# Patient Record
Sex: FEMALE | Race: WHITE | NOT HISPANIC OR LATINO | Employment: OTHER | URBAN - METROPOLITAN AREA
[De-identification: names, ages, dates, MRNs, and addresses within clinical notes are randomized per-mention and may not be internally consistent; named-entity substitution may affect disease eponyms.]

---

## 2017-02-06 RX ORDER — PAROXETINE HYDROCHLORIDE 20 MG/1
TABLET, FILM COATED ORAL
Qty: 90 TAB | Refills: 3 | Status: SHIPPED | OUTPATIENT
Start: 2017-02-06 | End: 2018-06-13 | Stop reason: SDUPTHER

## 2017-03-10 ENCOUNTER — OFFICE VISIT (OUTPATIENT)
Dept: URGENT CARE | Facility: CLINIC | Age: 82
End: 2017-03-10
Payer: MEDICARE

## 2017-03-10 VITALS
HEART RATE: 112 BPM | BODY MASS INDEX: 23.95 KG/M2 | HEIGHT: 60 IN | DIASTOLIC BLOOD PRESSURE: 72 MMHG | SYSTOLIC BLOOD PRESSURE: 140 MMHG | OXYGEN SATURATION: 94 % | WEIGHT: 122 LBS | TEMPERATURE: 97.9 F | RESPIRATION RATE: 20 BRPM

## 2017-03-10 DIAGNOSIS — J44.1 COPD WITH ACUTE EXACERBATION (HCC): ICD-10-CM

## 2017-03-10 DIAGNOSIS — J98.01 ACUTE BRONCHOSPASM: ICD-10-CM

## 2017-03-10 PROCEDURE — 99214 OFFICE O/P EST MOD 30 MIN: CPT | Performed by: EMERGENCY MEDICINE

## 2017-03-10 PROCEDURE — G8420 CALC BMI NORM PARAMETERS: HCPCS | Performed by: EMERGENCY MEDICINE

## 2017-03-10 PROCEDURE — G8432 DEP SCR NOT DOC, RNG: HCPCS | Performed by: EMERGENCY MEDICINE

## 2017-03-10 PROCEDURE — G8484 FLU IMMUNIZE NO ADMIN: HCPCS | Performed by: EMERGENCY MEDICINE

## 2017-03-10 PROCEDURE — 4040F PNEUMOC VAC/ADMIN/RCVD: CPT | Performed by: EMERGENCY MEDICINE

## 2017-03-10 PROCEDURE — 1036F TOBACCO NON-USER: CPT | Performed by: EMERGENCY MEDICINE

## 2017-03-10 PROCEDURE — 1101F PT FALLS ASSESS-DOCD LE1/YR: CPT | Performed by: EMERGENCY MEDICINE

## 2017-03-10 RX ORDER — DOXYCYCLINE HYCLATE 100 MG
100 TABLET ORAL 2 TIMES DAILY
Qty: 14 TAB | Refills: 0 | Status: SHIPPED | OUTPATIENT
Start: 2017-03-10 | End: 2017-06-08

## 2017-03-10 RX ORDER — PREDNISONE 20 MG/1
40 TABLET ORAL DAILY
Qty: 10 TAB | Refills: 0 | Status: SHIPPED | OUTPATIENT
Start: 2017-03-10 | End: 2018-02-13

## 2017-03-10 ASSESSMENT — ENCOUNTER SYMPTOMS
FEVER: 0
WHEEZING: 1
CHILLS: 0
DIARRHEA: 0
SORE THROAT: 1
COUGH: 1
HEADACHES: 0
NAUSEA: 0
ABDOMINAL PAIN: 0
VOMITING: 0
SPUTUM PRODUCTION: 1
PALPITATIONS: 0
HEARTBURN: 0
MYALGIAS: 0
RHINORRHEA: 1
HEMOPTYSIS: 0
SHORTNESS OF BREATH: 0

## 2017-03-10 ASSESSMENT — COPD QUESTIONNAIRES: COPD: 1

## 2017-03-11 NOTE — PATIENT INSTRUCTIONS
Use your Xopenex as needed every 4-6 hours, 1-2 puffs with spacer. Continue your Symbicort.Use an oral probiotic daily, such as Culturelle, Align, or yogurt to reduce gastrointestinal symptoms.  Go to the nearest hospital Emergency Department, or call 911, if any worsening condition.    Bronchospasm, Adult  A bronchospasm is when the tubes that carry air in and out of your lungs (airways) spasm or tighten. During a bronchospasm it is hard to breathe. This is because the airways get smaller. A bronchospasm can be triggered by:  · Allergies. These may be to animals, pollen, food, or mold.  · Infection. This is a common cause of bronchospasm.  · Exercise.  · Irritants. These include pollution, cigarette smoke, strong odors, aerosol sprays, and paint fumes.  · Weather changes.  · Stress.  · Being emotional.  HOME CARE   · Always have a plan for getting help. Know when to call your doctor and local emergency services (911 in the [place] Know where you can get emergency care.  · Only take medicines as told by your doctor.  · If you were prescribed an inhaler or nebulizer machine, ask your doctor how to use it correctly. Always use a spacer with your inhaler if you were given one.  · Stay calm during an attack. Try to relax and breathe more slowly.  · Control your home environment:  ¨ Change your heating and air conditioning filter at least once a month.  ¨ Limit your use of fireplaces and wood stoves.  ¨ Do not  smoke. Do not  allow smoking in your home.  ¨ Avoid perfumes and fragrances.  ¨ Get rid of pests (such as roaches and mice) and their droppings.  ¨ Throw away plants if you see mold on them.  ¨ Keep your house clean and dust free.  ¨ Replace carpet with wood, tile, or vinyl kameron. Carpet can trap dander and dust.  ¨ Use allergy-proof pillows, mattress covers, and box spring covers.  ¨ Wash bed sheets and blankets every week in hot water. Dry them in a dryer.  ¨ Use blankets that are made of polyester or  cotton.  ¨ Wash hands frequently.  GET HELP IF:  · You have muscle aches.  · You have chest pain.  · The thick spit you spit or cough up (sputum) changes from clear or white to yellow, green, gray, or bloody.  · The thick spit you spit or cough up gets thicker.  · There are problems that may be related to the medicine you are given such as:  ¨ A rash.  ¨ Itching.  ¨ Swelling.  ¨ Trouble breathing.  GET HELP RIGHT AWAY IF:  · You feel you cannot breathe or catch your breath.  · You cannot stop coughing.  · Your treatment is not helping you breathe better.  · You have very bad chest pain.  MAKE SURE YOU:   · Understand these instructions.  · Will watch your condition.  · Will get help right away if you are not doing well or get worse.     This information is not intended to replace advice given to you by your health care provider. Make sure you discuss any questions you have with your health care provider.     Document Released: 10/15/2010 Document Revised: 01/08/2016 Document Reviewed: 06/10/2014  [employer] Interactive Patient Education ©2016 [employer]  Chronic Obstructive Pulmonary Disease  Chronic obstructive pulmonary disease (COPD) is a common lung problem. In COPD, the flow of air from the lungs is limited. The way your lungs work will probably never return to normal, but there are things you can do to improve your lungs and make yourself feel better. Your doctor may treat your condition with:  · Medicines.  · Oxygen.  · Lung surgery.  · Changes to your diet.  · Rehabilitation. This may involve a team of specialists.  HOME CARE  · Take all medicines as told by your doctor.  · Avoid medicines or cough syrups that dry up your airway (such as antihistamines) and do not allow you to get rid of thick spit. You do not need to avoid them if told differently by your doctor.  · If you smoke, stop. Smoking makes the problem worse.  · Avoid being around things that make your breathing worse (like smoke, chemicals, and  fumes).  · Use oxygen therapy and therapy to help improve your lungs (pulmonary rehabilitation) if told by your doctor. If you need home oxygen therapy, ask your doctor if you should buy a tool to measure your oxygen level (oximeter).  · Avoid people who have a sickness you can catch (contagious).  · Avoid going outside when it is very hot, cold, or humid.  · Eat healthy foods. Eat smaller meals more often. Rest before meals.  · Stay active, but remember to also rest.  · Make sure to get all the shots (vaccines) your doctor recommends. Ask your doctor if you need a pneumonia shot.  · Learn and use tips on how to relax.  · Learn and use tips on how to control your breathing as told by your doctor. Try:  ¨ Breathing in (inhaling) through your nose for 1 second. Then, pucker your lips and breath out (exhale) through your lips for 2 seconds.  ¨ Putting one hand on your belly (abdomen). Breathe in slowly through your nose for 1 second. Your hand on your belly should move out. Pucker your lips and breathe out slowly through your lips. Your hand on your belly should move in as you breathe out.  · Learn and use controlled coughing to clear thick spit from your lungs. The steps are:  · Lean your head a little forward.  · Breathe in deeply.  · Try to hold your breath for 3 seconds.  · Keep your mouth slightly open while coughing 2 times.  · Spit any thick spit out into a tissue.  · Rest and do the steps again 1 or 2 times as needed.  GET HELP IF:  · You cough up more thick spit than usual.  · There is a change in the color or thickness of the spit.  · It is harder to breathe than usual.  · Your breathing is faster than usual.  GET HELP RIGHT AWAY IF:  · You have shortness of breath while resting.  · You have shortness of breath that stops you from:  · Being able to talk.  · Doing normal activities.  · You chest hurts for longer than 5 minutes.  · Your skin color is more blue than usual.  · Your pulse oximeter shows that you  have low oxygen for longer than 5 minutes.  MAKE SURE YOU:  · Understand these instructions.  · Will watch your condition.  · Will get help right away if you are not doing well or get worse.     This information is not intended to replace advice given to you by your health care provider. Make sure you discuss any questions you have with your health care provider.     Document Released: 06/05/2009 Document Revised: 01/08/2016 Document Reviewed: 08/14/2014  [employer] Interactive Patient Education ©2016 [employer]

## 2017-03-11 NOTE — PROGRESS NOTES
Subjective:      Tania Aguilar is a 82 y.o. female who presents with Cough            Cough  This is a new problem. Episode onset: 1 week. The problem has been gradually worsening. The problem occurs every few minutes. The cough is productive of purulent sputum. Associated symptoms include nasal congestion, postnasal drip, rhinorrhea, a sore throat and wheezing. Pertinent negatives include no chest pain, chills, ear congestion, ear pain, fever, headaches, heartburn, hemoptysis, myalgias, rash or shortness of breath. The symptoms are aggravated by lying down. She has tried a beta-agonist inhaler and steroid inhaler for the symptoms. The treatment provided mild relief. Her past medical history is significant for COPD.       Review of Systems   Constitutional: Negative for fever and chills.   HENT: Positive for congestion, postnasal drip, rhinorrhea and sore throat. Negative for ear pain, hearing loss and nosebleeds.    Respiratory: Positive for cough, sputum production and wheezing. Negative for hemoptysis and shortness of breath.    Cardiovascular: Negative for chest pain, palpitations and leg swelling.   Gastrointestinal: Negative for heartburn, nausea, vomiting, abdominal pain and diarrhea.   Musculoskeletal: Negative for myalgias.   Skin: Negative for rash.   Neurological: Negative for headaches.     PMH:  has a past medical history of [place] (hard of hearing) ( ); Hemorrhoids; Stress incontinence; Shingles; CHI (closed head injury); Allergic rhinitis; Meningitis; ASTHMA; GERD (gastroesophageal reflux disease); Vulvar cancer (CMS-HCC); Osteoporosis; COPD (chronic obstructive pulmonary disease) (CMS-HCC); Hypothyroidism; B12 deficiency; Hypertension; Sinusitis due to Moraxella catarrhalis; HSV-1 infection; Arthritis; Dental disorder; [place] MEDICAL HOME; Atrial fibrillation (CMS-HCC) (January 2015); Depression; Thyroid cancer (CMS-HCC); and Degenerative joint disease. She also has no past medical  history of Breast cancer (CMS-HCC).  MEDS:   Current outpatient prescriptions:   •  predniSONE (DELTASONE) 20 MG Tab, Take 2 Tabs by mouth every day., Disp: 10 Tab, Rfl: 0  •  doxycycline (VIBRAMYCIN) 100 MG Tab, Take 1 Tab by mouth 2 times a day., Disp: 14 Tab, Rfl: 0  •  paroxetine (PAXIL) 20 MG Tab, TAKE ONE TABLET BY MOUTH ONCE DAILY WITH FOOD, Disp: 90 Tab, Rfl: 3  •  omeprazole (PRILOSEC) 20 MG delayed-release capsule, TAKE ONE CAPSULE BY MOUTH ONCE DAILY, Disp: 90 Cap, Rfl: 3  •  liothyronine (CYTOMEL) 25 MCG Tab, Take 2 Tabs by mouth every bedtime., Disp: , Rfl:   •  cetirizine (ZYRTEC) 10 MG Tab, Take 1 Tab by mouth every day., Disp: , Rfl:   •  levothyroxine (SYNTHROID) 200 MCG Tab, Take 1 Tab by mouth Every morning on an empty stomach., Disp: , Rfl:   •  hydrocodone-acetaminophen (LORCET-HD) 5-500 MG per capsule, Take 1-2 Caps by mouth every four hours as needed., Disp: , Rfl:   •  Cyanocobalamin (B-12 PO), Take  by mouth., Disp: , Rfl:   •  tiotropium (SPIRIVA) 18 MCG Cap, Inhale 18 mcg by mouth every day., Disp: , Rfl:   •  acyclovir (ZOVIRAX) 5 % Ointment, Apply 1 Applicator to affected area(s) every 3 hours., Disp: 15 g, Rfl: 2  •  Fexofenadine HCl (MUCINEX ALLERGY PO), Take  by mouth., Disp: , Rfl:   •  budesonide-formoterol (SYMBICORT) 160-4.5 MCG/ACT AERO, Inhale 2 Puffs by mouth 2 Times a Day., Disp: , Rfl:   ALLERGIES:   Allergies   Allergen Reactions   • Ace Inhibitors      Cough     • Albuterol      Whole body spasmed  Patient tolerates Formoterol (Symbicort) - takes at home     SURGHX:   Past Surgical History   Procedure Laterality Date   • Other surgical procedure       ORx3 cancer removal (vulvar Ca.)   • Tonsillectomy     • Thyroidectomy        Due to thyroid cancern   • Other        Removal Rt sinus osteoma   • Hysterectomy robotic  12/27/2013     Performed by Zoltan Salazar M.D. at SURGERY [facility]   • Cystoscopy stent placement  12/27/2013     Performed by Zoltan Salazar M.D. at  SURGERY [facility]   • Gastroscopy with biopsy N/A 5/7/2015     Procedure: GASTROSCOPY WITH BIOPSY;  Surgeon: Travis Jones M.D.;  Location: [facility];  Service:    • Colonoscopy with biopsy N/A 5/7/2015     Procedure: COLONOSCOPY WITH BIOPSY;  Surgeon: Travis Jones M.D.;  Location: [facility];  Service:      SOCHX:  reports that she has been passively smoking.  She has never used smokeless tobacco. She reports that she drinks about 0.6 oz of alcohol per week. She reports that she does not use illicit drugs.  FH: family history includes Cancer in her brother, father, sister, and sister; Heart Disease in her mother and sister; Other in her sister.       Objective:     /72 mmHg  Pulse 112  Temp(Src) 36.6 °C (97.9 °F)  Resp 20  Ht 1.524 m (5')  Wt 55.339 kg (122 lb)  BMI 23.83 kg/m2  SpO2 94%     Physical Exam   Constitutional: She appears well-developed and well-nourished. She is cooperative.  Non-toxic appearance. No distress.   HENT:   Right Ear: Tympanic membrane and ear canal normal.   Left Ear: Tympanic membrane and ear canal normal.   Nose: Mucosal edema and rhinorrhea present. No nasal deformity. Left sinus exhibits frontal sinus tenderness.   Mouth/Throat: Posterior oropharyngeal erythema present. No oropharyngeal exudate or posterior oropharyngeal edema.   Eyes: Conjunctivae are normal.   Neck: Trachea normal and phonation normal. Neck supple. No JVD present.   Cardiovascular: Normal rate, regular rhythm and normal heart sounds.    No murmur heard.  Pulmonary/Chest: Effort normal. No accessory muscle usage. No tachypnea. No respiratory distress. She has decreased breath sounds in the right lower field and the left lower field. She has wheezes in the right upper field and the left upper field. She has no rhonchi. She has no rales.   Abdominal: She exhibits no distension.   Musculoskeletal:   No significant pedal edema.   Lymphadenopathy:     She has  no cervical adenopathy.   Neurological: She is alert.   Skin: Skin is warm and dry.   Psychiatric: She has a normal mood and affect.               Assessment/Plan:     1. COPD with acute exacerbation (CMS-HCC)  - predniSONE (DELTASONE) 20 MG Tab; Take 2 Tabs by mouth every day.  Dispense: 10 Tab; Refill: 0  - doxycycline (VIBRAMYCIN) 100 MG Tab; Take 1 Tab by mouth 2 times a day.  Dispense: 14 Tab; Refill: 0    2. Acute bronchospasm  Advised continue using Symbicort as directed.  Advised to use Xopenex with spacer, especially for the first few days.

## 2017-04-10 RX ORDER — ACYCLOVIR 50 MG/G
OINTMENT TOPICAL
Qty: 15 G | Refills: 5 | Status: SHIPPED | OUTPATIENT
Start: 2017-04-10 | End: 2018-02-13

## 2017-04-10 NOTE — TELEPHONE ENCOUNTER
Was the patient seen in the last year in this department? Yes     Does patient have an active prescription for medications requested? No     Received Request Via: Pharmacy     Last seen: 9/14/2016 Slots

## 2017-05-23 ENCOUNTER — HOSPITAL ENCOUNTER (OUTPATIENT)
Facility: MEDICAL CENTER | Age: 82
End: 2017-05-23
Attending: SPECIALIST
Payer: MEDICARE

## 2017-05-23 PROCEDURE — 88175 CYTOPATH C/V AUTO FLUID REDO: CPT

## 2017-05-24 LAB — CYTOLOGY REG CYTOL: NORMAL

## 2017-06-07 ENCOUNTER — APPOINTMENT (OUTPATIENT)
Dept: PULMONOLOGY | Facility: HOSPICE | Age: 82
End: 2017-06-07
Payer: MEDICARE

## 2017-06-08 ENCOUNTER — APPOINTMENT (OUTPATIENT)
Dept: RADIOLOGY | Facility: IMAGING CENTER | Age: 82
End: 2017-06-08
Attending: NURSE PRACTITIONER
Payer: MEDICARE

## 2017-06-08 ENCOUNTER — OFFICE VISIT (OUTPATIENT)
Dept: URGENT CARE | Facility: CLINIC | Age: 82
End: 2017-06-08
Payer: MEDICARE

## 2017-06-08 VITALS
SYSTOLIC BLOOD PRESSURE: 140 MMHG | HEART RATE: 91 BPM | TEMPERATURE: 98.8 F | WEIGHT: 129 LBS | BODY MASS INDEX: 25.32 KG/M2 | RESPIRATION RATE: 16 BRPM | DIASTOLIC BLOOD PRESSURE: 80 MMHG | OXYGEN SATURATION: 98 % | HEIGHT: 60 IN
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
## 2017-06-08 DIAGNOSIS — S69.90XA INJURY OF LITTLE FINGER: ICD-10-CM

## 2017-06-08 DIAGNOSIS — S80.211A ABRASION OF BOTH KNEES: ICD-10-CM

## 2017-06-08 DIAGNOSIS — S62.619A: ICD-10-CM

## 2017-06-08 DIAGNOSIS — S80.212A ABRASION OF BOTH KNEES: ICD-10-CM

## 2017-06-08 DIAGNOSIS — S00.81XA FACIAL ABRASION, INITIAL ENCOUNTER: ICD-10-CM

## 2017-06-08 PROCEDURE — 73140 X-RAY EXAM OF FINGER(S): CPT | Mod: TC,LT | Performed by: NURSE PRACTITIONER

## 2017-06-08 PROCEDURE — 99214 OFFICE O/P EST MOD 30 MIN: CPT | Performed by: NURSE PRACTITIONER

## 2017-06-08 ASSESSMENT — ENCOUNTER SYMPTOMS
HEADACHES: 0
SENSORY CHANGE: 0
GASTROINTESTINAL NEGATIVE: 1
NEUROLOGICAL NEGATIVE: 1
EYES NEGATIVE: 1
NECK PAIN: 0
NAUSEA: 0
FOCAL WEAKNESS: 0
CONSTITUTIONAL NEGATIVE: 1
DIZZINESS: 0
FALLS: 1
BLURRED VISION: 0
SPEECH CHANGE: 0
VOMITING: 0
PSYCHIATRIC NEGATIVE: 1
LOSS OF CONSCIOUSNESS: 0
ABDOMINAL PAIN: 0
MYALGIAS: 0
BACK PAIN: 0
RESPIRATORY NEGATIVE: 1
TINGLING: 0
SEIZURES: 0
DOUBLE VISION: 0
EYE PAIN: 0
TREMORS: 0
CARDIOVASCULAR NEGATIVE: 1

## 2017-06-08 NOTE — MR AVS SNAPSHOT
Tania Aguilar   2017 5:00 PM   Office Visit   MRN: [id number]    Department:  [facility]   Dept Phone:  [phone]    Description:  Female : 1935   Provider:  MAREN Cannon           Reason for Visit     Finger Pain L hand pinky injury,swollen, fell down and hit face       Allergies as of 2017     Allergen Noted Reactions    Ace Inhibitors 2009       Cough      Albuterol 2013       Whole body spasmed  Patient tolerates Formoterol (Symbicort) - takes at home      You were diagnosed with     Fall, initial encounter   [745416]       Displaced fracture of proximal phalanx of finger of left hand   [5568964]       Facial abrasion, initial encounter   [001346]       Abrasion of both knees   [[id number]]         Vital Signs     Blood Pressure Pulse Temperature Respirations Height Weight    140/80 mmHg 91 37.1 °C (98.8 °F) 16 1.524 m (5') 58.514 kg (129 lb)    Body Mass Index Oxygen Saturation Smoking Status             25.19 kg/m2 98% Passive Smoke Exposure - Never Smoker         Basic Information     Date Of Birth Sex Race Ethnicity Preferred Language    1935 Female White Non- English      Your appointments     2017 11:00 AM   Established Patient Pul with MAREN Silva   [facility] (--)    [address] [place] [address] [address]  [facility] [id number] [phone]              Problem List              ICD-10-CM Priority Class Noted - Resolved    HSV-1 infection B00.9 Low  2009 - Present    Preventative health care Z00.00 Low  2009 - Present    Essential hypertension, benign I10 High  2009 - Present    Hypothyroidism E03.9 Low  2009 - Present    COPD (chronic obstructive pulmonary disease) (CMS-HCC) J44.9 Medium  Unknown - Present    B12 deficiency E53.8 Low  5/3/2011 - Present    Hard of hearing H91.90 Medium  5/3/2011 - Present    Stress incontinence N39.3 Low  Unknown - Present    MDD (major  depressive disorder), recurrent episode, moderate (CMS-HCC) F33.1 Low  Unknown - Present    DJD (degenerative joint disease) M19.90 Low  Unknown - Present    Asthma, mild intermittent, well-controlled J45.20 Medium  Unknown - Present    GERD (gastroesophageal reflux disease) K21.9 Medium  Unknown - Present    History of vulvar dysplasia Z87.412 Low  Unknown - Present    Heart murmur R01.1 Medium  Unknown - Present    Vertigo, benign positional H81.10 Low  6/4/2011 - Present    MEDICAL HOME  Low  2/2/2015 - Present    Atrial fibrillation (CMS-HCC) I48.91 High  5/1/2015 - Present    Anemia D64.9 Medium  5/22/2015 - Present    SEA (obstructive sleep apnea) G47.33   7/7/2016 - Present    Osteoporosis M81.0   9/14/2016 - Present    Seasonal allergies J30.2   9/14/2016 - Present    Prediabetes R73.03   9/14/2016 - Present      Health Maintenance        Date Due Completion Dates    IMM DTaP/Tdap/Td Vaccine (1 - Tdap) 2/9/1955 ---    IMM ZOSTER VACCINE 2/9/1996 ---    IMM PNEUMOCOCCAL 65+ (ADULT) LOW/MEDIUM RISK SERIES (2 of 2 - PCV13) 7/13/2016 7/13/2015, 3/22/2013    BONE DENSITY 9/16/2019 9/16/2014, 7/2/2010    COLONOSCOPY 5/28/2020 5/28/2015, 5/7/2015            Current Immunizations     Influenza TIV (IM) 10/1/2015, 10/4/2013    Influenza Vaccine Pediatric 10/7/2009    Pneumococcal polysaccharide vaccine (PPSV-23) 7/13/2015, 3/22/2013      Below and/or attached are the medications your provider expects you to take. Review all of your home medications and newly ordered medications with your provider and/or pharmacist. Follow medication instructions as directed by your provider and/or pharmacist. Please keep your medication list with you and share with your provider. Update the information when medications are discontinued, doses are changed, or new medications (including over-the-counter products) are added; and carry medication information at all times in the event of emergency situations     Allergies:  ACE INHIBITORS  - (reactions not documented)     ALBUTEROL - (reactions not documented)               Medications  Valid as of: June 08, 2017 -  6:19 PM    Generic Name Brand Name Tablet Size Instructions for use    Acyclovir (Ointment) ZOVIRAX 5 % APPLY TO AFFECTED AREA(S) EVERY 3 HOURS        Budesonide-Formoterol Fumarate (Aerosol) SYMBICORT 160-4.5 MCG/ACT Inhale 2 Puffs by mouth 2 Times a Day.        Cetirizine HCl (Tab) ZYRTEC 10 MG Take 1 Tab by mouth every day.        Cyanocobalamin   Take  by mouth.        Fexofenadine HCl   Take  by mouth.        Levothyroxine Sodium (Tab) SYNTHROID 200 MCG Take 1 Tab by mouth Every morning on an empty stomach.        Liothyronine Sodium (Tab) CYTOMEL 25 MCG Take 2 Tabs by mouth every bedtime.        Omeprazole (CAPSULE DELAYED RELEASE) PRILOSEC 20 MG TAKE ONE CAPSULE BY MOUTH ONCE DAILY        PARoxetine HCl (Tab) PAXIL 20 MG TAKE ONE TABLET BY MOUTH ONCE DAILY WITH FOOD        PredniSONE (Tab) DELTASONE 20 MG Take 2 Tabs by mouth every day.        Tiotropium Bromide Monohydrate (Cap) SPIRIVA 18 MCG Inhale 18 mcg by mouth every day.        .                 Medicines prescribed today were sent to:     [facility] [address] [place] [address], [place] - [address] [facility]    [address] [facility] [address]    Phone: [phone] Fax: [phone]    Open 24 Hours?: No      Medication refill instructions:       If your prescription bottle indicates you have medication refills left, it is not necessary to call your provider’s office. Please contact your pharmacy and they will refill your medication.    If your prescription bottle indicates you do not have any refills left, you may request refills at any time through one of the following ways: The online [employer] system (except Urgent Care), by calling your provider’s office, or by asking your pharmacy to contact your provider’s office with a refill request. Medication refills are processed only during regular business hours and may not be available  until the next business day. Your provider may request additional information or to have a follow-up visit with you prior to refilling your medication.   *Please Note: Medication refills are assigned a new Rx number when refilled electronically. Your pharmacy may indicate that no refills were authorized even though a new prescription for the same medication is available at the pharmacy. Please request the medicine by name with the pharmacy before contacting your provider for a refill.        Your To Do List     Future Labs/Procedures Complete By Expires    DX-FINGER(S) 2+ LEFT  As directed 6/8/2018      Referral     A referral request has been sent to our patient care coordination department. Please allow 3-5 business days for us to process this request and contact you either by phone or mail. If you do not hear from us by the 5th business day, please call us at [phone].        Other Notes About Your Plan     Patient is enrolled in [facility] with Dr. Grewal.           [employer] Access Code: Activation code not generated  Current Pharm[employer] Status: Active

## 2017-06-09 NOTE — PROGRESS NOTES
Subjective:      Tania Aguilar is a 82 y.o. female who presents with Finger Pain          HPI    The patient is here today with complaints of physical injuries resulted from a fall. She reports she accidentally tripped over a curb approx 1.5 hours ago, falling with her hands stretched out, hitting her hands and her face. She hit her left hand during the fall and is now mostly concerned about left pinky pain and swelling. She denies LOC, HA, neck or back pain. Denies headache, nausea, vomiting, changes in vision, unilateral weakness, difficulty with speech or ambulation. Admits to abrasions to her face and knees. Denies previous injuries or surgeries to her left pinky finger. Does admit to a history of arthritis. She is right-hand dominant. She has applied ice to both her face and hand for symptom relief. She does not take blood thinners.    Past Medical History   Diagnosis Date   • [place] (hard of hearing)       Bilaterally   • Hemorrhoids    • Stress incontinence    • Shingles    • CHI (closed head injury)    • Allergic rhinitis    • Meningitis    • ASTHMA    • GERD (gastroesophageal reflux disease)    • Vulvar cancer (CMS-HCC)    • Osteoporosis    • COPD (chronic obstructive pulmonary disease) (CMS-HCC)    • Hypothyroidism    • B12 deficiency    • Hypertension    • Sinusitis due to [place]ax[place] catarrhalis    • HSV-1 infection    • Arthritis      Hands   • Dental disorder      One broken tooth   • Cancer (CMS-[facility])      Vulvar; skin; thyroid   • MEDICAL HOME    • Atrial fibrillation (CMS-[facility]) January 2015     New onset. Echocardiogram with normal LV size, mild concentric LVH, LVEF 65-70%. Normal RA and LA. Mild MR, mild TR, RVSP 35-40mmHg. June 2015: GI bleed, Coumadin stopped/contraindicated.   • Depression    • Thyroid cancer (CMS-[facility])    • Degenerative joint disease      Past Surgical History   Procedure Laterality Date   • Other surgical procedure       ORx3 cancer removal (vulvar Ca.)   • Tonsillectomy     •  Thyroidectomy        Due to thyroid cancern   • Other        Removal Rt sinus osteoma   • Hysterectomy robotic  12/27/2013     Performed by Zoltan Salazar M.D. at SURGERY [facility]   • Cystoscopy stent placement  12/27/2013     Performed by Zoltan Salazar M.D. at SURGERY [facility]   • Gastroscopy with biopsy N/A 5/7/2015     Procedure: GASTROSCOPY WITH BIOPSY;  Surgeon: Travis Jones M.D.;  Location: SURGERY [facility];  Service:    • Colonoscopy with biopsy N/A 5/7/2015     Procedure: COLONOSCOPY WITH BIOPSY;  Surgeon: Travis Jones M.D.;  Location: SURGERY [facility];  Service:      Current Outpatient Prescriptions on File Prior to Visit   Medication Sig Dispense Refill   • acyclovir (ZOVIRAX) 5 % Ointment APPLY TO AFFECTED AREA(S) EVERY 3 HOURS 15 g 5   • predniSONE (DELTASONE) 20 MG Tab Take 2 Tabs by mouth every day. 10 Tab 0   • paroxetine (PAXIL) 20 MG Tab TAKE ONE TABLET BY MOUTH ONCE DAILY WITH FOOD 90 Tab 3   • omeprazole (PRILOSEC) 20 MG delayed-release capsule TAKE ONE CAPSULE BY MOUTH ONCE DAILY 90 Cap 3   • liothyronine (CYTOMEL) 25 MCG Tab Take 2 Tabs by mouth every bedtime.     • levothyroxine (SYNTHROID) 200 MCG Tab Take 1 Tab by mouth Every morning on an empty stomach.     • Cyanocobalamin (B-12 PO) Take  by mouth.     • tiotropium (SPIRIVA) 18 MCG Cap Inhale 18 mcg by mouth every day.     • Fexofenadine HCl (MUCINEX ALLERGY PO) Take  by mouth.     • budesonide-formoterol (SYMBICORT) 160-4.5 MCG/ACT AERO Inhale 2 Puffs by mouth 2 Times a Day.     • cetirizine (ZYRTEC) 10 MG Tab Take 1 Tab by mouth every day.       No current facility-administered medications on file prior to visit.     ALL: Ace inhibitors and Albuterol    Review of Systems   Constitutional: Negative.    HENT: Negative.  Negative for ear discharge, ear pain, hearing loss and nosebleeds.    Eyes: Negative.  Negative for blurred vision, double vision and pain.   Respiratory: Negative.    Cardiovascular:  Negative.    Gastrointestinal: Negative.  Negative for nausea, vomiting and abdominal pain.   Genitourinary: Negative.    Musculoskeletal: Positive for joint pain and falls. Negative for myalgias, back pain and neck pain.   Skin: Negative.    Neurological: Negative.  Negative for dizziness, tingling, tremors, sensory change, speech change, focal weakness, seizures, loss of consciousness and headaches.   Endo/Heme/Allergies: Negative.    Psychiatric/Behavioral: Negative.           Objective:     /80 mmHg  Pulse 91  Temp(Src) 37.1 °C (98.8 °F)  Resp 16  Ht 1.524 m (5')  Wt 58.514 kg (129 lb)  BMI 25.19 kg/m2  SpO2 98%      Physical Exam   Constitutional: She is oriented to person, place, and time. Vital signs are normal. She appears well-developed and well-nourished. She does not appear ill. No distress.   HENT:   Head: Normocephalic. Head is with abrasion. Head is without raccoon's eyes, without Macias's sign, without laceration, without right periorbital erythema and without left periorbital erythema.       Right Ear: Hearing, tympanic membrane, external ear and ear canal normal. No lacerations. No drainage. Tympanic membrane is not perforated.   Left Ear: Hearing, tympanic membrane, external ear and ear canal normal. No lacerations. No drainage. Tympanic membrane is not perforated.   Nose: Nose normal.   Mouth/Throat: Oropharynx is clear and moist. No oropharyngeal exudate.   Eyes: Conjunctivae, EOM and lids are normal. Pupils are equal, round, and reactive to light. Right eye exhibits no discharge. Left eye exhibits no discharge. No scleral icterus.   Neck: Normal range of motion and full passive range of motion without pain. Neck supple. No spinous process tenderness and no muscular tenderness present. No tracheal deviation and normal range of motion present.   Cardiovascular: Normal rate, regular rhythm, normal heart sounds and intact distal pulses.    Pulmonary/Chest: Effort normal and breath  "sounds normal. No stridor. No respiratory distress.   Musculoskeletal: She exhibits tenderness. She exhibits no edema.        Left elbow: Normal.        Left wrist: Normal.        Cervical back: Normal.        Thoracic back: Normal.        Lumbar back: Normal.        Left forearm: Normal.        Left hand: She exhibits decreased range of motion, tenderness, bony tenderness and swelling. She exhibits normal capillary refill, no deformity and no laceration. Normal sensation noted. Normal strength noted.        Hands:       Legs:  Lymphadenopathy:     She has no cervical adenopathy.   Neurological: She is alert and oriented to person, place, and time. She has normal strength. She is not disoriented. She displays no tremor. No cranial nerve deficit or sensory deficit. She exhibits normal muscle tone. She displays no seizure activity. Coordination and gait normal. GCS eye subscore is 4. GCS verbal subscore is 5. GCS motor subscore is 6.   Skin: Skin is warm. Abrasion and bruising noted. No ecchymosis, no laceration and no rash noted. She is not diaphoretic. No erythema. No pallor.   Psychiatric: She has a normal mood and affect. Her behavior is normal. Judgment and thought content normal.   Vitals reviewed.              Assessment/Plan:     1. Fall, initial encounter     2. Displaced fracture of proximal phalanx of finger of left hand  DX-FINGER(S) 2+ LEFT    REFERRAL TO ORTHOPEDICS   3. Facial abrasion, initial encounter     4. Abrasion of both knees           - DX-FINGER(S) 2+ LEFT reviewed by myself, radiology reading \"Minimally displaced, angulated fracture through the base of the fifth proximal phalanx.\"    Patient's neurovascular status is intact with minimal amount of pain, therefore patient placed in an ulnar gutter splint with follow-up with PATRICIA first thing tomorrow morning for reevaluation and treatment. N/V intact upon departure. Referral placed to ortho, will follow up with PATRICIA tomorrow.   Supportive care, " differential diagnoses, and indications for immediate follow-up discussed with patient.   Pathogenesis of diagnosis discussed including typical length and natural progression.   Instructed to return to clinic or nearest emergency department for any change in condition, further concerns, or worsening of symptoms.  Patient states understanding of the plan of care and discharge instructions.          MARLEN Cannon.

## 2017-07-19 ENCOUNTER — OFFICE VISIT (OUTPATIENT)
Dept: PULMONOLOGY | Facility: HOSPICE | Age: 82
End: 2017-07-19
Payer: MEDICARE

## 2017-07-19 VITALS
SYSTOLIC BLOOD PRESSURE: 130 MMHG | WEIGHT: 124 LBS | HEIGHT: 60 IN | HEART RATE: 94 BPM | RESPIRATION RATE: 16 BRPM | DIASTOLIC BLOOD PRESSURE: 72 MMHG | BODY MASS INDEX: 24.35 KG/M2 | OXYGEN SATURATION: 98 % | TEMPERATURE: 98 F

## 2017-07-19 DIAGNOSIS — G47.33 OSA (OBSTRUCTIVE SLEEP APNEA): ICD-10-CM

## 2017-07-19 DIAGNOSIS — J45.20 MILD INTERMITTENT ASTHMA WITHOUT COMPLICATION: ICD-10-CM

## 2017-07-19 PROCEDURE — 99214 OFFICE O/P EST MOD 30 MIN: CPT | Performed by: NURSE PRACTITIONER

## 2017-07-19 RX ORDER — SYRINGE AND NEEDLE,INSULIN,1ML 25GX1"
SYRINGE, EMPTY DISPOSABLE MISCELLANEOUS
Refills: 2 | COMMUNITY
Start: 2017-05-17 | End: 2019-01-27

## 2017-07-19 RX ORDER — LEVALBUTEROL TARTRATE 45 UG/1
1-2 AEROSOL, METERED ORAL EVERY 4 HOURS PRN
COMMUNITY
End: 2019-01-27

## 2017-07-19 RX ORDER — CYANOCOBALAMIN 1000 UG/ML
INJECTION, SOLUTION INTRAMUSCULAR; SUBCUTANEOUS
Refills: 3 | COMMUNITY
Start: 2017-05-22 | End: 2019-01-27

## 2017-07-19 NOTE — MR AVS SNAPSHOT
Tania Aguilar   2017 11:00 AM   Office Visit   MRN: [id number]    Department:  [facility]   Dept Phone:  [phone]    Description:  Female : 1935   Provider:  MAREN Silva           Reason for Visit     COPD Last seen 16      Allergies as of 2017     Allergen Noted Reactions    Ace Inhibitors 2009       Cough      Albuterol 2013       Whole body spasmed  Patient tolerates Formoterol (Symbicort) - takes at home      You were diagnosed with     Mild intermittent asthma without complication   [871483]       SEA (obstructive sleep apnea)   [200886]         Vital Signs     Blood Pressure Pulse Temperature Respirations Height Weight    130/72 mmHg 94 36.7 °C (98 °F) 16 1.524 m (5') 56.246 kg (124 lb)    Body Mass Index Oxygen Saturation Smoking Status             24.22 kg/m2 98% Passive Smoke Exposure - Never Smoker         Basic Information     Date Of Birth Sex Race Ethnicity Preferred Language    1935 Female White Non- English      Problem List              ICD-10-CM Priority Class Noted - Resolved    HSV-1 infection B00.9 Low  2009 - Present    Preventative health care Z00.00 Low  2009 - Present    Essential hypertension, benign I10 High  2009 - Present    Hypothyroidism E03.9 Low  2009 - Present    COPD (chronic obstructive pulmonary disease) (CMS-HCC) J44.9 Medium  Unknown - Present    B12 deficiency E53.8 Low  5/3/2011 - Present    Hard of hearing H91.90 Medium  5/3/2011 - Present    Stress incontinence N39.3 Low  Unknown - Present    MDD (major depressive disorder), recurrent episode, moderate (CMS-HCC) F33.1 Low  Unknown - Present    DJD (degenerative joint disease) M19.90 Low  Unknown - Present    Asthma, mild intermittent, well-controlled J45.20 Medium  Unknown - Present    GERD (gastroesophageal reflux disease) K21.9 Medium  Unknown - Present    History of vulvar dysplasia Z87.412 Low  Unknown - Present    Heart murmur R01.1 Medium  Unknown - Present    Vertigo, benign positional H81.10 Low  6/4/2011 - Present    MEDICAL HOME  Low  2/2/2015 - Present    Atrial fibrillation (CMS-[facility]) I48.91 High  5/1/2015 - Present    Anemia D64.9 Medium  5/22/2015 - Present    SAE (obstructive sleep apnea) G47.33   7/7/2016 - Present    Osteoporosis M81.0   9/14/2016 - Present    Seasonal allergies J30.2   9/14/2016 - Present    Prediabetes R73.03   9/14/2016 - Present    Mild intermittent asthma without complication J45.20   7/19/2017 - Present      Health Maintenance        Date Due Completion Dates    IMM DTaP/Tdap/Td Vaccine (1 - Tdap) 2/9/1955 ---    IMM ZOSTER VACCINE 2/9/1996 ---    IMM PNEUMOCOCCAL 65+ (ADULT) LOW/MEDIUM RISK SERIES (2 of 2 - PCV13) 7/13/2016 7/13/2015, 3/22/2013    IMM INFLUENZA (1) 9/1/2017 10/1/2015, 10/4/2013, 10/7/2009    BONE DENSITY 9/16/2019 9/16/2014, 7/2/2010    COLONOSCOPY 5/28/2020 5/28/2015, 5/7/2015            Current Immunizations     Influenza TIV (IM) 10/1/2015, 10/4/2013    Influenza Vaccine Pediatric 10/7/2009    Pneumococcal polysaccharide vaccine (PPSV-23) 7/13/2015, 3/22/2013      Below and/or attached are the medications your provider expects you to take. Review all of your home medications and newly ordered medications with your provider and/or pharmacist. Follow medication instructions as directed by your provider and/or pharmacist. Please keep your medication list with you and share with your provider. Update the information when medications are discontinued, doses are changed, or new medications (including over-the-counter products) are added; and carry medication information at all times in the event of emergency situations     Allergies:  ACE INHIBITORS - (reactions not documented)     ALBUTEROL - (reactions not documented)               Medications  Valid as of: July 19, 2017 - 12:21 PM    Generic Name Brand Name Tablet Size Instructions for use    Acyclovir (Ointment) ZOVIRAX 5 %  "APPLY TO AFFECTED AREA(S) EVERY 3 HOURS        Budesonide-Formoterol Fumarate (Aerosol) SYMBICORT 160-4.5 MCG/ACT Inhale 2 Puffs by mouth 2 Times a Day.        Cetirizine HCl (Tab) ZYRTEC 10 MG Take 1 Tab by mouth every day.        Cyanocobalamin   Take  by mouth.        Cyanocobalamin (Solution) VITAMIN B-12 1000 MCG/ML INJECT 1ML SUBCUTANEOUSLY ONCE WEEKLY        Fexofenadine HCl   Take  by mouth.        Insulin Syringe-Needle U-100 (Misc) B-D INSULIN SYRINGE 1CC/25GX1\" 25G X 1\" 1 ML USE AS DIRECTED        Levalbuterol Tartrate (Aerosol) XOPENEX HFA 45 MCG/ACT Inhale 1-2 Puffs by mouth every four hours as needed for Shortness of Breath.        Levothyroxine Sodium (Tab) SYNTHROID 200 MCG Take 1 Tab by mouth Every morning on an empty stomach.        Liothyronine Sodium (Tab) CYTOMEL 25 MCG Take 2 Tabs by mouth every bedtime.        Omeprazole (CAPSULE DELAYED RELEASE) PRILOSEC 20 MG TAKE ONE CAPSULE BY MOUTH ONCE DAILY        PARoxetine HCl (Tab) PAXIL 20 MG TAKE ONE TABLET BY MOUTH ONCE DAILY WITH FOOD        PredniSONE (Tab) DELTASONE 20 MG Take 2 Tabs by mouth every day.        Tiotropium Bromide Monohydrate (Cap) SPIRIVA 18 MCG Inhale 18 mcg by mouth every day.        .                 Medicines prescribed today were sent to:     [facility] [address] [place] [address], [place] - [address] [place] [address]    [address] [facility] [address]    Phone: [phone] Fax: [phone]    Open 24 Hours?: No      Medication refill instructions:       If your prescription bottle indicates you have medication refills left, it is not necessary to call your provider’s office. Please contact your pharmacy and they will refill your medication.    If your prescription bottle indicates you do not have any refills left, you may request refills at any time through one of the following ways: The online [employer] system (except Urgent Care), by calling your provider’s office, or by asking your pharmacy to contact your provider’s office with a " refill request. Medication refills are processed only during regular business hours and may not be available until the next business day. Your provider may request additional information or to have a follow-up visit with you prior to refilling your medication.   *Please Note: Medication refills are assigned a new Rx number when refilled electronically. Your pharmacy may indicate that no refills were authorized even though a new prescription for the same medication is available at the pharmacy. Please request the medicine by name with the pharmacy before contacting your provider for a refill.        Instructions    1) Continue CPAP at [id number]  2) Clean mask and supplies weekly and change them as insurance allows  3) Continue Spiriva and use Symbicort with exacerbation    4) Vaccines: Up to date with Pneumovax 23 in 2013.  Prevnar 13 at next OV.   5) Return in about 6 months (around 1/19/2018) for Compliance, review of symptoms, if not sooner, follow up with PHIL Martinez.           Other Notes About Your Plan     Patient is enrolled in [facility] with Dr. Grewal.           [employer] Access Code: Activation code not generated  Current [employer] Status: Active

## 2017-07-19 NOTE — PROGRESS NOTES
CC:  Here for f/u sleep and pulmonary issues as listed below    HPI:   Tania, who likes to be called Rossana,  presents today for follow up obstructive sleep apnea and asthma.  PSG from 6/2016 indicated an AHI of 67.1 and low oxygen of 84%.  Currently she is being treated with CPAP @ [id number].  Compliance download from the dates 6/19/2017 - 7/18/2017 indicates she is wearing the device 100% for an avg of 7 hours and 46 minutes per night with a reduced AHI of 4.8.  She does tolerate pressure and mask well.  She wakes up refreshed now and denies morning H/A.  she continues to clean supplies weekly and change them as insurance allows. Overall, she feels the best she has in over 2 years. She c/o that she sleeps longer than she should. She receives B12 shots weekly. She has not had thyroid rechecked since last year without f/u. Encouraged to f/u with primary    She also has a history of Asthma. She uses Spiriva daily and finds it most beneficial.  Used to take Symbicort, but often forgot to use it. She stopped Singulair. She rarely requires her Xopenex rescue inhaler. She denies any asthma exacerbations over the past 6 months. Spirometry from 2013 is normal with FEV1 1.58 L or 99%, fev1/FVC ratio of 76. She denies dyspnea, cough, wheezing or chest tightness. Her shortness of breath has been stable with overt exertion.  She has a history of Aspergillus fumigate diagnosed per bronchoscopic cultures, with negative chest CT scan for infiltrates or nodules. Her last chest x-ray 05/15 showed no acute cardiopulmonary process.    She will be traveling to [place] to see her sisters tomorrow.       Patient Active Problem List    Diagnosis Date Noted   • Atrial fibrillation (CMS-[facility]) 05/01/2015     Priority: High   • Essential hypertension, benign 08/19/2009     Priority: High   • Anemia 05/22/2015     Priority: Medium   • Hard of hearing 05/03/2011     Priority: Medium   • Asthma, mild intermittent, well-controlled      Priority: Medium    • GERD (gastroesophageal reflux disease)      Priority: Medium   • Heart murmur      Priority: Medium   • COPD (chronic obstructive pulmonary disease) (CMS-HCC)      Priority: Medium   • MEDICAL HOME 02/02/2015     Priority: Low   • Vertigo, benign positional 06/04/2011     Priority: Low   • B12 deficiency 05/03/2011     Priority: Low   • Stress incontinence      Priority: Low   • MDD (major depressive disorder), recurrent episode, moderate (CMS-HCC)      Priority: Low   • DJD (degenerative joint disease)      Priority: Low   • History of vulvar dysplasia      Priority: Low   • Hypothyroidism 08/19/2009     Priority: Low   • 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     Priority: Low   • Preventative health care 07/22/2009     Priority: Low   • Mild intermittent asthma without complication 07/19/2017   • Osteoporosis 09/14/2016   • Seasonal allergies 09/14/2016   • Prediabetes 09/14/2016   • SEA (obstructive sleep apnea) 07/07/2016       Past Medical History   Diagnosis Date   • [place] De[place] (hard of hearing)       Bilaterally   • Hemorrhoids    • Stress incontinence    • Shingles    • CHI (closed head injury)    • Allergic rhinitis    • Meningitis    • ASTHMA    • GERD (gastroesophageal reflux disease)    • Vulvar cancer (CMS-HCC)    • Osteoporosis    • COPD (chronic obstructive pulmonary disease) (CMS-HCC)    • Hypothyroidism    • B12 deficiency    • Hypertension    • Sinusitis due to Moraxella catarrhalis    • HSV-1 infection    • Arthritis      Hands   • Dental disorder      One broken tooth   • Cancer (CMS-HCC)      Vulvar; skin; thyroid   • MEDICAL HOME    • Atrial fibrillation (CMS-HCC) January 2015     New onset. Echocardiogram with normal LV size, mild concentric LVH, LVEF 65-70%. Normal RA and LA. Mild MR, mild TR, RVSP 35-40mmHg. June 2015: GI bleed, Coumadin stopped/contraindicated.   • Depression    • Thyroid cancer (CMS-HCC)    • Degenerative joint disease        Past Surgical History   Procedure Laterality Date   • Other  "surgical procedure       ORx3 cancer removal (vulvar Ca.)   • Tonsillectomy     • Thyroidectomy        Due to thyroid cancern   • Other        Removal Rt sinus osteoma   • Hysterectomy robotic  12/27/2013     Performed by Zoltan Salazar M.D. at SURGERY [facility]   • Cystoscopy stent placement  12/27/2013     Performed by Zotlan Salazar M.D. at [facility]   • Gastroscopy with biopsy N/A 5/7/2015     Procedure: GASTROSCOPY WITH BIOPSY;  Surgeon: Travis Jones M.D.;  Location: SURGERY [facility];  Service:    • Colonoscopy with biopsy N/A 5/7/2015     Procedure: COLONOSCOPY WITH BIOPSY;  Surgeon: Travis Jones M.D.;  Location: [facility];  Service:        Family History   Problem Relation Age of Onset   • Heart Disease Mother      [email or web address]   • Cancer Father      throat Cancer   • Heart Disease Sister    • Cancer Brother      brain tumor   • Cancer Sister      breast   • Cancer Sister      lung   • Other Sister      myeloma       Social History     Social History   • Marital Status:      Spouse Name: N/A   • Number of Children: N/A   • Years of Education: N/A     Occupational History   • Not on file.     Social History Main Topics   • Smoking status: Passive Smoke Exposure - Never Smoker   • Smokeless tobacco: Never Used   • Alcohol Use: 0.6 oz/week     1 Standard drinks or equivalent per week      Comment: rarely   • Drug Use: No   • Sexual Activity: Not on file     Other Topics Concern   • Not on file     Social History Narrative       Current Outpatient Prescriptions   Medication Sig Dispense Refill   • cyanocobalamin (VITAMIN B-12) 1000 MCG/ML Solution INJECT 1ML SUBCUTANEOUSLY ONCE WEEKLY  3   • B-D INSULIN SYRINGE 1CC/25GX1\" 25G X 1\" 1 ML Misc USE AS DIRECTED  2   • levalbuterol (XOPENEX HFA) 45 MCG/ACT inhaler Inhale 1-2 Puffs by mouth every four hours as needed for Shortness of Breath.     • acyclovir (ZOVIRAX) 5 % Ointment APPLY TO AFFECTED AREA(S) EVERY 3 " HOURS 15 g 5   • predniSONE (DELTASONE) 20 MG Tab Take 2 Tabs by mouth every day. 10 Tab 0   • paroxetine (PAXIL) 20 MG Tab TAKE ONE TABLET BY MOUTH ONCE DAILY WITH FOOD 90 Tab 3   • omeprazole (PRILOSEC) 20 MG delayed-release capsule TAKE ONE CAPSULE BY MOUTH ONCE DAILY 90 Cap 3   • liothyronine (CYTOMEL) 25 MCG Tab Take 2 Tabs by mouth every bedtime.     • levothyroxine (SYNTHROID) 200 MCG Tab Take 1 Tab by mouth Every morning on an empty stomach.     • tiotropium (SPIRIVA) 18 MCG Cap Inhale 18 mcg by mouth every day.     • Fexofenadine HCl (MUCINEX ALLERGY PO) Take  by mouth.     • budesonide-formoterol (SYMBICORT) 160-4.5 MCG/ACT AERO Inhale 2 Puffs by mouth 2 Times a Day.     • cetirizine (ZYRTEC) 10 MG Tab Take 1 Tab by mouth every day.     • Cyanocobalamin (B-12 PO) Take  by mouth.       No current facility-administered medications for this visit.          Allergies: Ace inhibitors and Albuterol      ROS   Gen: Denies fever, chills, unintentional weight loss, fatigue, night sweats  E/N/T: Denies ear pain, nasal congestion  Resp:Denies Dyspnea, wheezing, cough, sputum production, hemoptysis  CV: Denies chest pain, chest tightness, palpitations, BLE edema  Sleep:Denies morning headache, insomnia, daytime somnolence, snoring, gasping for air, apnea  Neuro: Denies frequent headaches, weakness, dizziness  GI: Denies N/V, acid reflux/heartburn  See HPI.  All other systems reviewed and negative      Vital signs for this encounter:  Filed Vitals:    07/19/17 1100   Height: 1.524 m (5')   Weight: 56.246 kg (124 lb)   Weight % change since last entry.: 0 %   BP: 130/72   Pulse: 94   BMI (Calculated): 24.22   Resp: 16   Temp: 36.7 °C (98 °F)   O2 sat % room air: 98 %                 Physical Exam:   Appearance: well developed, well nourished, no acute distress.  Eyes: PERRL, EOM intact, sclere white, conjunctiva moist.  Ears: no lesions or deformities.  Hearing: grossly intact.  Nose: no lesions or  deformities.  Dentition: good dentition.  Oropharynx: tongue normal, posterior pharynx without erythema or exudate.  Neck: supple, trachea midline, no masses.  Respiratory effort: no intercostal retractions or use of accessory muscles.  Lung auscultation: Bilateral diminished   Heart auscultation: no murmur, rub, or gallop.   Extremities: no cyanosis or edema.  Abdomen: soft, non-tender, no masses.  Gait and station: grossly normal   Digits and Nails: no clubbing, cyanosis, petechiae, or nodes.  Cranial nerves: grossly normal.  Motor: no focal deficits observed.  Skin: no rashes, lesions, or ulcers noted.  Orientation: oriented to time, place, and person.  Mood and affect: mood and affect appropriate, normal interaction with examiner.    Assessment   1. Mild intermittent asthma without complication  CANCELED: PNEUMOCOCCAL CONJUGATE VACCINE 13-VALENT   2. SEA (obstructive sleep apnea)  CANCELED: PNEUMOCOCCAL CONJUGATE VACCINE 13-VALENT       PLAN:   Patient Instructions   1) Continue CPAP at [id number]  2) Clean mask and supplies weekly and change them as insurance allows  3) Continue Spiriva and use Symbicort with exacerbation    4) Vaccines: Up to date with Pneumovax 23 in 2013.  Prevnar 13 at next OV.   5) Return in about 6 months (around 1/19/2018) for Compliance, review of symptoms, if not sooner, follow up with PHIL Martinez.

## 2017-07-19 NOTE — PATIENT INSTRUCTIONS
1) Continue CPAP at [id number]  2) Clean mask and supplies weekly and change them as insurance allows  3) Continue Spiriva and use Symbicort with exacerbation    4) Vaccines: Up to date with Pneumovax 23 in 2013.  Prevnar 13 at next OV.   5) Return in about 6 months (around 1/19/2018) for Compliance, review of symptoms, if not sooner, follow up with PHIL Martinez.

## 2018-01-17 ENCOUNTER — OFFICE VISIT (OUTPATIENT)
Dept: URGENT CARE | Facility: CLINIC | Age: 83
End: 2018-01-17
Payer: MEDICARE

## 2018-01-17 ENCOUNTER — PATIENT OUTREACH (OUTPATIENT)
Dept: HEALTH INFORMATION MANAGEMENT | Facility: OTHER | Age: 83
End: 2018-01-17

## 2018-01-17 ENCOUNTER — HOSPITAL ENCOUNTER (OUTPATIENT)
Facility: MEDICAL CENTER | Age: 83
End: 2018-01-17
Attending: PHYSICIAN ASSISTANT
Payer: MEDICARE

## 2018-01-17 ENCOUNTER — APPOINTMENT (OUTPATIENT)
Dept: RADIOLOGY | Facility: IMAGING CENTER | Age: 83
End: 2018-01-17
Attending: PHYSICIAN ASSISTANT
Payer: MEDICARE

## 2018-01-17 VITALS
OXYGEN SATURATION: 94 % | WEIGHT: 123 LBS | DIASTOLIC BLOOD PRESSURE: 80 MMHG | BODY MASS INDEX: 25.82 KG/M2 | SYSTOLIC BLOOD PRESSURE: 120 MMHG | TEMPERATURE: 97.8 F | HEART RATE: 72 BPM | HEIGHT: 58 IN | RESPIRATION RATE: 16 BRPM

## 2018-01-17 DIAGNOSIS — K29.00 ACUTE GASTRITIS WITHOUT HEMORRHAGE, UNSPECIFIED GASTRITIS TYPE: ICD-10-CM

## 2018-01-17 DIAGNOSIS — R10.84 GENERALIZED ABDOMINAL PAIN: ICD-10-CM

## 2018-01-17 LAB
APPEARANCE UR: CLEAR
BILIRUB UR STRIP-MCNC: NORMAL MG/DL
COLOR UR AUTO: YELLOW
GLUCOSE UR STRIP.AUTO-MCNC: NORMAL MG/DL
KETONES UR STRIP.AUTO-MCNC: NORMAL MG/DL
LEUKOCYTE ESTERASE UR QL STRIP.AUTO: NORMAL
NITRITE UR QL STRIP.AUTO: NORMAL
PH UR STRIP.AUTO: 6 [PH] (ref 5–8)
PROT UR QL STRIP: NORMAL MG/DL
RBC UR QL AUTO: NORMAL
SP GR UR STRIP.AUTO: 1
UROBILINOGEN UR STRIP-MCNC: NORMAL MG/DL

## 2018-01-17 PROCEDURE — 81002 URINALYSIS NONAUTO W/O SCOPE: CPT | Performed by: PHYSICIAN ASSISTANT

## 2018-01-17 PROCEDURE — 99214 OFFICE O/P EST MOD 30 MIN: CPT | Performed by: PHYSICIAN ASSISTANT

## 2018-01-17 PROCEDURE — 87086 URINE CULTURE/COLONY COUNT: CPT

## 2018-01-17 PROCEDURE — 74019 RADEX ABDOMEN 2 VIEWS: CPT | Mod: 26 | Performed by: PHYSICIAN ASSISTANT

## 2018-01-17 ASSESSMENT — ENCOUNTER SYMPTOMS
WEAKNESS: 0
NAUSEA: 0
BELCHING: 1
COUGH: 0
DIZZINESS: 0
WEIGHT LOSS: 0
CHILLS: 0
ABDOMINAL PAIN: 1
VOMITING: 0
CONSTIPATION: 0
HEARTBURN: 0
DIAPHORESIS: 0
DIARRHEA: 0
PALPITATIONS: 0
FEVER: 0
BLOOD IN STOOL: 0
SHORTNESS OF BREATH: 0

## 2018-01-17 NOTE — PROGRESS NOTES
1. Attempt #: 1    2. HealthConnect Verified: no    3. Verify PCP: yes    4. Care Team Updated:       •   DME Company (gait device, O2, CPAP, etc.): YES       •   Other Specialists (eye doctor, derm, GYN, cardiology, endo, etc): YES    5.  Reviewed/Updated the following with patient:       •   Communication Preference Obtained? YES       •   Preferred Pharmacy? YES       •   Preferred Lab? YES       •   Family History (document living status of immediate family members and if + hx of cancer, diabetes, hypertension, hyperlipidemia, heart attack, stroke) YES. Was Abstract Encounter opened and chart updated? YES    6. Transactis Activation: already active    7. Transactis Darline: no    8. Annual Wellness Visit Scheduling  Scheduling Status:Scheduled      9. Care Gap Scheduling (Attempt to Schedule EACH Overdue Care Gap!)     Health Maintenance Due   Topic Date Due   • IMM DTaP/Tdap/Td Vaccine (1 - Tdap) 02/09/1954   • IMM ZOSTER VACCINE  02/09/1996   • IMM PNEUMOCOCCAL 65+ (ADULT) LOW/MEDIUM RISK SERIES (2 of 2 - PCV13) 07/13/2016   • PFT SCREENING-FEV1 AND FEV/FVC RATIO / SPIROMETRY SHOULD BE PERFORMED ANNUALLY  05/17/2017   • IMM INFLUENZA (1) 09/01/2017   • Annual Wellness Visit  09/15/2017        Scheduled patient for Annual Wellness Visit      10. Patient was advised: “This is a free wellness visit. The provider will screen for medical conditions to help you stay healthy. If you have other concerns to address you may be asked to discuss these at a separate visit or there may be an additional fee.”     11. Patient was informed to arrive 15 min prior to their scheduled appointment and bring in their medication bottles.

## 2018-01-18 DIAGNOSIS — R10.84 GENERALIZED ABDOMINAL PAIN: ICD-10-CM

## 2018-01-18 NOTE — PROGRESS NOTES
Subjective:      Tania Aguilar is a 82 y.o. female who presents with Abdominal Pain (Since yesterday abdominal pain , bloated and burping a lot.)            Abdominal Pain   This is a new problem. Episode onset: 6 days ago. The onset quality is gradual. The problem occurs intermittently. The problem has been waxing and waning. The pain is located in the generalized abdominal region. The pain is moderate. The quality of the pain is dull and sharp. Associated symptoms include belching. Pertinent negatives include no constipation, diarrhea, fever, melena, nausea, vomiting or weight loss. Nothing aggravates the pain. The pain is relieved by nothing. She has tried nothing for the symptoms. bowel blockage       Review of Systems   Constitutional: Negative for chills, diaphoresis, fever, malaise/fatigue and weight loss.   Respiratory: Negative for cough and shortness of breath.    Cardiovascular: Negative for chest pain and palpitations.   Gastrointestinal: Positive for abdominal pain. Negative for blood in stool, constipation, diarrhea, heartburn, melena, nausea and vomiting.   Neurological: Negative for dizziness and weakness.   All other systems reviewed and are negative.    PMH:  has a past medical history of Allergic rhinitis; Arthritis; ASTHMA; Atrial fibrillation (CMS-HCC) (January 2015); B12 deficiency; Cancer (CMS-HCC); CHI (closed head injury); COPD (chronic obstructive pulmonary disease) (CMS-HCC); Degenerative joint disease; Dental disorder; Depression; GERD (gastroesophageal reflux disease); Hemorrhoids; [place] (hard of hearing) ( ); HSV-1 infection; Hypertension; Hypothyroidism; MEDICAL HOME; Meningitis; Osteoporosis; Shingles; Sinusitis due to Moraxella catarrhalis; Stress incontinence; Thyroid cancer (CMS-HCC); and Vulvar cancer (CMS-HCC). She also has no past medical history of Breast cancer (CMS-HCC).  MEDS:   Current Outpatient Prescriptions:   •  liothyronine (CYTOMEL) 25 MCG Tab, Take 2 Tabs by  "mouth every bedtime., Disp: , Rfl:   •  levothyroxine (SYNTHROID) 200 MCG Tab, Take 1 Tab by mouth Every morning on an empty stomach., Disp: , Rfl:   •  cyanocobalamin (VITAMIN B-12) 1000 MCG/ML Solution, INJECT 1ML SUBCUTANEOUSLY ONCE WEEKLY, Disp: , Rfl: 3  •  B-D INSULIN SYRINGE 1CC/25GX1\" 25G X 1\" 1 ML Misc, USE AS DIRECTED, Disp: , Rfl: 2  •  levalbuterol (XOPENEX HFA) 45 MCG/ACT inhaler, Inhale 1-2 Puffs by mouth every four hours as needed for Shortness of Breath., Disp: , Rfl:   •  acyclovir (ZOVIRAX) 5 % Ointment, APPLY TO AFFECTED AREA(S) EVERY 3 HOURS, Disp: 15 g, Rfl: 5  •  predniSONE (DELTASONE) 20 MG Tab, Take 2 Tabs by mouth every day., Disp: 10 Tab, Rfl: 0  •  paroxetine (PAXIL) 20 MG Tab, TAKE ONE TABLET BY MOUTH ONCE DAILY WITH FOOD, Disp: 90 Tab, Rfl: 3  •  omeprazole (PRILOSEC) 20 MG delayed-release capsule, TAKE ONE CAPSULE BY MOUTH ONCE DAILY, Disp: 90 Cap, Rfl: 3  •  cetirizine (ZYRTEC) 10 MG Tab, Take 1 Tab by mouth every day., Disp: , Rfl:   •  Cyanocobalamin (B-12 PO), Take  by mouth., Disp: , Rfl:   •  tiotropium (SPIRIVA) 18 MCG Cap, Inhale 18 mcg by mouth every day., Disp: , Rfl:   •  Fexofenadine HCl (MUCINEX ALLERGY PO), Take  by mouth., Disp: , Rfl:   •  budesonide-formoterol (SYMBICORT) 160-4.5 MCG/ACT AERO, Inhale 2 Puffs by mouth 2 Times a Day., Disp: , Rfl:   ALLERGIES:   Allergies   Allergen Reactions   • Ace Inhibitors      Cough     • Albuterol      Whole body spasmed  Patient tolerates Formoterol (Symbicort) - takes at home     SURGHX:   Past Surgical History:   Procedure Laterality Date   • GASTROSCOPY WITH BIOPSY N/A 5/7/2015    Procedure: GASTROSCOPY WITH BIOPSY;  Surgeon: Travis Jones M.D.;  Location: SURGERY [facility];  Service:    • COLONOSCOPY WITH BIOPSY N/A 5/7/2015    Procedure: COLONOSCOPY WITH BIOPSY;  Surgeon: Travis Jones M.D.;  Location: [facility];  Service:    • HYSTERECTOMY ROBOTIC  12/27/2013    Performed by Zoltan Salazar, " "M.D. at SURGERY [facility]   • CYSTOSCOPY STENT PLACEMENT  12/27/2013    Performed by Zoltan Salazar M.D. at SURGERY [facility]   • OTHER       Removal Rt sinus osteoma   • OTHER SURGICAL PROCEDURE      ORx3 cancer removal (vulvar Ca.)   • THYROIDECTOMY       Due to thyroid cancern   • TONSILLECTOMY       SOCHX:  reports that she is a non-smoker but has been exposed to tobacco smoke. She has never used smokeless tobacco. She reports that she drinks about 0.6 oz of alcohol per week . She reports that she does not use drugs.  FH: Family history was reviewed, no pertinent findings to report  Medications, Allergies, and current problem list reviewed today in Epic     Objective:     /80   Pulse 72   Temp 36.6 °C (97.8 °F)   Resp 16   Ht 1.473 m (4' 10\")   Wt 55.8 kg (123 lb)   SpO2 94%   BMI 25.71 kg/m²      Physical Exam   Constitutional: She is oriented to person, place, and time. She appears well-developed and well-nourished. She is active.  Non-toxic appearance. She does not have a sickly appearance. She does not appear ill. No distress.   HENT:   Head: Normocephalic and atraumatic.   Right Ear: External ear normal.   Left Ear: External ear normal.   Nose: Nose normal.   Mouth/Throat: Oropharynx is clear and moist.   Eyes: Conjunctivae, EOM and lids are normal.   Neck: Normal range of motion and full passive range of motion without pain. Neck supple.   Cardiovascular: Normal rate, regular rhythm, S1 normal, S2 normal and normal heart sounds.  Exam reveals no gallop and no friction rub.    No murmur heard.  Pulmonary/Chest: Effort normal and breath sounds normal. No respiratory distress. She has no decreased breath sounds. She has no wheezes. She has no rales. She exhibits no tenderness.   Abdominal: Soft. Normal appearance and bowel sounds are normal. She exhibits no shifting dullness, no distension, no pulsatile liver, no fluid wave, no abdominal bruit, no ascites, no pulsatile midline mass and " no mass. There is generalized tenderness. There is no rigidity, no rebound, no guarding, no CVA tenderness, no tenderness at McBurney's point and negative Keene's sign.   Musculoskeletal: Normal range of motion. She exhibits no edema, tenderness or deformity.   Neurological: She is alert and oriented to person, place, and time.   Skin: Skin is warm, dry and intact.   Psychiatric: She has a normal mood and affect. Her speech is normal and behavior is normal. Judgment and thought content normal.   Vitals reviewed.           1/17/2018 5:46 PM    HISTORY/REASON FOR EXAM:  Abdominal Pain.      TECHNIQUE/EXAM DESCRIPTION AND NUMBER OF VIEWS:  2 view(s) of the abdomen.    COMPARISON: May 5, 2015    FINDINGS:  No bowel dilatation identified. Gas and stool seen throughout the colon. Phleboliths are once again noted in the left side of the pelvis. No free air or fluid levels identified.   Impression       No evidence of obstruction, perforation or ileus.          Assessment/Plan:   Pt is an 82 year old female who presents with generalized abdominal pain for one week.  She states that she started taking NSAIDs for back pain when this began.  Pain comes and goes and is worse with eating.  She does have a history of peptic ulcers and bowel blockage.  Denies vomiting, diarrhea, constipation or blood in the stool.  Abdomen: Soft, mild generalized tenderness, nondistended. Normal bowel sounds. No hepatosplenomegaly or masses, or hernias. No rebound or guarding. Abd x ray normal, UA normal.  Possible gastritis vs peptic ulcer.  Lab is pending.    1. Acute gastritis without hemorrhage, unspecified gastritis type  POCT Urinalysis    URINE CULTURE(NEW)    [id number] VIEWS    H.PYLORI STOOL ANTIGEN    REFERRAL TO GASTROENTEROLOGY    CANCELED: H. PYLORI BREATH TEST       Differential diagnosis, natural history, supportive care, and indications for immediate follow-up discussed at length.   Follow-up with primary care provider within  4-5 days, emergency room precautions discussed.  Patient and/or family appears understanding of information.

## 2018-01-18 NOTE — PATIENT INSTRUCTIONS
Gastritis, Adult  Gastritis is soreness and swelling (inflammation) of the lining of the stomach. Gastritis can develop as a sudden onset (acute) or long-term (chronic) condition. If gastritis is not treated, it can lead to stomach bleeding and ulcers.  CAUSES   Gastritis occurs when the stomach lining is weak or damaged. Digestive juices from the stomach then inflame the weakened stomach lining. The stomach lining may be weak or damaged due to viral or bacterial infections. One common bacterial infection is the Helicobacter pylori infection. Gastritis can also result from excessive alcohol consumption, taking certain medicines, or having too much acid in the stomach.   SYMPTOMS   In some cases, there are no symptoms. When symptoms are present, they may include:  · Pain or a burning sensation in the upper abdomen.  · Nausea.  · Vomiting.  · An uncomfortable feeling of fullness after eating.  DIAGNOSIS   Your caregiver may suspect you have gastritis based on your symptoms and a physical exam. To determine the cause of your gastritis, your caregiver may perform the following:  · Blood or stool tests to check for the H pylori bacterium.  · Gastroscopy. A thin, flexible tube (endoscope) is passed down the esophagus and into the stomach. The endoscope has a light and camera on the end. Your caregiver uses the endoscope to view the inside of the stomach.  · Taking a tissue sample (biopsy) from the stomach to examine under a microscope.  TREATMENT   Depending on the cause of your gastritis, medicines may be prescribed. If you have a bacterial infection, such as an H pylori infection, antibiotics may be given. If your gastritis is caused by too much acid in the stomach, H2 blockers or antacids may be given. Your caregiver may recommend that you stop taking aspirin, ibuprofen, or other nonsteroidal anti-inflammatory drugs (NSAIDs).  HOME CARE INSTRUCTIONS  · Only take over-the-counter or prescription medicines as directed by  your caregiver.  · If you were given antibiotic medicines, take them as directed. Finish them even if you start to feel better.  · Drink enough fluids to keep your urine clear or pale yellow.  · Avoid foods and drinks that make your symptoms worse, such as:  ¨ Caffeine or alcoholic drinks.  ¨ Chocolate.  ¨ Peppermint or mint flavorings.  ¨ Garlic and onions.  ¨ Spicy foods.  ¨ Citrus fruits, such as oranges, bettina, or limes.  ¨ Tomato-based foods such as sauce, chili, salsa, and pizza.  ¨ Fried and fatty foods.  · Eat small, frequent meals instead of large meals.  SEEK IMMEDIATE MEDICAL CARE IF:   · You have black or dark red stools.  · You vomit blood or material that looks like coffee grounds.  · You are unable to keep fluids down.  · Your abdominal pain gets worse.  · You have a fever.  · You do not feel better after 1 week.  · You have any other questions or concerns.  MAKE SURE YOU:  · Understand these instructions.  · Will watch your condition.  · Will get help right away if you are not doing well or get worse.     This information is not intended to replace advice given to you by your health care provider. Make sure you discuss any questions you have with your health care provider.     Document Released: 12/12/2002 Document Revised: 06/18/2013 Document Reviewed: 01/30/2013  [employer] Interactive Patient Education ©2016 [employer]

## 2018-01-20 ENCOUNTER — HOSPITAL ENCOUNTER (OUTPATIENT)
Facility: MEDICAL CENTER | Age: 83
End: 2018-01-20
Attending: PHYSICIAN ASSISTANT
Payer: MEDICARE

## 2018-01-20 LAB
BACTERIA UR CULT: NORMAL
SIGNIFICANT IND 70042: NORMAL
SITE SITE: NORMAL
SOURCE SOURCE: NORMAL

## 2018-01-20 PROCEDURE — 87338 HPYLORI STOOL AG IA: CPT

## 2018-01-22 ENCOUNTER — HOSPITAL ENCOUNTER (OUTPATIENT)
Facility: MEDICAL CENTER | Age: 83
End: 2018-01-22
Attending: PHYSICIAN ASSISTANT
Payer: MEDICARE

## 2018-01-22 DIAGNOSIS — K29.00 ACUTE GASTRITIS WITHOUT HEMORRHAGE, UNSPECIFIED GASTRITIS TYPE: ICD-10-CM

## 2018-01-22 LAB — H PYLORI AG STL QL IA: NOT DETECTED

## 2018-02-12 ENCOUNTER — TELEPHONE (OUTPATIENT)
Dept: MEDICAL GROUP | Facility: MEDICAL CENTER | Age: 83
End: 2018-02-12

## 2018-02-12 NOTE — TELEPHONE ENCOUNTER
PVP WITH OUTREACH  Future Appointments       Provider Department Center    2/13/2018 9:00 AM [facility] US 1 [facility] IMAGING - ULTRASOUND - [facility]    2/13/2018 2:20 PM Ezequiel Grewal M.D.; [facility]  [facility]          ANNUAL WELLNESS VISIT PRE-VISIT PLANNING     1.  Immunizations were updated in Epic using WebIZ?: Yes       •  WebIZ Recommendations:   PCV-13 (Prevnar 13)   Zoster, Live (Shingles)   Influenza w/preserv.   Tdap            •  Is patient due for Tdap? NO       •  Is patient due for Shingles? YES. Patient was notified of copay/out of pocket cost.     2.  Specialty Comments was updated with diagnosis information provided by [facility]: NO

## 2018-02-13 ENCOUNTER — OFFICE VISIT (OUTPATIENT)
Dept: MEDICAL GROUP | Facility: MEDICAL CENTER | Age: 83
End: 2018-02-13
Payer: MEDICARE

## 2018-02-13 ENCOUNTER — HOSPITAL ENCOUNTER (OUTPATIENT)
Dept: RADIOLOGY | Facility: MEDICAL CENTER | Age: 83
End: 2018-02-13
Attending: NURSE PRACTITIONER
Payer: MEDICARE

## 2018-02-13 VITALS
HEART RATE: 106 BPM | WEIGHT: 125.6 LBS | TEMPERATURE: 98 F | BODY MASS INDEX: 26.36 KG/M2 | DIASTOLIC BLOOD PRESSURE: 70 MMHG | SYSTOLIC BLOOD PRESSURE: 138 MMHG | OXYGEN SATURATION: 97 % | HEIGHT: 58 IN

## 2018-02-13 DIAGNOSIS — R20.0 NUMBNESS OF RIGHT HAND: ICD-10-CM

## 2018-02-13 DIAGNOSIS — K21.9 GASTROESOPHAGEAL REFLUX DISEASE WITHOUT ESOPHAGITIS: ICD-10-CM

## 2018-02-13 DIAGNOSIS — N39.46 MIXED INCONTINENCE URGE AND STRESS: ICD-10-CM

## 2018-02-13 DIAGNOSIS — G47.33 OSA (OBSTRUCTIVE SLEEP APNEA): ICD-10-CM

## 2018-02-13 DIAGNOSIS — H91.13 PRESBYCUSIS OF BOTH EARS: ICD-10-CM

## 2018-02-13 DIAGNOSIS — R73.01 ELEVATED FASTING BLOOD SUGAR: ICD-10-CM

## 2018-02-13 DIAGNOSIS — J44.9 CHRONIC OBSTRUCTIVE PULMONARY DISEASE, UNSPECIFIED COPD TYPE (HCC): ICD-10-CM

## 2018-02-13 DIAGNOSIS — E03.9 HYPOTHYROIDISM, UNSPECIFIED TYPE: ICD-10-CM

## 2018-02-13 DIAGNOSIS — R73.03 PREDIABETES: ICD-10-CM

## 2018-02-13 DIAGNOSIS — J30.1 CHRONIC SEASONAL ALLERGIC RHINITIS DUE TO POLLEN: ICD-10-CM

## 2018-02-13 DIAGNOSIS — Z23 NEED FOR VACCINATION: ICD-10-CM

## 2018-02-13 DIAGNOSIS — F33.1 MDD (MAJOR DEPRESSIVE DISORDER), RECURRENT EPISODE, MODERATE (HCC): ICD-10-CM

## 2018-02-13 DIAGNOSIS — M15.9 PRIMARY OSTEOARTHRITIS INVOLVING MULTIPLE JOINTS: ICD-10-CM

## 2018-02-13 DIAGNOSIS — R26.89 BALANCE DISORDER: ICD-10-CM

## 2018-02-13 DIAGNOSIS — R10.13 ABDOMINAL PAIN, EPIGASTRIC: ICD-10-CM

## 2018-02-13 DIAGNOSIS — J45.20 MILD INTERMITTENT ASTHMA WITHOUT COMPLICATION: ICD-10-CM

## 2018-02-13 DIAGNOSIS — M81.0 AGE-RELATED OSTEOPOROSIS WITHOUT CURRENT PATHOLOGICAL FRACTURE: ICD-10-CM

## 2018-02-13 DIAGNOSIS — I48.0 PAROXYSMAL ATRIAL FIBRILLATION (HCC): ICD-10-CM

## 2018-02-13 DIAGNOSIS — J45.20 ASTHMA, MILD INTERMITTENT, WELL-CONTROLLED: ICD-10-CM

## 2018-02-13 DIAGNOSIS — I10 ESSENTIAL HYPERTENSION, BENIGN: ICD-10-CM

## 2018-02-13 DIAGNOSIS — Z00.00 MEDICARE ANNUAL WELLNESS VISIT, SUBSEQUENT: ICD-10-CM

## 2018-02-13 DIAGNOSIS — E53.8 B12 DEFICIENCY: ICD-10-CM

## 2018-02-13 PROCEDURE — G0009 ADMIN PNEUMOCOCCAL VACCINE: HCPCS | Performed by: FAMILY MEDICINE

## 2018-02-13 PROCEDURE — 90662 IIV NO PRSV INCREASED AG IM: CPT | Performed by: FAMILY MEDICINE

## 2018-02-13 PROCEDURE — 90670 PCV13 VACCINE IM: CPT | Performed by: FAMILY MEDICINE

## 2018-02-13 PROCEDURE — G0008 ADMIN INFLUENZA VIRUS VAC: HCPCS | Performed by: FAMILY MEDICINE

## 2018-02-13 PROCEDURE — 76700 US EXAM ABDOM COMPLETE: CPT

## 2018-02-13 PROCEDURE — G0439 PPPS, SUBSEQ VISIT: HCPCS | Mod: 25 | Performed by: FAMILY MEDICINE

## 2018-02-13 RX ORDER — OXYBUTYNIN CHLORIDE 5 MG/1
5 TABLET, EXTENDED RELEASE ORAL DAILY
Qty: 30 TAB | Refills: 5 | Status: SHIPPED | OUTPATIENT
Start: 2018-02-13 | End: 2018-03-02 | Stop reason: SDUPTHER

## 2018-02-13 ASSESSMENT — ACTIVITIES OF DAILY LIVING (ADL): BATHING_REQUIRES_ASSISTANCE: 0

## 2018-02-13 ASSESSMENT — PATIENT HEALTH QUESTIONNAIRE - PHQ9: CLINICAL INTERPRETATION OF PHQ2 SCORE: 0

## 2018-02-13 NOTE — PROGRESS NOTES
"Chief Complaint   Patient presents with   • Annual Wellness Visit         HPI:  Tania is a 83 y.o. here for Medicare Annual Wellness Visit        Patient Active Problem List    Diagnosis Date Noted   • Atrial fibrillation (CMS-HCC) 05/01/2015     Priority: High   • Essential hypertension, benign 08/19/2009     Priority: High   • Hard of hearing 05/03/2011     Priority: Medium   • Asthma, mild intermittent, well-controlled      Priority: Medium   • GERD (gastroesophageal reflux disease)      Priority: Medium   • Heart murmur      Priority: Medium   • COPD (chronic obstructive pulmonary disease) (CMS-HCC)      Priority: Medium   • B12 deficiency 05/03/2011     Priority: Low   • Mixed incontinence urge and stress      Priority: Low   • MDD (major depressive disorder), recurrent episode, moderate (CMS-HCC)      Priority: Low   • DJD (degenerative joint disease)      Priority: Low   • History of vulvar dysplasia      Priority: Low   • Hypothyroidism 08/19/2009     Priority: Low   • Mild intermittent asthma without complication 07/19/2017   • Osteoporosis 09/14/2016   • Seasonal allergies 09/14/2016   • Prediabetes 09/14/2016   • SEA (obstructive sleep apnea) 07/07/2016       Current Outpatient Prescriptions   Medication Sig Dispense Refill   • oxybutynin SR (DITROPAN-XL) 5 MG TABLET SR 24 HR Take 1 Tab by mouth every day. 30 Tab 5   • paroxetine (PAXIL) 20 MG Tab TAKE ONE TABLET BY MOUTH ONCE DAILY WITH FOOD 90 Tab 3   • omeprazole (PRILOSEC) 20 MG delayed-release capsule TAKE ONE CAPSULE BY MOUTH ONCE DAILY 90 Cap 3   • levothyroxine (SYNTHROID) 200 MCG Tab Take 1 Tab by mouth Every morning on an empty stomach.     • Fexofenadine HCl (MUCINEX ALLERGY PO) Take  by mouth.     • cyanocobalamin (VITAMIN B-12) 1000 MCG/ML Solution INJECT 1ML SUBCUTANEOUSLY ONCE WEEKLY  3   • B-D INSULIN SYRINGE 1CC/25GX1\" 25G X 1\" 1 ML Misc USE AS DIRECTED  2   • levalbuterol (XOPENEX HFA) 45 MCG/ACT inhaler Inhale 1-2 Puffs by mouth every " four hours as needed for Shortness of Breath.     • liothyronine (CYTOMEL) 25 MCG Tab Take 2 Tabs by mouth every bedtime.     • cetirizine (ZYRTEC) 10 MG Tab Take 1 Tab by mouth every day.     • Cyanocobalamin (B-12 PO) Take  by mouth.     • tiotropium (SPIRIVA) 18 MCG Cap Inhale 18 mcg by mouth every day.     • budesonide-formoterol (SYMBICORT) 160-4.5 MCG/ACT AERO Inhale 2 Puffs by mouth 2 Times a Day.       No current facility-administered medications for this visit.         Patient is taking medications as noted in medication list.  Current supplements as per medication list.     Allergies: Ace inhibitors and Albuterol    Current social contact/activities: Pt keeps herself busy with daily activities.     Is patient current with immunizations? No, due for FLU, PREVNAR (PCV13) , TDAP and ZOSTAVAX (Shingles). Patient is interested in receiving FLU and PREVNAR (PCV13)  today.    She  reports that she is a non-smoker but has been exposed to tobacco smoke. She has never used smokeless tobacco. She reports that she drinks about 0.6 oz of alcohol per week . She reports that she does not use drugs.  Counseling given: Not Answered        DPA/Advanced directive: Patient does not have an Advanced Directive.  A packet and workshop information was given on Advanced Directives.    ROS:    Gait: Uses a cane, wheelchair prn    Ostomy: no   Other tubes: no   Amputations: no   Chronic oxygen use no   Last eye exam 2 years ago     Wears hearing aids: yes   : Reports urinary leakage during the last 6 months that has somewhat interfered with their daily activities or sleep.      **    Depression Screening    Little interest or pleasure in doing things?  0 - not at all  Feeling down, depressed, or hopeless? 0 - not at all  Patient Health Questionnaire Score: 0    If depressive symptoms identified deferred to follow up visit unless specifically addressed in assessment and plan.    Interpretation of PHQ-9 Total Score   Score Severity    1-4 No Depression   5-9 Mild Depression   10-14 Moderate Depression   15-19 Moderately Severe Depression   20-27 Severe Depression    Screening for Cognitive Impairment    Three Minute Recall (apple, watch, francesca)  3/3    Draw clock face with all 12 numbers set to the hand to show 10 minutes past 11 o'clock  1 5/5  If cognitive concerns identified, deferred for follow up unless specifically addressed in assessment and plan.    Fall Risk Assessment    Has the patient had two or more falls in the last year or any fall with injury in the last year?  Yes  If fall risk identified, deferred for follow up unless specifically addressed in assessment and plan.    Safety Assessment    Throw rugs on floor.  No  Handrails on all stairs.  Yes  Good lighting in all hallways.  Yes  Difficulty hearing.  No  Patient counseled about all safety risks that were identified.    Functional Assessment ADLs    Are there any barriers preventing you from cooking for yourself or meeting nutritional needs?  No.    Are there any barriers preventing you from driving safely or obtaining transportation?  No.    Are there any barriers preventing you from using a telephone or calling for help?  No.    Are there any barriers preventing you from shopping?  No.    Are there any barriers preventing you from taking care of your own finances?  No.    Are there any barriers preventing you from managing your medications?  No.    Are there any barriers preventing you from showering/bathing yourself?  No.    Are you currently engaging any exercise or physical activity?  No.       Health Maintenance Summary                IMM DTaP/Tdap/Td Vaccine Overdue 2/9/1954     IMM ZOSTER VACCINE Overdue 2/9/1996     IMM PNEUMOCOCCAL 65+ (ADULT) LOW/MEDIUM RISK SERIES Overdue 7/13/2016      Done 7/13/2015 Imm Admin: Pneumococcal polysaccharide vaccine (PPSV-23)     Patient has more history with this topic...    PFT SCREENING-FEV1 AND FEV/FVC RATIO / SPIROMETRY SHOULD BE  PERFORMED ANNUALLY Overdue 5/17/2017      Done 5/17/2016 AMB SIMPLE SPIROMETRY     Patient has more history with this topic...    IMM INFLUENZA Overdue 9/1/2017      Done 10/1/2015 Imm Admin: Influenza TIV (IM)     Patient has more history with this topic...    Annual Wellness Visit Overdue 9/15/2017      Done 9/14/2016 Visit Dx: Medicare annual wellness visit, subsequent     Patient has more history with this topic...    BONE DENSITY Next Due 9/16/2019      Done 9/16/2014 DS-BONE DENSITY STUDY (DEXA)     Patient has more history with this topic...    COLONOSCOPY Next Due 5/28/2020      Done 5/28/2015 AMB REFERRAL TO GI FOR COLONOSCOPY     Patient has more history with this topic...          Patient Care Team:  Ezequiel Grewal M.D. as PCP - General (Family Medicine)  Cipriano Ortiz M.D. as Consulting Physician (Endocrinology)  Zoltan Salazar M.D. as Consulting Physician (Gynecologic Oncology)  ERIKA Borrego as Mid Level Provider (Cardiology)  [employer]  as Respiratory (DME Supplier)  Luis Enrique Pulido M.D. as Consulting Physician (Otolaryngology)  Zoltan Salazar M.D. as Consulting Physician (Gynecologic Oncology)  Dyan Baradles M.D. as Consulting Physician (Pulmonary Medicine)  Sylvain Romano M.D. as Consulting Physician (Ophthalmology)    Social History   Substance Use Topics   • Smoking status: Passive Smoke Exposure - Never Smoker   • Smokeless tobacco: Never Used   • Alcohol use 0.6 oz/week     1 Standard drinks or equivalent per week      Comment: rarely     Family History   Problem Relation Age of Onset   • Heart Disease Mother      [email or web address]   • Cancer Father      throat Cancer   • Heart Disease Sister    • Cancer Brother      brain tumor   • Cancer Sister      breast   • Cancer Sister      lung   • Other Sister      myeloma     She  has a past medical history of Allergic rhinitis; Arthritis; ASTHMA; Atrial fibrillation (CMS-HCC) (January 2015); B12 deficiency; Cancer (CMS-HCC); CHI (closed head injury);  "COPD (chronic obstructive pulmonary disease) (CMS-HCC); Degenerative joint disease; Dental disorder; Depression; GERD (gastroesophageal reflux disease); Hemorrhoids; [place] (hard of hearing) ( ); HSV-1 infection; Hypertension; Hypothyroidism; MEDICAL HOME; Meningitis; Osteoporosis; Shingles; Sinusitis due to Moraxella catarrhalis; Stress incontinence; Thyroid cancer (CMS-HCC); and Vulvar cancer (CMS-HCC). She also has no past medical history of Breast cancer (CMS-HCC).   Past Surgical History:   Procedure Laterality Date   • GASTROSCOPY WITH BIOPSY N/A 5/7/2015    Procedure: GASTROSCOPY WITH BIOPSY;  Surgeon: Travis Jones M.D.;  Location: SURGERY [facility];  Service:    • COLONOSCOPY WITH BIOPSY N/A 5/7/2015    Procedure: COLONOSCOPY WITH BIOPSY;  Surgeon: Travis Jones M.D.;  Location: [facility];  Service:    • HYSTERECTOMY ROBOTIC  12/27/2013    Performed by Zoltan Salazar M.D. at SURGERY [facility]   • CYSTOSCOPY STENT PLACEMENT  12/27/2013    Performed by Zoltan Salazar M.D. at [facility]   • OTHER       Removal Rt sinus osteoma   • OTHER SURGICAL PROCEDURE      ORx3 cancer removal (vulvar Ca.)   • THYROIDECTOMY       Due to thyroid cancern   • TONSILLECTOMY             Exam:     Blood pressure 138/70, pulse (!) 106, temperature 36.7 °C (98 °F), height 1.473 m (4' 10\"), weight 57 kg (125 lb 9.6 oz), SpO2 97 %. Body mass index is 26.25 kg/m².    Constitutional: Alert, no distress.  Skin: Warm, dry, good turgor, no rashes in visible areas.  Eye: Equal, round and reactive, conjunctiva clear, lids normal.  ENMT: Lips without lesions, good dentition, oropharynx clear. TM pearly gray bilaterally  Psych: Alert and oriented x3, normal affect and mood.      Assessment and Plan. The following treatment and monitoring plan is recommended:    1. Medicare annual wellness visit, subsequent  Doing well for her age.  - Annual Wellness Visit - Includes PPPS Subsequent ()    2. " Chronic obstructive pulmonary disease, unspecified COPD type (CMS-HCC)  Controlled. Continue Spiriva and Symbicort.  - Annual Wellness Visit - Includes PPPS Subsequent ()    3. Paroxysmal atrial fibrillation (CMS-HCC)  Asymptomatic. She developed a GI bleed with Coumadin. She declines taking aspirin.  - Annual Wellness Visit - Includes PPPS Subsequent ()    4. MDD (major depressive disorder), recurrent episode, moderate (CMS-HCC)  Controlled. Continue paroxetine.  - Annual Wellness Visit - Includes PPPS Subsequent ()    5. Need for vaccination  - INFLUENZA VACCINE, HIGH DOSE (65+ ONLY)  - PNEUMOCOCCAL CONJUGATE VACCINE 13-VALENT  - Annual Wellness Visit - Includes PPPS Subsequent ()    6. Essential hypertension, benign  Controlled off medication currently.  - CBC WITH DIFFERENTIAL; Future  - LIPID PROFILE; Future  - Annual Wellness Visit - Includes PPPS Subsequent ()    7. Hypothyroidism, unspecified type  Continue levothyroxine and Cytomel. Check labs and call with results.  - TSH WITH REFLEX TO FT4; Future  - TRIIODOTHYRONINE (T3)  - Annual Wellness Visit - Includes PPPS Subsequent ()    8. Prediabetes  Advised diet and exercise.  - Annual Wellness Visit - Includes PPPS Subsequent ()    9. Elevated fasting blood sugar  Advised diet and exercise. Check labs and call with results.  - HEMOGLOBIN A1C; Future  - Annual Wellness Visit - Includes PPPS Subsequent ()    10. Asthma, mild intermittent, well-controlled  Controlled with Symbicort.  - Annual Wellness Visit - Includes PPPS Subsequent ()    11. Gastroesophageal reflux disease without esophagitis  Controlled with omeprazole.  - Annual Wellness Visit - Includes PPPS Subsequent ()    12. Primary osteoarthritis involving multiple joints  Stable. Continue to monitor.  - Patient identified as fall risk.  Appropriate orders and counseling given.  - Annual Wellness Visit - Includes PPPS Subsequent ()    13.  Age-related osteoporosis without current pathological fracture  Patient is trying to do weightbearing exercise.  - Annual Wellness Visit - Includes PPPS Subsequent ()    14. SEA (obstructive sleep apnea)  Patient declined CPAP.  - Annual Wellness Visit - Includes PPPS Subsequent ()    15. Chronic seasonal allergic rhinitis due to pollen  Controlled with Zyrtec.  - Annual Wellness Visit - Includes PPPS Subsequent ()    16. Mild intermittent asthma without complication  Controlled with Symbicort.  - Annual Wellness Visit - Includes PPPS Subsequent ()    17. B12 deficiency  Continue vitamin B-12 injections once weekly.  - VITAMIN B12; Future  - Annual Wellness Visit - Includes PPPS Subsequent ()    18. Presbycusis of both ears  Continued bilateral hearing aids.  - Annual Wellness Visit - Includes PPPS Subsequent ()    19. Numbness of right hand  Trial of wrist brace to see if this is related to carpal tunnel. This could also be related to shingles side effect.  - Annual Wellness Visit - Includes PPPS Subsequent ()    20. Balance disorder  Referral to physical therapy.  - Annual Wellness Visit - Includes PPPS Subsequent ()  - REFERRAL TO PHYSICAL THERAPY Reason for Therapy: Eval/Treat/Report    21. Mixed incontinence urge and stress  Uncontrolled. Trial of oxybutynin per patient's request to try medication.  - Annual Wellness Visit - Includes PPPS Subsequent ()        Services suggested: No services needed at this time  Health Care Screening recommendations as per orders if indicated.  Referrals offered: PT/OT/Nutrition counseling/Behavioral Health/Smoking cessation as per orders if indicated.    Discussion today about general wellness and lifestyle habits:    · Prevent falls and reduce trip hazards; Cautioned about securing or removing rugs.  · Have a working fire alarm and carbon monoxide detector;   · Engage in regular physical activity and social activities        Follow-up: Return in about 1 year (around 2/13/2019) for Annual.

## 2018-02-14 ENCOUNTER — HOSPITAL ENCOUNTER (OUTPATIENT)
Dept: LAB | Facility: MEDICAL CENTER | Age: 83
End: 2018-02-14
Attending: FAMILY MEDICINE
Payer: MEDICARE

## 2018-02-14 ENCOUNTER — HOSPITAL ENCOUNTER (OUTPATIENT)
Dept: LAB | Facility: MEDICAL CENTER | Age: 83
End: 2018-02-14
Attending: NURSE PRACTITIONER
Payer: MEDICARE

## 2018-02-14 DIAGNOSIS — E53.8 B12 DEFICIENCY: ICD-10-CM

## 2018-02-14 DIAGNOSIS — I10 ESSENTIAL HYPERTENSION, BENIGN: ICD-10-CM

## 2018-02-14 DIAGNOSIS — E03.9 HYPOTHYROIDISM, UNSPECIFIED TYPE: ICD-10-CM

## 2018-02-14 DIAGNOSIS — R73.01 ELEVATED FASTING BLOOD SUGAR: ICD-10-CM

## 2018-02-14 LAB
ALBUMIN SERPL BCP-MCNC: 3.6 G/DL (ref 3.2–4.9)
ALBUMIN/GLOB SERPL: 1.1 G/DL
ALP SERPL-CCNC: 92 U/L (ref 30–99)
ALT SERPL-CCNC: 14 U/L (ref 2–50)
ANION GAP SERPL CALC-SCNC: 3 MMOL/L (ref 0–11.9)
AST SERPL-CCNC: 13 U/L (ref 12–45)
BASOPHILS # BLD AUTO: 0.6 % (ref 0–1.8)
BASOPHILS # BLD: 0.03 K/UL (ref 0–0.12)
BILIRUB SERPL-MCNC: 0.8 MG/DL (ref 0.1–1.5)
BUN SERPL-MCNC: 15 MG/DL (ref 8–22)
CALCIUM SERPL-MCNC: 9.2 MG/DL (ref 8.5–10.5)
CHLORIDE SERPL-SCNC: 105 MMOL/L (ref 96–112)
CHOLEST SERPL-MCNC: 204 MG/DL (ref 100–199)
CO2 SERPL-SCNC: 29 MMOL/L (ref 20–33)
CREAT SERPL-MCNC: 0.73 MG/DL (ref 0.5–1.4)
EOSINOPHIL # BLD AUTO: 0.13 K/UL (ref 0–0.51)
EOSINOPHIL NFR BLD: 2.5 % (ref 0–6.9)
ERYTHROCYTE [DISTWIDTH] IN BLOOD BY AUTOMATED COUNT: 40.7 FL (ref 35.9–50)
GLOBULIN SER CALC-MCNC: 3.2 G/DL (ref 1.9–3.5)
GLUCOSE SERPL-MCNC: 100 MG/DL (ref 65–99)
HCT VFR BLD AUTO: 39 % (ref 37–47)
HDLC SERPL-MCNC: 46 MG/DL
HGB BLD-MCNC: 13 G/DL (ref 12–16)
IMM GRANULOCYTES # BLD AUTO: 0.02 K/UL (ref 0–0.11)
IMM GRANULOCYTES NFR BLD AUTO: 0.4 % (ref 0–0.9)
LDLC SERPL CALC-MCNC: 127 MG/DL
LIPASE SERPL-CCNC: 7 U/L (ref 11–82)
LYMPHOCYTES # BLD AUTO: 1.07 K/UL (ref 1–4.8)
LYMPHOCYTES NFR BLD: 20.8 % (ref 22–41)
MCH RBC QN AUTO: 29.8 PG (ref 27–33)
MCHC RBC AUTO-ENTMCNC: 33.3 G/DL (ref 33.6–35)
MCV RBC AUTO: 89.4 FL (ref 81.4–97.8)
MONOCYTES # BLD AUTO: 0.36 K/UL (ref 0–0.85)
MONOCYTES NFR BLD AUTO: 7 % (ref 0–13.4)
NEUTROPHILS # BLD AUTO: 3.54 K/UL (ref 2–7.15)
NEUTROPHILS NFR BLD: 68.7 % (ref 44–72)
NRBC # BLD AUTO: 0 K/UL
NRBC BLD-RTO: 0 /100 WBC
PLATELET # BLD AUTO: 330 K/UL (ref 164–446)
PMV BLD AUTO: 10.2 FL (ref 9–12.9)
POTASSIUM SERPL-SCNC: 4.8 MMOL/L (ref 3.6–5.5)
PROT SERPL-MCNC: 6.8 G/DL (ref 6–8.2)
RBC # BLD AUTO: 4.36 M/UL (ref 4.2–5.4)
SODIUM SERPL-SCNC: 137 MMOL/L (ref 135–145)
T3 SERPL-MCNC: 115.4 NG/DL (ref 60–181)
T4 FREE SERPL-MCNC: 2.26 NG/DL (ref 0.53–1.43)
TRIGL SERPL-MCNC: 156 MG/DL (ref 0–149)
TSH SERPL DL<=0.005 MIU/L-ACNC: <0.005 UIU/ML (ref 0.38–5.33)
VIT B12 SERPL-MCNC: 458 PG/ML (ref 211–911)
WBC # BLD AUTO: 5.2 K/UL (ref 4.8–10.8)

## 2018-02-14 PROCEDURE — 84480 ASSAY TRIIODOTHYRONINE (T3): CPT

## 2018-02-14 PROCEDURE — 80061 LIPID PANEL: CPT

## 2018-02-14 PROCEDURE — 80053 COMPREHEN METABOLIC PANEL: CPT

## 2018-02-14 PROCEDURE — 83690 ASSAY OF LIPASE: CPT

## 2018-02-14 PROCEDURE — 36415 COLL VENOUS BLD VENIPUNCTURE: CPT

## 2018-02-14 PROCEDURE — 84443 ASSAY THYROID STIM HORMONE: CPT

## 2018-02-14 PROCEDURE — 85025 COMPLETE CBC W/AUTO DIFF WBC: CPT

## 2018-02-14 PROCEDURE — 82607 VITAMIN B-12: CPT

## 2018-02-14 PROCEDURE — 83036 HEMOGLOBIN GLYCOSYLATED A1C: CPT

## 2018-02-14 PROCEDURE — 84439 ASSAY OF FREE THYROXINE: CPT

## 2018-02-15 ENCOUNTER — TELEPHONE (OUTPATIENT)
Dept: MEDICAL GROUP | Facility: MEDICAL CENTER | Age: 83
End: 2018-02-15

## 2018-02-15 LAB
EST. AVERAGE GLUCOSE BLD GHB EST-MCNC: 111 MG/DL
HBA1C MFR BLD: 5.5 % (ref 0–5.6)

## 2018-02-15 NOTE — TELEPHONE ENCOUNTER
----- Message from Ezequiel Grewal M.D. sent at 2/15/2018 12:59 PM PST -----  Please notify patient that her average blood sugar, vitamin B12 and blood counts are normal.  Her thyroid is on the hyperthyroid side again. Please make sure she is continuing to see her endocrinologist for management of her thyroid condition.  Ezequiel Grewal M.D.

## 2018-02-21 ENCOUNTER — OFFICE VISIT (OUTPATIENT)
Dept: URGENT CARE | Facility: CLINIC | Age: 83
End: 2018-02-21
Payer: MEDICARE

## 2018-02-21 ENCOUNTER — APPOINTMENT (OUTPATIENT)
Dept: RADIOLOGY | Facility: IMAGING CENTER | Age: 83
End: 2018-02-21
Attending: PHYSICIAN ASSISTANT
Payer: MEDICARE

## 2018-02-21 VITALS
HEIGHT: 58 IN | WEIGHT: 125 LBS | HEART RATE: 88 BPM | BODY MASS INDEX: 26.24 KG/M2 | TEMPERATURE: 100.1 F | DIASTOLIC BLOOD PRESSURE: 80 MMHG | OXYGEN SATURATION: 96 % | RESPIRATION RATE: 16 BRPM | SYSTOLIC BLOOD PRESSURE: 122 MMHG

## 2018-02-21 DIAGNOSIS — J06.9 URI WITH COUGH AND CONGESTION: ICD-10-CM

## 2018-02-21 DIAGNOSIS — J40 BRONCHITIS: ICD-10-CM

## 2018-02-21 PROCEDURE — 99214 OFFICE O/P EST MOD 30 MIN: CPT | Performed by: PHYSICIAN ASSISTANT

## 2018-02-21 PROCEDURE — 71046 X-RAY EXAM CHEST 2 VIEWS: CPT | Mod: 26 | Performed by: PHYSICIAN ASSISTANT

## 2018-02-21 RX ORDER — METHYLPREDNISOLONE 4 MG/1
TABLET ORAL
Qty: 21 TAB | Refills: 0 | Status: SHIPPED | OUTPATIENT
Start: 2018-02-21 | End: 2019-01-27

## 2018-02-21 RX ORDER — PROMETHAZINE HYDROCHLORIDE AND CODEINE PHOSPHATE 6.25; 1 MG/5ML; MG/5ML
5 SYRUP ORAL 4 TIMES DAILY PRN
Qty: 240 ML | Refills: 0 | Status: SHIPPED | OUTPATIENT
Start: 2018-02-21 | End: 2018-03-07

## 2018-02-21 RX ORDER — DOXYCYCLINE HYCLATE 100 MG
100 TABLET ORAL 2 TIMES DAILY
Qty: 14 TAB | Refills: 0 | Status: SHIPPED | OUTPATIENT
Start: 2018-02-21 | End: 2018-02-28

## 2018-02-21 ASSESSMENT — PATIENT HEALTH QUESTIONNAIRE - PHQ9: CLINICAL INTERPRETATION OF PHQ2 SCORE: 0

## 2018-02-21 NOTE — PROGRESS NOTES
Subjective:      Pt is a 83 y.o. female who presents with Cough (x4-5days, chest feeling tight, yellow color phlegm, hurts abd from cough now)            HPI  PT presents to UC clinic today complaining of sore throat,  pressure in ears, cough, fatigue, runny nose, wheezing and SOB. PT denies CP, NVD, abdominal pain, joint pain. PT states these symptoms began around 4-5 days ago. PT states the pain is a 7/10 from coughing fits, aching in nature and worse at night.  Pt has not taken any RX medications for this condition. The pt's medication list, problem list, and allergies have been evaluated and reviewed during today's visit.    PMH:  Past Medical History:   Diagnosis Date   • Allergic rhinitis    • Arthritis     Hands   • ASTHMA    • Atrial fibrillation (CMS-HCC) January 2015    New onset. Echocardiogram with normal LV size, mild concentric LVH, LVEF 65-70%. Normal RA and LA. Mild MR, mild TR, RVSP 35-40mmHg. June 2015: GI bleed, Coumadin stopped/contraindicated.   • B12 deficiency    • Cancer (CMS-HCC)     Vulvar; skin; thyroid   • CHI (closed head injury)    • COPD (chronic obstructive pulmonary disease) (CMS-[facility])    • Degenerative joint disease    • Dental disorder     One broken tooth   • Depression    • GERD (gastroesophageal reflux disease)    • Hemorrhoids    • [place] (hard of hearing)      Bilaterally   • HSV-1 infection    • Hypertension    • Hypothyroidism    • MEDICAL HOME    • Meningitis    • Osteoporosis    • Shingles    • Sinusitis due to Moraxella catarrhalis    • Stress incontinence    • Thyroid cancer (CMS-[facility])    • Vulvar cancer (CMS-[facility])        PSH:  Past Surgical History:   Procedure Laterality Date   • GASTROSCOPY WITH BIOPSY N/A 5/7/2015    Procedure: GASTROSCOPY WITH BIOPSY;  Surgeon: Travis Jones M.D.;  Location: SURGERY [facility];  Service:    • COLONOSCOPY WITH BIOPSY N/A 5/7/2015    Procedure: COLONOSCOPY WITH BIOPSY;  Surgeon: Travis Jones M.D.;  Location: [facility]  "[facility] ORS;  Service:    • HYSTERECTOMY ROBOTIC  2013    Performed by Zoltan Salazar M.D. at SURGERY [facility] ORS   • CYSTOSCOPY STENT PLACEMENT  2013    Performed by Zoltan Salazar M.D. at SURGERY [facility] ORS   • OTHER       Removal Rt sinus osteoma   • OTHER SURGICAL PROCEDURE      ORx3 cancer removal (vulvar Ca.)   • THYROIDECTOMY       Due to thyroid cancern   • TONSILLECTOMY         Fam Hx:    family history includes Cancer in her brother, father, sister, and sister; Heart Disease in her mother and sister; Other in her sister.  Family Status   Relation Status   • Mother    • Father    • Sister Alive   • Brother    • Sister Alive   • Brother    • Maternal Grandmother    • Maternal Grandfather    • Paternal Grandmother    • Paternal Grandfather    • Sister    • Sister    • Sister    • Sister    • Sister        Soc HX:  Social History     Social History   • Marital status:      Spouse name: N/A   • Number of children: N/A   • Years of education: N/A     Occupational History   • Not on file.     Social History Main Topics   • Smoking status: Passive Smoke Exposure - Never Smoker   • Smokeless tobacco: Never Used   • Alcohol use 0.6 oz/week     1 Standard drinks or equivalent per week      Comment: rarely   • Drug use: No   • Sexual activity: Not on file     Other Topics Concern   • Not on file     Social History Narrative   • No narrative on file         Medications:    Current Outpatient Prescriptions:   •  oxybutynin SR (DITROPAN-XL) 5 MG TABLET SR 24 HR, Take 1 Tab by mouth every day., Disp: 30 Tab, Rfl: 5  •  cyanocobalamin (VITAMIN B-12) 1000 MCG/ML Solution, INJECT 1ML SUBCUTANEOUSLY ONCE WEEKLY, Disp: , Rfl: 3  •  B-D INSULIN SYRINGE 1CC/25GX1\" 25G X 1\" 1 ML Misc, USE AS DIRECTED, Disp: , Rfl: 2  •  paroxetine (PAXIL) 20 MG Tab, TAKE ONE TABLET BY MOUTH ONCE DAILY WITH FOOD, Disp: 90 " Tab, Rfl: 3  •  omeprazole (PRILOSEC) 20 MG delayed-release capsule, TAKE ONE CAPSULE BY MOUTH ONCE DAILY, Disp: 90 Cap, Rfl: 3  •  cetirizine (ZYRTEC) 10 MG Tab, Take 1 Tab by mouth every day., Disp: , Rfl:   •  levothyroxine (SYNTHROID) 200 MCG Tab, Take 1 Tab by mouth Every morning on an empty stomach., Disp: , Rfl:   •  Cyanocobalamin (B-12 PO), Take  by mouth., Disp: , Rfl:   •  tiotropium (SPIRIVA) 18 MCG Cap, Inhale 18 mcg by mouth every day., Disp: , Rfl:   •  budesonide-formoterol (SYMBICORT) 160-4.5 MCG/ACT AERO, Inhale 2 Puffs by mouth 2 Times a Day., Disp: , Rfl:   •  levalbuterol (XOPENEX HFA) 45 MCG/ACT inhaler, Inhale 1-2 Puffs by mouth every four hours as needed for Shortness of Breath., Disp: , Rfl:   •  liothyronine (CYTOMEL) 25 MCG Tab, Take 2 Tabs by mouth every bedtime., Disp: , Rfl:   •  Fexofenadine HCl (MUCINEX ALLERGY PO), Take  by mouth., Disp: , Rfl:       Allergies:  Ace inhibitors and Albuterol    ROS    Review of Systems   Constitutional: Positive for malaise/fatigue. Negative for fever and diaphoresis.   HENT: Positive for congestion, ear pain and sore throat. Negative for ear discharge, hearing loss, nosebleeds and tinnitus.    Eyes: Negative for blurred vision, double vision and photophobia.   Respiratory: Positive for cough, sputum production, shortness of breath and wheezing. Negative for hemoptysis.    Cardiovascular: Negative for chest pain and palpitations.   Gastrointestinal: Negative for nausea, vomiting, abdominal pain, diarrhea and constipation.   Genitourinary: Negative for dysuria and flank pain.   Musculoskeletal: Negative for joint pain and myalgias.   Skin: Negative for itching and rash.   Neurological: Positive for headaches. Negative for dizziness, tingling and weakness.   Endo/Heme/Allergies: Does not bruise/bleed easily.   Psychiatric/Behavioral: Negative for depression. The patient is not nervous/anxious.           Objective:     /80   Pulse 88   Temp 37.8  "°C (100.1 °F)   Resp 16   Ht 1.473 m (4' 10\")   Wt 56.7 kg (125 lb)   SpO2 96%   BMI 26.13 kg/m²      Physical Exam  Physical Exam   Constitutional: PT is oriented to person, place, and time. PT appears well-developed and well-nourished. No distress.   HENT:   Head: Normocephalic and atraumatic.   Right Ear: Hearing, tympanic membrane, external ear and ear canal normal.   Left Ear: Hearing, tympanic membrane, external ear and ear canal normal.   Nose: Mucosal edema, rhinorrhea and sinus tenderness present. Right sinus exhibits frontal sinus tenderness. Left sinus exhibits frontal sinus tenderness.   Mouth/Throat: Uvula is midline. Mucous membranes are pale. Posterior oropharyngeal edema and posterior oropharyngeal erythema present. No oropharyngeal exudate.   Eyes: Conjunctivae normal and EOM are normal. Pupils are equal, round, and reactive to light. Right eye exhibits no discharge. Left eye exhibits no discharge.   Neck: Normal range of motion. Neck supple. No thyromegaly present.   Cardiovascular: Normal rate, regular rhythm, normal heart sounds and intact distal pulses.  Exam reveals no gallop and no friction rub.    No murmur heard.  Pulmonary/Chest: Effort normal. No respiratory distress. PT has wheezes. PT has no rales. PT exhibits tenderness.   Abdominal: Soft. Bowel sounds are normal. PT exhibits no distension and no mass. There is no tenderness. There is no rebound and no guarding.   Musculoskeletal: Normal range of motion. PT exhibits no edema and no tenderness.   Lymphadenopathy:     PT has no cervical adenopathy.   Neurological: Pt is alert and oriented to person, place, and time. Pt has normal reflexes. No cranial nerve deficit.   Skin: Skin is warm and dry. No rash noted. No erythema.   Psychiatric: PT has a normal mood and affect. Pt behavior is normal. Judgment and thought content normal.        RADS;  Narrative       2/21/2018 1:46 PM    HISTORY/REASON FOR EXAM:  Shortness of Breath.  Cough. " Congestion.    TECHNIQUE/EXAM DESCRIPTION AND NUMBER OF VIEWS:  Two views of the chest.    COMPARISON:  5/1/2015    FINDINGS:  The heart is normal in size.  Linear atelectasis lung bases. No consolidation.  No pleural effusions are appreciated.     Impression       Linear atelectasis lung bases. No consolidation.   Reading Provider Reading Date   Ravi Hinson M.D. Feb 21, 2018   Signing Provider Signing Date Signing Time   Ravi Hinson M.D. Feb 21, 2018  2:01 PM       Assessment/Plan:     1. Bronchitis    - DX-CHEST-2 VIEWS; Future    2. URI with cough and congestion    - DX-CHEST-2 VIEWS; Future    Doxycycline  Codeine cough syrup  Medrol pack  Rest, fluids encouraged.  OTC decongestant for congestion/cough  Note given for work.  AVS with medical info given.  Pt was in full understanding and agreement with the plan.  Follow-up as needed if symptoms worsen or fail to improve.

## 2018-02-21 NOTE — PATIENT INSTRUCTIONS
Acute Bronchitis  Bronchitis is inflammation of the airways that extend from the windpipe into the lungs (bronchi). The inflammation often causes mucus to develop. This leads to a cough, which is the most common symptom of bronchitis.   In acute bronchitis, the condition usually develops suddenly and goes away over time, usually in a couple weeks. Smoking, allergies, and asthma can make bronchitis worse. Repeated episodes of bronchitis may cause further lung problems.   CAUSES  Acute bronchitis is most often caused by the same virus that causes a cold. The virus can spread from person to person (contagious) through coughing, sneezing, and touching contaminated objects.  SIGNS AND SYMPTOMS   · Cough.    · Fever.    · Coughing up mucus.    · Body aches.    · Chest congestion.    · Chills.    · Shortness of breath.    · Sore throat.    DIAGNOSIS   Acute bronchitis is usually diagnosed through a physical exam. Your health care provider will also ask you questions about your medical history. Tests, such as chest X-rays, are sometimes done to rule out other conditions.   TREATMENT   Acute bronchitis usually goes away in a couple weeks. Oftentimes, no medical treatment is necessary. Medicines are sometimes given for relief of fever or cough. Antibiotic medicines are usually not needed but may be prescribed in certain situations. In some cases, an inhaler may be recommended to help reduce shortness of breath and control the cough. A cool mist vaporizer may also be used to help thin bronchial secretions and make it easier to clear the chest.   HOME CARE INSTRUCTIONS  · Get plenty of rest.    · Drink enough fluids to keep your urine clear or pale yellow (unless you have a medical condition that requires fluid restriction). Increasing fluids may help thin your respiratory secretions (sputum) and reduce chest congestion, and it will prevent dehydration.    · Take medicines only as directed by your health care provider.  · If  you were prescribed an antibiotic medicine, finish it all even if you start to feel better.  · Avoid smoking and secondhand smoke. Exposure to cigarette smoke or irritating chemicals will make bronchitis worse. If you are a smoker, consider using nicotine gum or skin patches to help control withdrawal symptoms. Quitting smoking will help your lungs heal faster.    · Reduce the chances of another bout of acute bronchitis by washing your hands frequently, avoiding people with cold symptoms, and trying not to touch your hands to your mouth, nose, or eyes.    · Keep all follow-up visits as directed by your health care provider.    SEEK MEDICAL CARE IF:  Your symptoms do not improve after 1 week of treatment.   SEEK IMMEDIATE MEDICAL CARE IF:  · You develop an increased fever or chills.    · You have chest pain.    · You have severe shortness of breath.  · You have bloody sputum.    · You develop dehydration.  · You faint or repeatedly feel like you are going to pass out.  · You develop repeated vomiting.  · You develop a severe headache.  MAKE SURE YOU:   · Understand these instructions.  · Will watch your condition.  · Will get help right away if you are not doing well or get worse.     This information is not intended to replace advice given to you by your health care provider. Make sure you discuss any questions you have with your health care provider.     Document Released: 01/25/2006 Document Revised: 01/08/2016 Document Reviewed: 06/10/2014  [employer] Patient Education ©2016 [employer]

## 2018-02-28 ENCOUNTER — OFFICE VISIT (OUTPATIENT)
Dept: URGENT CARE | Facility: CLINIC | Age: 83
End: 2018-02-28
Payer: MEDICARE

## 2018-02-28 VITALS
DIASTOLIC BLOOD PRESSURE: 80 MMHG | SYSTOLIC BLOOD PRESSURE: 128 MMHG | OXYGEN SATURATION: 96 % | HEART RATE: 88 BPM | RESPIRATION RATE: 18 BRPM | WEIGHT: 120 LBS | BODY MASS INDEX: 25.19 KG/M2 | HEIGHT: 58 IN | TEMPERATURE: 99 F

## 2018-02-28 DIAGNOSIS — J98.01 BRONCHOSPASM: ICD-10-CM

## 2018-02-28 PROCEDURE — 99214 OFFICE O/P EST MOD 30 MIN: CPT | Performed by: PHYSICIAN ASSISTANT

## 2018-02-28 RX ORDER — METHYLPREDNISOLONE 4 MG/1
TABLET ORAL
Qty: 1 KIT | Refills: 0 | Status: SHIPPED | OUTPATIENT
Start: 2018-02-28 | End: 2019-01-27

## 2018-02-28 ASSESSMENT — ENCOUNTER SYMPTOMS
WHEEZING: 1
FEVER: 0
CHILLS: 0
SORE THROAT: 1
COUGH: 1
SHORTNESS OF BREATH: 1
SPUTUM PRODUCTION: 0
PALPITATIONS: 0
HEMOPTYSIS: 0

## 2018-02-28 NOTE — PROGRESS NOTES
Subjective:      Tania Aguilar is a 83 y.o. female who presents with Cough (X 3 days coughing again)            Cough   This is a recurrent problem. The current episode started in the past 7 days. The problem has been unchanged. The cough is non-productive. Associated symptoms include a sore throat, shortness of breath and wheezing. Pertinent negatives include no chest pain, chills, ear pain, fever or hemoptysis. Nothing aggravates the symptoms. She has tried prescription cough suppressant and oral steroids (antibiotic) for the symptoms. The treatment provided moderate relief.       Review of Systems   Constitutional: Negative for chills, fever and malaise/fatigue.   HENT: Positive for congestion and sore throat. Negative for ear pain.    Respiratory: Positive for cough, shortness of breath and wheezing. Negative for hemoptysis and sputum production.    Cardiovascular: Negative for chest pain and palpitations.   All other systems reviewed and are negative.    PMH:  has a past medical history of Allergic rhinitis; Arthritis; ASTHMA; Atrial fibrillation (CMS-HCC) (January 2015); B12 deficiency; Cancer (CMS-HCC); CHI (closed head injury); COPD (chronic obstructive pulmonary disease) (CMS-HCC); Degenerative joint disease; Dental disorder; Depression; GERD (gastroesophageal reflux disease); Hemorrhoids; [place] (hard of hearing) ( ); HSV-1 infection; Hypertension; Hypothyroidism; MEDICAL HOME; Meningitis; Osteoporosis; Shingles; Sinusitis due to Moraxella catarrhalis; Stress incontinence; Thyroid cancer (CMS-HCC); and Vulvar cancer (CMS-HCC). She also has no past medical history of Breast cancer (CMS-HCC).  MEDS:   Current Outpatient Prescriptions:   •  MethylPREDNISolone (MEDROL DOSEPAK) 4 MG Tablet Therapy Pack, Take as directed on package., Disp: 1 Kit, Rfl: 0  •  doxycycline (VIBRAMYCIN) 100 MG Tab, Take 1 Tab by mouth 2 times a day for 7 days., Disp: 14 Tab, Rfl: 0  •  promethazine-codeine (PHENERGAN-CODEINE)  "6.25-10 MG/5ML Syrup, Take 5 mL by mouth 4 times a day as needed for up to 14 days., Disp: 240 mL, Rfl: 0  •  MethylPREDNISolone (MEDROL DOSEPAK) 4 MG Tablet Therapy Pack, Use as directed, Disp: 21 Tab, Rfl: 0  •  oxybutynin SR (DITROPAN-XL) 5 MG TABLET SR 24 HR, Take 1 Tab by mouth every day., Disp: 30 Tab, Rfl: 5  •  cyanocobalamin (VITAMIN B-12) 1000 MCG/ML Solution, INJECT 1ML SUBCUTANEOUSLY ONCE WEEKLY, Disp: , Rfl: 3  •  B-D INSULIN SYRINGE 1CC/25GX1\" 25G X 1\" 1 ML Misc, USE AS DIRECTED, Disp: , Rfl: 2  •  levalbuterol (XOPENEX HFA) 45 MCG/ACT inhaler, Inhale 1-2 Puffs by mouth every four hours as needed for Shortness of Breath., Disp: , Rfl:   •  paroxetine (PAXIL) 20 MG Tab, TAKE ONE TABLET BY MOUTH ONCE DAILY WITH FOOD, Disp: 90 Tab, Rfl: 3  •  omeprazole (PRILOSEC) 20 MG delayed-release capsule, TAKE ONE CAPSULE BY MOUTH ONCE DAILY, Disp: 90 Cap, Rfl: 3  •  liothyronine (CYTOMEL) 25 MCG Tab, Take 2 Tabs by mouth every bedtime., Disp: , Rfl:   •  cetirizine (ZYRTEC) 10 MG Tab, Take 1 Tab by mouth every day., Disp: , Rfl:   •  levothyroxine (SYNTHROID) 200 MCG Tab, Take 1 Tab by mouth Every morning on an empty stomach., Disp: , Rfl:   •  Cyanocobalamin (B-12 PO), Take  by mouth., Disp: , Rfl:   •  tiotropium (SPIRIVA) 18 MCG Cap, Inhale 18 mcg by mouth every day., Disp: , Rfl:   •  Fexofenadine HCl (MUCINEX ALLERGY PO), Take  by mouth., Disp: , Rfl:   •  budesonide-formoterol (SYMBICORT) 160-4.5 MCG/ACT AERO, Inhale 2 Puffs by mouth 2 Times a Day., Disp: , Rfl:   ALLERGIES:   Allergies   Allergen Reactions   • Ace Inhibitors      Cough     • Albuterol      Whole body spasmed  Patient tolerates Formoterol (Symbicort) - takes at home     SURGHX:   Past Surgical History:   Procedure Laterality Date   • GASTROSCOPY WITH BIOPSY N/A 5/7/2015    Procedure: GASTROSCOPY WITH BIOPSY;  Surgeon: Travis Jones M.D.;  Location: SURGERY [facility];  Service:    • COLONOSCOPY WITH BIOPSY N/A 5/7/2015    " "Procedure: COLONOSCOPY WITH BIOPSY;  Surgeon: Travis Jones M.D.;  Location: SURGERY [facility];  Service:    • HYSTERECTOMY ROBOTIC  12/27/2013    Performed by Zoltan Salazar M.D. at SURGERY [facility]   • CYSTOSCOPY STENT PLACEMENT  12/27/2013    Performed by Zoltan Salazar M.D. at SURGERY [facility]   • OTHER       Removal Rt sinus osteoma   • OTHER SURGICAL PROCEDURE      ORx3 cancer removal (vulvar Ca.)   • THYROIDECTOMY       Due to thyroid cancern   • TONSILLECTOMY       SOCHX:  reports that she is a non-smoker but has been exposed to tobacco smoke. She has never used smokeless tobacco. She reports that she drinks about 0.6 oz of alcohol per week . She reports that she does not use drugs.  FH: Family history was reviewed, no pertinent findings to report  Medications, Allergies, and current problem list reviewed today in Epic       Objective:     /80   Pulse 88   Temp 37.2 °C (99 °F)   Resp 18   Ht 1.473 m (4' 10\")   Wt 54.4 kg (120 lb)   SpO2 96%   BMI 25.08 kg/m²      Physical Exam   Constitutional: She is oriented to person, place, and time. She appears well-developed and well-nourished. She is active.  Non-toxic appearance. She does not have a sickly appearance. She does not appear ill. No distress. She is not intubated.   HENT:   Head: Normocephalic and atraumatic.   Right Ear: Hearing, tympanic membrane, external ear and ear canal normal.   Left Ear: Hearing, tympanic membrane, external ear and ear canal normal.   Nose: Nose normal.   Mouth/Throat: Uvula is midline, oropharynx is clear and moist and mucous membranes are normal.   Eyes: Conjunctivae, EOM and lids are normal.   Neck: Normal range of motion. Neck supple.   Cardiovascular: Regular rhythm, S1 normal, S2 normal and normal heart sounds.  Exam reveals no gallop and no friction rub.    No murmur heard.  Pulmonary/Chest: Effort normal. No accessory muscle usage. No apnea, no tachypnea and no bradypnea. She is not " intubated. No respiratory distress. She has no decreased breath sounds. She has wheezes. She has no rhonchi. She has no rales. She exhibits no tenderness.   Musculoskeletal: Normal range of motion.   Neurological: She is alert and oriented to person, place, and time.   Skin: Skin is warm and dry.   Psychiatric: She has a normal mood and affect. Her speech is normal and behavior is normal. Judgment and thought content normal.   Vitals reviewed.              Assessment/Plan:   Pt seen 7 days ago for this problem.  Initially better with steroid/antibiotic but soon after cough worsened.  Chest x ray negative.  Wheezing throughout lung fields.  Most likely bronchospasm.  Pt allergic to albuterol.    1. Bronchospasm  MethylPREDNISolone (MEDROL DOSEPAK) 4 MG Tablet Therapy Pack       Differential diagnosis, natural history, supportive care discussed. Follow-up with primary care provider within 7-10 days, emergency room precautions discussed.  Patient and/or family appears understanding of information.

## 2018-02-28 NOTE — PATIENT INSTRUCTIONS
Bronchospasm, Adult  A bronchospasm is a spasm or tightening of the airways going into the lungs. During a bronchospasm breathing becomes more difficult because the airways get smaller. When this happens there can be coughing, a whistling sound when breathing (wheezing), and difficulty breathing. Bronchospasm is often associated with asthma, but not all patients who experience a bronchospasm have asthma.  CAUSES   A bronchospasm is caused by inflammation or irritation of the airways. The inflammation or irritation may be triggered by:   · Allergies (such as to animals, pollen, food, or mold). Allergens that cause bronchospasm may cause wheezing immediately after exposure or many hours later.    · Infection. Viral infections are believed to be the most common cause of bronchospasm.    · Exercise.    · Irritants (such as pollution, cigarette smoke, strong odors, aerosol sprays, and paint fumes).    · Weather changes. Winds increase molds and pollens in the air. Rain refreshes the air by washing irritants out. Cold air may cause inflammation.    · Stress and emotional upset.    SIGNS AND SYMPTOMS   · Wheezing.    · Excessive nighttime coughing.    · Frequent or severe coughing with a simple cold.    · Chest tightness.    · Shortness of breath.    DIAGNOSIS   Bronchospasm is usually diagnosed through a history and physical exam. Tests, such as chest X-rays, are sometimes done to look for other conditions.  TREATMENT   · Inhaled medicines can be given to open up your airways and help you breathe. The medicines can be given using either an inhaler or a nebulizer machine.  · Corticosteroid medicines may be given for severe bronchospasm, usually when it is associated with asthma.  HOME CARE INSTRUCTIONS   · Always have a plan prepared for seeking medical care. Know when to call your health care provider and local emergency services (911 in the [place] Know where you can access local emergency care.  · Only take medicines as  directed by your health care provider.  · If you were prescribed an inhaler or nebulizer machine, ask your health care provider to explain how to use it correctly. Always use a spacer with your inhaler if you were given one.  · It is necessary to remain calm during an attack. Try to relax and breathe more slowly.   · Control your home environment in the following ways:    ¨ Change your heating and air conditioning filter at least once a month.    ¨ Limit your use of fireplaces and wood stoves.  ¨ Do not smoke and do not allow smoking in your home.    ¨ Avoid exposure to perfumes and fragrances.    ¨ Get rid of pests (such as roaches and mice) and their droppings.    ¨ Throw away plants if you see mold on them.    ¨ Keep your house clean and dust free.    ¨ Replace carpet with wood, tile, or vinyl kameron. Carpet can trap dander and dust.    ¨ Use allergy-proof pillows, mattress covers, and box spring covers.    ¨ Wash bed sheets and blankets every week in hot water and dry them in a dryer.    ¨ Use blankets that are made of polyester or cotton.    ¨ Wash hands frequently.  SEEK MEDICAL CARE IF:   · You have muscle aches.    · You have chest pain.    · The sputum changes from clear or white to yellow, green, gray, or bloody.    · The sputum you cough up gets thicker.    · There are problems that may be related to the medicine you are given, such as a rash, itching, swelling, or trouble breathing.    SEEK IMMEDIATE MEDICAL CARE IF:   · You have worsening wheezing and coughing even after taking your prescribed medicines.    · You have increased difficulty breathing.    · You develop severe chest pain.  MAKE SURE YOU:   · Understand these instructions.  · Will watch your condition.  · Will get help right away if you are not doing well or get worse.     This information is not intended to replace advice given to you by your health care provider. Make sure you discuss any questions you have with your health care  provider.     Document Released: 12/20/2004 Document Revised: 01/08/2016 Document Reviewed: 06/09/2014  [employer] Interactive Patient Education ©2016 [employer]

## 2018-03-02 RX ORDER — OXYBUTYNIN CHLORIDE 5 MG/1
5 TABLET, EXTENDED RELEASE ORAL DAILY
Qty: 90 TAB | Refills: 3 | Status: SHIPPED | OUTPATIENT
Start: 2018-03-02 | End: 2019-05-22 | Stop reason: SDUPTHER

## 2018-03-12 ENCOUNTER — PHYSICAL THERAPY (OUTPATIENT)
Dept: PHYSICAL THERAPY | Facility: MEDICAL CENTER | Age: 83
End: 2018-03-12
Attending: FAMILY MEDICINE
Payer: MEDICARE

## 2018-03-12 DIAGNOSIS — R26.89 UNSTABLE BALANCE: ICD-10-CM

## 2018-03-12 PROCEDURE — G8979 MOBILITY GOAL STATUS: HCPCS | Mod: CI

## 2018-03-12 PROCEDURE — 97162 PT EVAL MOD COMPLEX 30 MIN: CPT

## 2018-03-12 PROCEDURE — G8978 MOBILITY CURRENT STATUS: HCPCS | Mod: CJ

## 2018-03-12 ASSESSMENT — BALANCE ASSESSMENTS
TURN 360 DEGREES: 4
REACHING FORWARD WITH OUTSTRETCHED ARM WHILE STANDING: 3
PICK UP OBJECT FROM THE FLOOR FROM A STANDING POSITION: 4
STANDING ON ONE LEG: 1
TRANSFERS: 4
STANDING TO SITTING: 4
LONG VERSION TOTAL SCORE (MAX 56): 48
LONG VERSION TOTAL SCORE (MAX 56): 48
PLACE ALTERNATE FOOT ON STEP OR STOOL WHILE STANDING UNSUPPORTED: 3
STANDING UNSUPPORTED ONE FOOT IN FRONT: 2
STANDING UNSUPPORTED WITH EYES CLOSED: 4
SITTING UNSUPPORTED: 4
STANDING UNSUPPORTED: 4
STANDING UNSUPPORTED WITH FEET TOGETHER: 3
LOOK OVER LEFT AND RIGHT SHOULDERS WHILE STANDING: 4
SITTING TO STANDING: 4

## 2018-03-12 NOTE — OP THERAPY EVALUATION
Outpatient Physical Therapy  INITIAL EVALUATION    [facility]  [address]  [place] [address]  Phone:  [phone]  Fax:  [phone]    Date of Evaluation: 03/12/2018    Patient: Tania Aguilar  YOB: 1935  MRN: [id number]     Referring Provider: Ezequiel Grewal M.D.  [address]  [address]  [place] [address]   Referring Diagnosis Balance disorder [R26.89]     Time Calculation  Start time: 1000  Stop time: 1100 Time Calculation (min): 60 minutes     Physical Therapy Occurrence Codes    Date physical therapy care plan established or reviewed:  3/12/18   Date physical therapy treatment started:  3/12/18          Chief Complaint: Difficulty Walking    Visit Diagnoses     ICD-10-CM   1. Unstable balance R26.89         Subjective  Pt has been complaining of increasing loss of balance over the last few years when ambulating,reports several falls.Pt lives on her own with one step into house.She does not use assistive device but has single point cane  Pt also complains of R hand numbness and weakness for long time but worse recently  Past Medical History:   Diagnosis Date   • Allergic rhinitis    • Arthritis     Hands   • ASTHMA    • Atrial fibrillation (CMS-[facility]) January 2015    New onset. Echocardiogram with normal LV size, mild concentric LVH, LVEF 65-70%. Normal RA and LA. Mild MR, mild TR, RVSP 35-40mmHg. June 2015: GI bleed, Coumadin stopped/contraindicated.   • B12 deficiency    • Cancer (CMS-[facility])     Vulvar; skin; thyroid   • CHI (closed head injury)    • COPD (chronic obstructive pulmonary disease) (CMS-[facility])    • Degenerative joint disease    • Dental disorder     One broken tooth   • Depression    • GERD (gastroesophageal reflux disease)    • Hemorrhoids    • [place] (hard of hearing)      Bilaterally   • HSV-1 infection    • Hypertension    • Hypothyroidism    • MEDICAL HOME    • Meningitis    • Osteoporosis    • Shingles    •  Sinusitis due to Moraxella catarrhalis    • Stress incontinence    • Thyroid cancer (CMS-HCC)    • Vulvar cancer (CMS-HCC)      Past Surgical History:   Procedure Laterality Date   • GASTROSCOPY WITH BIOPSY N/A 5/7/2015    Procedure: GASTROSCOPY WITH BIOPSY;  Surgeon: Travis Jones M.D.;  Location: [facility];  Service:    • COLONOSCOPY WITH BIOPSY N/A 5/7/2015    Procedure: COLONOSCOPY WITH BIOPSY;  Surgeon: Travis Jones M.D.;  Location: SURGERY [facility];  Service:    • HYSTERECTOMY ROBOTIC  12/27/2013    Performed by Zoltan Salazar M.D. at SURGERY [facility]   • CYSTOSCOPY STENT PLACEMENT  12/27/2013    Performed by Zoltan Salazar M.D. at SURGERY [facility]   • OTHER       Removal Rt sinus osteoma   • OTHER SURGICAL PROCEDURE      ORx3 cancer removal (vulvar Ca.)   • THYROIDECTOMY       Due to thyroid cancern   • TONSILLECTOMY       Social History   Substance Use Topics   • Smoking status: Passive Smoke Exposure - Never Smoker   • Smokeless tobacco: Never Used   • Alcohol use 0.6 oz/week     1 Standard drinks or equivalent per week      Comment: rarely     Family and Occupational History     Social History   • Marital status:      Spouse name: N/A   • Number of children: N/A   • Years of education: N/A       Objective   Gait is unsteady with loss of balance  Muscle strength LE 5/5 all groups  Normal sensation  R hand decreased sensation all 5 digits  MP  strength R =10 L =22  O/A 2nd and 3rd digits bi lat   Exercises/Treatment  Time-based treatments/modalities:  Gait training minutes (CPT 16491): 15 minutesHEP for dynamic balance,wrist ROM and strengthening       Assessment, Response and Plan:   Prognosis: good    Goals:   Short Term Goals:   Improve gait to prevent falls  Improve Dynamic balance to prevent falls  Increase  strength to 15   Short term goal time span:  4-6 weeks      Long Term Goals:    Same as STG  Long term goal time span:  4-6  weeks    Plan:   Planned therapy interventions:  Gait Training (CPT 84650), Manual Therapy (CPT 69815), Therapeutic Exercise (CPT 23477) and Therapeutic Activities (CPT 41084)  Frequency:  2x week  Duration in weeks:  6  Duration in visits:  12    Pt understands HEP  Functional Limitation G-Codes and Severity Modifiers  PT Functional Assessment Tool Used: Cantrell  PT Functional Assessment Score: 48  Cantrell Balance Total Score (0-56): 48   Current: Mobility: Current (): CJ   Goal: Mobility: Goal (): CI     Referring provider co-signature:  I have reviewed this plan of care and my co-signature certifies the need for services.  Certification Dates:   From 03/12/18  To 04/30/18    Physician Signature: ________________________________ Date: ______________

## 2018-03-14 ENCOUNTER — PHYSICAL THERAPY (OUTPATIENT)
Dept: PHYSICAL THERAPY | Facility: MEDICAL CENTER | Age: 83
End: 2018-03-14
Attending: FAMILY MEDICINE
Payer: MEDICARE

## 2018-03-14 DIAGNOSIS — R26.89 UNSTABLE BALANCE: ICD-10-CM

## 2018-03-14 PROCEDURE — 97110 THERAPEUTIC EXERCISES: CPT

## 2018-03-14 PROCEDURE — 97140 MANUAL THERAPY 1/> REGIONS: CPT

## 2018-03-14 NOTE — OP THERAPY DAILY TREATMENT
Outpatient Physical Therapy  DAILY TREATMENT     [facility]  [address]  [place] [address]  Phone:  [phone]  Fax:  [phone]    Date: 03/14/2018    Patient: Tania Aguilar  YOB: 1935  MRN: [id number]     Time Calculation  Start time: 0100  Stop time: 0130 Time Calculation (min): 30 minutes     Chief Complaint: No chief complaint on file.    Visit #: 2    SUBJECTIVE:  No change in parathesia R fingers    OBJECTIVE:  Current objective measures:         Exercises/Treatment  Time-based treatments/modalities:  Manual therapy minutes (CPT 84284): 15 minutes  Therapeutic exercise minutes (CPT 56442): 15 minutes   Joint mobs R wrist   R hand strengthening exc  DN R carpel tunnel  Dynamic balance exc on bosu    Pain rating before treatment: 0  Pain rating after treatment: 0    ASSESSMENT:   Response to treatment:   Balance improving  PLAN/RECOMMENDATIONS:   Plan for treatment: therapy treatment to continue next visit.  Planned interventions for next visit: continue with current treatment.

## 2018-03-19 ENCOUNTER — PHYSICAL THERAPY (OUTPATIENT)
Dept: PHYSICAL THERAPY | Facility: MEDICAL CENTER | Age: 83
End: 2018-03-19
Attending: FAMILY MEDICINE
Payer: MEDICARE

## 2018-03-19 DIAGNOSIS — R26.89 UNSTABLE BALANCE: ICD-10-CM

## 2018-03-19 PROCEDURE — 97140 MANUAL THERAPY 1/> REGIONS: CPT

## 2018-03-19 PROCEDURE — 97110 THERAPEUTIC EXERCISES: CPT

## 2018-03-21 ENCOUNTER — PHYSICAL THERAPY (OUTPATIENT)
Dept: PHYSICAL THERAPY | Facility: MEDICAL CENTER | Age: 83
End: 2018-03-21
Attending: FAMILY MEDICINE
Payer: MEDICARE

## 2018-03-21 DIAGNOSIS — R26.89 UNSTABLE BALANCE: ICD-10-CM

## 2018-03-21 PROCEDURE — 97110 THERAPEUTIC EXERCISES: CPT

## 2018-03-21 PROCEDURE — 97140 MANUAL THERAPY 1/> REGIONS: CPT

## 2018-03-21 NOTE — OP THERAPY DAILY TREATMENT
Outpatient Physical Therapy  DAILY TREATMENT     [facility]  [address]  [place] [address]  Phone:  [phone]  Fax:  [phone]    Date: 03/21/2018    Patient: Tania Aguilar  YOB: 1935  MRN: [id number]     Time Calculation  Start time: 0100  Stop time: 0130 Time Calculation (min): 30 minutes     Chief Complaint: No chief complaint on file.    Visit #: 4    SUBJECTIVE:  Sensation in fingers feels much better    OBJECTIVE:  Current objective measures:         Exercises/Treatment  Time-based treatments/modalities:  Manual therapy minutes (CPT 33672): 15 minutes  Therapeutic exercise minutes (CPT 36252): 15 minutes   Joint mobs R wrist  STM  DN R carpel tunnel  Dynamic balance    Pain rating before treatment: 0  Pain rating after treatment: 0    ASSESSMENT:   Response to treatment:   Gait and balance improving    PLAN/RECOMMENDATIONS:   Plan for treatment: therapy treatment to continue next visit.  Planned interventions for next visit: continue with current treatment.

## 2018-03-26 ENCOUNTER — PHYSICAL THERAPY (OUTPATIENT)
Dept: PHYSICAL THERAPY | Facility: MEDICAL CENTER | Age: 83
End: 2018-03-26
Attending: FAMILY MEDICINE
Payer: MEDICARE

## 2018-03-26 DIAGNOSIS — R26.89 UNSTABLE BALANCE: ICD-10-CM

## 2018-03-26 PROCEDURE — 97140 MANUAL THERAPY 1/> REGIONS: CPT

## 2018-03-26 PROCEDURE — 97110 THERAPEUTIC EXERCISES: CPT

## 2018-03-26 RX ORDER — OMEPRAZOLE 20 MG/1
CAPSULE, DELAYED RELEASE ORAL
Qty: 90 CAP | Refills: 3 | Status: SHIPPED | OUTPATIENT
Start: 2018-03-26 | End: 2019-05-22 | Stop reason: SDUPTHER

## 2018-03-26 NOTE — OP THERAPY DAILY TREATMENT
Outpatient Physical Therapy  DAILY TREATMENT     [facility]  [address]  [place] [address]  Phone:  [phone]  Fax:  [phone]    Date: 03/26/2018    Patient: Tania Aguilar  YOB: 1935  MRN: [id number]     Time Calculation  Start time: 0100  Stop time: 0130 Time Calculation (min): 30 minutes     Chief Complaint: No chief complaint on file.    Visit #: 5    SUBJECTIVE:  Cont to do well    OBJECTIVE:  Current objective measures:     Pt now able to use zipper with R hand    Exercises/Treatment  Time-based treatments/modalities:  Manual therapy minutes (CPT 15431): 15 minutes  Therapeutic exercise minutes (CPT 32337): 15 minutes   Joint mobs R wrist  DN carpel tunnel  Dynamic balance exc  Nustep 5x5    Pain rating before treatment:0  Pain rating after treatment: 0    ASSESSMENT:   Response to treatment:   Pt feels she has about 45% use of R hand  One more treatment then D/C on home program    PLAN/RECOMMENDATIONS:   Plan for treatment: therapy treatment to continue next visit.  Planned interventions for next visit: continue with current treatment.

## 2018-03-28 ENCOUNTER — PHYSICAL THERAPY (OUTPATIENT)
Dept: PHYSICAL THERAPY | Facility: MEDICAL CENTER | Age: 83
End: 2018-03-28
Attending: FAMILY MEDICINE
Payer: MEDICARE

## 2018-03-28 DIAGNOSIS — R26.89 UNSTABLE BALANCE: ICD-10-CM

## 2018-03-28 PROCEDURE — 97140 MANUAL THERAPY 1/> REGIONS: CPT

## 2018-03-28 PROCEDURE — 97110 THERAPEUTIC EXERCISES: CPT

## 2018-03-28 NOTE — OP THERAPY DAILY TREATMENT
Outpatient Physical Therapy  DAILY TREATMENT     [facility]  [address]  [place] [address]  Phone:  [phone]  Fax:  [phone]    Date: 03/28/2018    Patient: Tania Aguilar  YOB: 1935  MRN: [id number]     Time Calculation  Start time: 0100  Stop time: 0130 Time Calculation (min): 30 minutes     Chief Complaint: No chief complaint on file.    Visit #: 6    SUBJECTIVE:  Hand is doing much better     OBJECTIVE:  Current objective measures:     20lbs  strength R    Exercises/Treatment  Time-based treatments/modalities:  Manual therapy minutes (CPT 01087): 15 minutes  Therapeutic exercise minutes (CPT 66332): 15 minutes   Joint mobs R wrist  DN R carpel tunnel  Dynamic balance exc  nustep 5x5    Pain rating before treatment: 0  Pain rating after treatment: 0    ASSESSMENT:   Response to treatment:   Balance is slowly improving,hand feels about 70% of normal  PLAN/RECOMMENDATIONS:   Plan for treatment: discharge patient due to accomplished goals.

## 2018-05-02 ENCOUNTER — TELEPHONE (OUTPATIENT)
Dept: PHYSICAL THERAPY | Facility: MEDICAL CENTER | Age: 83
End: 2018-05-02

## 2018-05-02 NOTE — OP THERAPY DISCHARGE SUMMARY
Outpatient Physical Therapy  DISCHARGE SUMMARY NOTE      [facility] Outpatient Physical Therapy  [address]  [place] [address]  Phone:  [phone]  Fax:  [phone]    Date of Visit: 05/02/2018    Patient: Tania Aguilar  YOB: 1935  MRN: [id number]     Referring Provider: No referring provider defined for this encounter.   Referring Diagnosis No admission diagnoses are documented for this encounter.     Physical Therapy Occurrence Codes    Date physical therapy care plan established or reviewed:  3/12/18   Date physical therapy treatment started:  3/12/18          Functional Limitation G-Codes and Severity Modifiers      Goal:     Discharge:         Your patient is being discharged from Physical Therapy with the following comments:   · Goals partially met  · Patient cancelled or missed more than 2 scheduled appointments/non-compliant    Comments:  Gait and dynamic balance slowly improving denies falls R  strength has dramatically improved     Limitations Remaining:      Recommendations:  D/C on HEP    Modesto Mishra, PT    Date: 5/2/2018

## 2018-06-13 RX ORDER — PAROXETINE HYDROCHLORIDE 20 MG/1
TABLET, FILM COATED ORAL
Qty: 90 TAB | Refills: 3 | Status: SHIPPED | OUTPATIENT
Start: 2018-06-13 | End: 2019-05-22 | Stop reason: SDUPTHER

## 2019-01-23 ENCOUNTER — PATIENT OUTREACH (OUTPATIENT)
Dept: HEALTH INFORMATION MANAGEMENT | Facility: OTHER | Age: 84
End: 2019-01-23

## 2019-01-23 NOTE — PROGRESS NOTES
Outcome: Left Message    Please transfer to Patient Outreach Team at [phone] when patient returns call.    WebIZ Checked & Epic Updated:  yes    HealthConnect Verified: yes    Attempt # 1

## 2019-01-27 ENCOUNTER — HOSPITAL ENCOUNTER (EMERGENCY)
Facility: MEDICAL CENTER | Age: 84
End: 2019-01-27
Attending: EMERGENCY MEDICINE
Payer: MEDICARE

## 2019-01-27 ENCOUNTER — APPOINTMENT (OUTPATIENT)
Dept: RADIOLOGY | Facility: MEDICAL CENTER | Age: 84
End: 2019-01-27
Attending: EMERGENCY MEDICINE
Payer: MEDICARE

## 2019-01-27 VITALS
OXYGEN SATURATION: 94 % | RESPIRATION RATE: 16 BRPM | HEART RATE: 82 BPM | SYSTOLIC BLOOD PRESSURE: 178 MMHG | DIASTOLIC BLOOD PRESSURE: 91 MMHG | WEIGHT: 125.22 LBS | HEIGHT: 60 IN | TEMPERATURE: 98 F | BODY MASS INDEX: 24.58 KG/M2

## 2019-01-27 DIAGNOSIS — S09.90XA CLOSED HEAD INJURY, INITIAL ENCOUNTER: ICD-10-CM

## 2019-01-27 DIAGNOSIS — S63.502A SPRAIN OF LEFT WRIST, INITIAL ENCOUNTER: ICD-10-CM

## 2019-01-27 PROCEDURE — 99284 EMERGENCY DEPT VISIT MOD MDM: CPT

## 2019-01-27 PROCEDURE — 70450 CT HEAD/BRAIN W/O DYE: CPT

## 2019-01-27 PROCEDURE — 73110 X-RAY EXAM OF WRIST: CPT | Mod: LT

## 2019-01-27 RX ORDER — CYANOCOBALAMIN 1000 UG/ML
1000 INJECTION, SOLUTION INTRAMUSCULAR; SUBCUTANEOUS
Status: SHIPPED | COMMUNITY
End: 2020-03-04 | Stop reason: SDUPTHER

## 2019-01-27 NOTE — ED NOTES
Pt presents to the Er with c/o fall. Denies any loc or use of blood thinners. Pt has a lump to back of head that is tender to the touch. Pt also has left wrist pain. No obvious deformities at this time.

## 2019-01-27 NOTE — ED PROVIDER NOTES
ED Provider Note    CHIEF COMPLAINT  Chief Complaint   Patient presents with   • T-5000 FALL   • Head Injury   • Wrist Injury     Left wrist       HPI  Tania Aguilar is a 83 y.o. female who presents to the emergency department complaining of a ground-level fall.  The patient was at [employer] walking up some stairs and lost her balance and fell backwards.  She hit her head.  She is a bump in the back of her head did not get knocked out.  No neck or back pain.  No chest or abdominal pain.  Also injured her left wrist.  No numbness or tingling weakness anywhere else.  No other acute injuries or complaints.  Is not taking anticoagulation.  No lacerations.    REVIEW OF SYSTEMS  See HPI for further details. All other systems are negative.    PAST MEDICAL HISTORY  Past Medical History:   Diagnosis Date   • Allergic rhinitis    • Arthritis     Hands   • ASTHMA    • Atrial fibrillation (HCC) January 2015    New onset. Echocardiogram with normal LV size, mild concentric LVH, LVEF 65-70%. Normal RA and LA. Mild MR, mild TR, RVSP 35-40mmHg. June 2015: GI bleed, Coumadin stopped/contraindicated.   • B12 deficiency    • Cancer (HCC)     Vulvar; skin; thyroid   • CHI (closed head injury)    • COPD (chronic obstructive pulmonary disease) (HCC)    • Degenerative joint disease    • Dental disorder     One broken tooth   • Depression    • GERD (gastroesophageal reflux disease)    • Hemorrhoids    • [place] (hard of hearing)      Bilaterally   • HSV-1 infection    • Hypertension    • Hypothyroidism    • MEDICAL HOME    • Meningitis    • Osteoporosis    • Shingles    • Sinusitis due to Moraxella catarrhalis    • Stress incontinence    • Thyroid cancer (HCC)    • Vulvar cancer (HCC)        FAMILY HISTORY  Family History   Problem Relation Age of Onset   • Heart Disease Mother         [email or web address]   • Cancer Father         throat Cancer   • Heart Disease Sister    • Cancer Brother         brain tumor   • Cancer Sister         breast   • Cancer  Sister         lung   • Other Sister         myeloma       SOCIAL HISTORY  Social History     Social History   • Marital status:      Spouse name: N/A   • Number of children: N/A   • Years of education: N/A     Social History Main Topics   • Smoking status: Passive Smoke Exposure - Never Smoker   • Smokeless tobacco: Never Used   • Alcohol use 0.6 oz/week     1 Standard drinks or equivalent per week      Comment: rarely   • Drug use: No   • Sexual activity: Not on file     Other Topics Concern   • Not on file     Social History Narrative   • No narrative on file       SURGICAL HISTORY  Past Surgical History:   Procedure Laterality Date   • GASTROSCOPY WITH BIOPSY N/A 5/7/2015    Procedure: GASTROSCOPY WITH BIOPSY;  Surgeon: Travis Jones M.D.;  Location: [facility];  Service:    • COLONOSCOPY WITH BIOPSY N/A 5/7/2015    Procedure: COLONOSCOPY WITH BIOPSY;  Surgeon: Travis Jones M.D.;  Location: [facility];  Service:    • HYSTERECTOMY ROBOTIC  12/27/2013    Performed by Zoltan Salazar M.D. at SURGERY [facility]   • CYSTOSCOPY STENT PLACEMENT  12/27/2013    Performed by Zoltan Salazar M.D. at [facility]   • OTHER       Removal Rt sinus osteoma   • OTHER SURGICAL PROCEDURE      ORx3 cancer removal (vulvar Ca.)   • THYROIDECTOMY       Due to thyroid cancern   • TONSILLECTOMY         CURRENT MEDICATIONS  Home Medications    **Home medications have not yet been reviewed for this encounter**         ALLERGIES  Allergies   Allergen Reactions   • Ace Inhibitors      Cough     • Albuterol      Whole body spasmed  Patient tolerates Formoterol (Symbicort) - takes at home       PHYSICAL EXAM  VITAL SIGNS: BP (!) 192/97   Pulse 100   Temp 36.7 °C (98.1 °F) (Temporal)   Resp 17   Ht 1.524 m (5')   Wt 56.8 kg (125 lb 3.5 oz)   BMI 24.46 kg/m²    Constitutional: Well developed, Well nourished, No acute distress, Non-toxic appearance.   HENT: Normocephalic, contusion  abrasion the posterior aspect of the scalp., Bilateral external ears normal, Oropharynx moist, No oral exudates, Nose normal.   Eyes: PERRL, EOMI, Conjunctiva normal, No discharge.   Neck: Normal range of motion, No tenderness, Supple,   Cardiovascular: Normal heart rate, Normal rhythm, No murmurs, No rubs, No gallops.   Thorax & Lungs: Normal breath sounds, No respiratory distress, No wheezing  Abdomen: Bowel sounds normal, Soft, No tenderness,  Skin: Warm, Dry, No erythema, No rash.     Musculoskeletal: Good range of motion in all major joints.  Slight tenderness of the left distal wrist and navicular bone.  No significant swelling.  Normal neurovascular exam.  Neurologic: Alert,  No focal deficits noted.   Psychiatric: Affect normal,          RADIOLOGY/PROCEDURES  CT-HEAD W/O   Final Result      Left parietal scalp hematoma without evidence of skull fracture or intracranial hemorrhage.      DX-WRIST-COMPLETE 3+ LEFT   Final Result      1.  There is no acute fracture or malalignment of the left wrist.   2.  There is multifocal degenerative change as noted above.            COURSE & MEDICAL DECISION MAKING  Pertinent Labs & Imaging studies reviewed. (See chart for details)      The patient had a mechanical ground-level fall falling backwards and hitting her head.  She did not get knocked out.  She is a fairly large bump on the back of her head because of her age, and comorbidities I felt a head CT was indicated.  This was fortunately negative.    From this perspective the patient will be discharged home with close head injury precautions.    Neck is clinically cleared.  No evidence of neck fracture.  No signs of thoracic or spinal fracture or abdominal injury.    She is some tenderness in her left wrist but no real significant swelling.  This is minimal.  X-ray of the left wrist was obtained and is negative.  She is normal neurovascular exam.    The patient is put in a Velcro splint.    The patient is referred back  to her primary care doctor she will return for pain in the wrist, headache, or other concerns.  She is referred to her doctor.  Questions are answered, she is agreeable with the plan.    We will repeat her blood pressure and vital signs.  Have her follow-up with her doctor for high blood pressure.        The patient was noted to have elevated blood pressure while in the ER and was counseled to see their doctor within one wee to have this rechecked.    Ezequiel Grewal M.D.  [id number]  [id number]  [facility] [address]  [phone]    Schedule an appointment as soon as possible for a visit in 2 days          FINAL IMPRESSION  1. Closed head injury, initial encounter    2. Sprain of left wrist, initial encounter        2.   3.         Electronically signed by: Estiven Nuñez, 1/27/2019 11:38 AM

## 2019-01-27 NOTE — ED NOTES
Wrist brace by tech  Discharged to home with instructions to follow up with her doctor, ice, and elevation.  Tylenol for pain

## 2019-01-27 NOTE — DISCHARGE INSTRUCTIONS
Use the wrist brace for comfort.  Return to emerge part for pain swelling numbness or other concerns.  Your head CT was negative.  Follow-up with your doctor return for worsening headache.

## 2019-01-27 NOTE — ED TRIAGE NOTES
Chief Complaint   Patient presents with   • T-5000 FALL   • Head Injury   • Wrist Injury     Left wrist     Denies any LOC.  BP (!) 192/97   Pulse 100   Temp 36.7 °C (98.1 °F) (Temporal)   Resp 17   Ht 1.524 m (5')   Wt 56.8 kg (125 lb 3.5 oz)   BMI 24.46 kg/m²

## 2019-01-29 NOTE — PROGRESS NOTES
Outcome: pt states she wants to put the AWV off for a while as she just had a bad fall and needs to recover first.    Please transfer to Patient Outreach Team at [phone] when patient returns call.    WebIZ Checked & Epic Updated:  yes    HealthConnect Verified: yes    Attempt # 2

## 2019-01-31 ENCOUNTER — TELEPHONE (OUTPATIENT)
Dept: MEDICAL GROUP | Facility: MEDICAL CENTER | Age: 84
End: 2019-01-31

## 2019-01-31 NOTE — TELEPHONE ENCOUNTER
Left message for patient to call back regarding pre-visit planning. Please transfer call to [phone].

## 2019-01-31 NOTE — TELEPHONE ENCOUNTER
ESTABLISHED PATIENT PRE-VISIT PLANNING     Patient was NOT contacted to complete PVP.     Note: Patient will not be contacted if there is no indication to call.     1.  Reviewed notes from the last few office visits within the medical group: Yes    2.  If any orders were placed at last visit or intended to be done for this visit (i.e. 6 mos follow-up), do we have Results/Consult Notes?        •  Labs - Labs ordered, completed on 2/14/18 and results are in chart.   Note: If patient appointment is for lab review and patient did not complete labs, check with provider if OK to reschedule patient until labs completed.       •  Imaging - Imaging was not ordered at last office visit.       •  Referrals - Referral ordered, patient has NOT been seen.    3. Is this appointment scheduled as a Hospital Follow-Up? Yes, visit was at [facility].     4.  Immunizations were updated in Epic using WebIZ?: Epic matches WebIZ       •  Web Iz Recommendations: FLU, TDAP and SHINGRIX (Shingles)    5.  Patient is due for the following Health Maintenance Topics:   Health Maintenance Due   Topic Date Due   • IMM DTaP/Tdap/Td Vaccine (1 - Tdap) 02/09/1954   • IMM ZOSTER VACCINES (1 of 2) 02/09/1986   • PFT SCREENING-FEV1 AND FEV/FVC RATIO / SPIROMETRY SHOULD BE PERFORMED ANNUALLY  05/17/2017   • IMM INFLUENZA (1) 09/01/2018     6. Orders for overdue Health Maintenance topics pended in Pre-Charting? NO    7.  AHA (MDX) form printed for Provider? YES    8.  Patient was NOT informed to arrive 15 min prior to their scheduled appointment and bring in their medication bottles.

## 2019-02-01 ENCOUNTER — OFFICE VISIT (OUTPATIENT)
Dept: MEDICAL GROUP | Facility: MEDICAL CENTER | Age: 84
End: 2019-02-01
Payer: MEDICARE

## 2019-02-01 VITALS
HEIGHT: 60 IN | OXYGEN SATURATION: 96 % | TEMPERATURE: 97.7 F | SYSTOLIC BLOOD PRESSURE: 142 MMHG | DIASTOLIC BLOOD PRESSURE: 76 MMHG | WEIGHT: 125 LBS | BODY MASS INDEX: 24.54 KG/M2 | HEART RATE: 81 BPM

## 2019-02-01 DIAGNOSIS — E53.8 B12 DEFICIENCY: ICD-10-CM

## 2019-02-01 DIAGNOSIS — R26.89 POOR BALANCE: ICD-10-CM

## 2019-02-01 DIAGNOSIS — R73.01 ELEVATED FASTING BLOOD SUGAR: ICD-10-CM

## 2019-02-01 DIAGNOSIS — I48.0 PAROXYSMAL ATRIAL FIBRILLATION (HCC): ICD-10-CM

## 2019-02-01 DIAGNOSIS — E03.9 HYPOTHYROIDISM, UNSPECIFIED TYPE: ICD-10-CM

## 2019-02-01 PROCEDURE — 99214 OFFICE O/P EST MOD 30 MIN: CPT | Performed by: FAMILY MEDICINE

## 2019-02-01 RX ORDER — CETIRIZINE HYDROCHLORIDE 10 MG/1
10 TABLET ORAL DAILY
COMMUNITY
End: 2019-07-15

## 2019-02-01 NOTE — ASSESSMENT & PLAN NOTE
Patient being followed by endocrinology.  She is currently on levothyroxine 200 mcg daily.  She is requesting labs to be ordered ahead of her appointment in the next 1-2 months.

## 2019-02-01 NOTE — PROGRESS NOTES
Subjective:   Tania Aguilar is a 83 y.o. female here today for poor balance and hypothyroidism    Hypothyroidism  Patient being followed by endocrinology.  She is currently on levothyroxine 200 mcg daily.  She is requesting labs to be ordered ahead of her appointment in the next 1-2 months.    Poor balance  On Sunday patient was walking down steps when she suddenly fell back and hit the back of her head.  She is not on anticoagulation because of history of bleeding.  Since that time she has started using a cane regularly.  At the time of the fall she was not using a cane.  She denies any dizziness, blurry vision, significant headache, nausea or vomiting.         Current medicines (including changes today)  Current Outpatient Prescriptions   Medication Sig Dispense Refill   • cetirizine (KLS ALLER-JERAMY) 10 MG Tab Take 10 mg by mouth every day.     • cyanocobalamin (VITAMIN B-12) 1000 MCG/ML Solution 1,000 mcg by Intramuscular route every 7 days. On Wed     • PARoxetine (PAXIL) 20 MG Tab TAKE ONE TABLET BY MOUTH ONCE DAILY WITH FOOD 90 Tab 3   • omeprazole (PRILOSEC) 20 MG delayed-release capsule TAKE ONE CAPSULE BY MOUTH ONCE DAILY 90 Cap 3   • oxybutynin SR (DITROPAN-XL) 5 MG TABLET SR 24 HR Take 1 Tab by mouth every day. 90 Tab 3   • levothyroxine (SYNTHROID) 200 MCG Tab Take 1 Tab by mouth Every morning on an empty stomach.     • Fexofenadine HCl (MUCINEX ALLERGY PO) Take 1 Tab by mouth every bedtime.     • cetirizine (ZYRTEC) 10 MG Tab Take 1 Tab by mouth every day.       No current facility-administered medications for this visit.      She  has a past medical history of Allergic rhinitis; Arthritis; ASTHMA; Atrial fibrillation (HCC) (January 2015); B12 deficiency; Cancer (HCC); CHI (closed head injury); COPD (chronic obstructive pulmonary disease) (HCC); Degenerative joint disease; Dental disorder; Depression; GERD (gastroesophageal reflux disease); Hemorrhoids; [place] (hard of hearing) ( ); HSV-1 infection;  Hypertension; Hypothyroidism; MEDICAL HOME; Meningitis; Osteoporosis; Shingles; Sinusitis due to Moraxella catarrhalis; Stress incontinence; Thyroid cancer (HCC); and Vulvar cancer (HCC). She also has no past medical history of Breast cancer (HCC).    ROS   No chest pain, no shortness of breath       Objective:     Blood pressure 142/76, pulse 81, temperature 36.5 °C (97.7 °F), height 1.524 m (5'), weight 56.7 kg (125 lb), SpO2 96 %, not currently breastfeeding. Body mass index is 24.41 kg/m².   Physical Exam:  Constitutional: Alert, no distress.  Skin: Warm, dry, good turgor, no rashes in visible areas.  Eye: Equal, round and reactive, conjunctiva clear, lids normal.  ENMT: Lips without lesions, good dentition, oropharynx clear.  Psych: Alert and oriented x3, normal affect and mood.  Head: No obvious ecchymoses or hematoma present on exam, no ulceration or lacerations.      Assessment and Plan:   The following treatment plan was discussed    1. Poor balance  New problem.  Encourage patient to continue with cane.  I also encourage patient to walk 15-20 minutes daily.    2. Hypothyroidism, unspecified type  Labs ordered.  Advised patient to follow-up with endocrinology as scheduled.  - Lipid Profile; Future  - TSH; Future  - FREE THYROXINE; Future  - T3 FREE; Future    3. Paroxysmal atrial fibrillation (HCC)  Check labs and follow-up for annual.  - CBC WITH DIFFERENTIAL; Future  - COMP METABOLIC PANEL; Future  - Lipid Profile; Future    4. B12 deficiency  Check labs and follow-up for annual.  - VITAMIN B12; Future    5. Elevated fasting blood sugar  Check labs and follow-up for annual.  - HEMOGLOBIN A1C; Future      Followup: Return in about 3 months (around 5/1/2019), or if symptoms worsen or fail to improve, for Annual.

## 2019-02-01 NOTE — ASSESSMENT & PLAN NOTE
On Sunday patient was walking down steps when she suddenly fell back and hit the back of her head.  She is not on anticoagulation because of history of bleeding.  Since that time she has started using a cane regularly.  At the time of the fall she was not using a cane.  She denies any dizziness, blurry vision, significant headache, nausea or vomiting.

## 2019-05-01 ENCOUNTER — HOSPITAL ENCOUNTER (OUTPATIENT)
Dept: LAB | Facility: MEDICAL CENTER | Age: 84
End: 2019-05-01
Attending: SPECIALIST
Payer: MEDICARE

## 2019-05-01 PROCEDURE — 87624 HPV HI-RISK TYP POOLED RSLT: CPT

## 2019-05-01 PROCEDURE — 88175 CYTOPATH C/V AUTO FLUID REDO: CPT

## 2019-05-05 LAB
CYTOLOGY REG CYTOL: NORMAL
HPV HR 12 DNA CVX QL NAA+PROBE: NEGATIVE
HPV16 DNA SPEC QL NAA+PROBE: NEGATIVE
HPV18 DNA SPEC QL NAA+PROBE: NEGATIVE
SPECIMEN SOURCE: NORMAL

## 2019-05-14 ENCOUNTER — HOSPITAL ENCOUNTER (OUTPATIENT)
Dept: LAB | Facility: MEDICAL CENTER | Age: 84
End: 2019-05-14
Attending: FAMILY MEDICINE
Payer: MEDICARE

## 2019-05-14 DIAGNOSIS — R73.01 ELEVATED FASTING BLOOD SUGAR: ICD-10-CM

## 2019-05-14 DIAGNOSIS — E03.9 HYPOTHYROIDISM, UNSPECIFIED TYPE: ICD-10-CM

## 2019-05-14 DIAGNOSIS — E53.8 B12 DEFICIENCY: ICD-10-CM

## 2019-05-14 DIAGNOSIS — I48.0 PAROXYSMAL ATRIAL FIBRILLATION (HCC): ICD-10-CM

## 2019-05-14 LAB
ALBUMIN SERPL BCP-MCNC: 3.9 G/DL (ref 3.2–4.9)
ALBUMIN/GLOB SERPL: 1.3 G/DL
ALP SERPL-CCNC: 65 U/L (ref 30–99)
ALT SERPL-CCNC: 15 U/L (ref 2–50)
ANION GAP SERPL CALC-SCNC: 4 MMOL/L (ref 0–11.9)
AST SERPL-CCNC: 12 U/L (ref 12–45)
BASOPHILS # BLD AUTO: 0.6 % (ref 0–1.8)
BASOPHILS # BLD: 0.03 K/UL (ref 0–0.12)
BILIRUB SERPL-MCNC: 0.5 MG/DL (ref 0.1–1.5)
BUN SERPL-MCNC: 12 MG/DL (ref 8–22)
CALCIUM SERPL-MCNC: 9.5 MG/DL (ref 8.5–10.5)
CHLORIDE SERPL-SCNC: 106 MMOL/L (ref 96–112)
CHOLEST SERPL-MCNC: 246 MG/DL (ref 100–199)
CO2 SERPL-SCNC: 27 MMOL/L (ref 20–33)
CREAT SERPL-MCNC: 0.71 MG/DL (ref 0.5–1.4)
EOSINOPHIL # BLD AUTO: 0.15 K/UL (ref 0–0.51)
EOSINOPHIL NFR BLD: 2.9 % (ref 0–6.9)
ERYTHROCYTE [DISTWIDTH] IN BLOOD BY AUTOMATED COUNT: 40.1 FL (ref 35.9–50)
EST. AVERAGE GLUCOSE BLD GHB EST-MCNC: 120 MG/DL
FASTING STATUS PATIENT QL REPORTED: NORMAL
GLOBULIN SER CALC-MCNC: 3.1 G/DL (ref 1.9–3.5)
GLUCOSE SERPL-MCNC: 105 MG/DL (ref 65–99)
HBA1C MFR BLD: 5.8 % (ref 0–5.6)
HCT VFR BLD AUTO: 42.6 % (ref 37–47)
HDLC SERPL-MCNC: 43 MG/DL
HGB BLD-MCNC: 14.3 G/DL (ref 12–16)
IMM GRANULOCYTES # BLD AUTO: 0.01 K/UL (ref 0–0.11)
IMM GRANULOCYTES NFR BLD AUTO: 0.2 % (ref 0–0.9)
LDLC SERPL CALC-MCNC: 157 MG/DL
LYMPHOCYTES # BLD AUTO: 2.15 K/UL (ref 1–4.8)
LYMPHOCYTES NFR BLD: 41.1 % (ref 22–41)
MCH RBC QN AUTO: 29.4 PG (ref 27–33)
MCHC RBC AUTO-ENTMCNC: 33.6 G/DL (ref 33.6–35)
MCV RBC AUTO: 87.7 FL (ref 81.4–97.8)
MONOCYTES # BLD AUTO: 0.34 K/UL (ref 0–0.85)
MONOCYTES NFR BLD AUTO: 6.5 % (ref 0–13.4)
NEUTROPHILS # BLD AUTO: 2.55 K/UL (ref 2–7.15)
NEUTROPHILS NFR BLD: 48.7 % (ref 44–72)
NRBC # BLD AUTO: 0 K/UL
NRBC BLD-RTO: 0 /100 WBC
PLATELET # BLD AUTO: 341 K/UL (ref 164–446)
PMV BLD AUTO: 10.4 FL (ref 9–12.9)
POTASSIUM SERPL-SCNC: 4.2 MMOL/L (ref 3.6–5.5)
PROT SERPL-MCNC: 7 G/DL (ref 6–8.2)
RBC # BLD AUTO: 4.86 M/UL (ref 4.2–5.4)
SODIUM SERPL-SCNC: 137 MMOL/L (ref 135–145)
T3FREE SERPL-MCNC: 3.99 PG/ML (ref 2.4–4.2)
T4 FREE SERPL-MCNC: 1.68 NG/DL (ref 0.53–1.43)
TRIGL SERPL-MCNC: 228 MG/DL (ref 0–149)
TSH SERPL DL<=0.005 MIU/L-ACNC: <0.005 UIU/ML (ref 0.38–5.33)
VIT B12 SERPL-MCNC: 1177 PG/ML (ref 211–911)
WBC # BLD AUTO: 5.2 K/UL (ref 4.8–10.8)

## 2019-05-14 PROCEDURE — 84439 ASSAY OF FREE THYROXINE: CPT

## 2019-05-14 PROCEDURE — 36415 COLL VENOUS BLD VENIPUNCTURE: CPT

## 2019-05-14 PROCEDURE — 85025 COMPLETE CBC W/AUTO DIFF WBC: CPT

## 2019-05-14 PROCEDURE — 84481 FREE ASSAY (FT-3): CPT

## 2019-05-14 PROCEDURE — 80061 LIPID PANEL: CPT

## 2019-05-14 PROCEDURE — 80053 COMPREHEN METABOLIC PANEL: CPT

## 2019-05-14 PROCEDURE — 84443 ASSAY THYROID STIM HORMONE: CPT

## 2019-05-14 PROCEDURE — 83036 HEMOGLOBIN GLYCOSYLATED A1C: CPT

## 2019-05-14 PROCEDURE — 82607 VITAMIN B-12: CPT

## 2019-05-15 ENCOUNTER — TELEPHONE (OUTPATIENT)
Dept: MEDICAL GROUP | Facility: MEDICAL CENTER | Age: 84
End: 2019-05-15

## 2019-05-15 NOTE — TELEPHONE ENCOUNTER
Future Appointments       Provider Department Center    5/22/2019 9:20 AM Ezequiel Gerwal M.D.; [facility]          ANNUAL WELLNESS VISIT PRE-VISIT PLANNING WITHOUT OUTREACH    1.  Reviewed note from last office visit with PCP: YES    2.  If any orders were placed at last visit, do we have Results/Consult Notes?        •  Labs - Labs ordered, completed on 5/14/19 and results are in chart.  Note: If patient appointment is for lab review and patient did not complete labs, check with provider if OK to reschedule patient until labs completed.       •  Imaging - Imaging was not ordered at last office visit.       •  Referrals - No referrals were ordered at last office visit.    3.  Immunizations were updated in Epic using WebIZ?: Epic matches WebIZ       •  WebIZ Recommendations: FLU, TDAP, SHINGRIX (Shingles) and CPOX        •  Is patient due for Tdap? YES. Patient was not notified of copay/out of pocket cost.       •  Is patient due for Shingrix? YES. Patient was not notified of copay/out of pocket cost.     4.  Patient is due for the following Health Maintenance Topics:   Health Maintenance Due   Topic Date Due   • IMM DTaP/Tdap/Td Vaccine (1 - Tdap) 02/09/1954   • IMM ZOSTER VACCINES (1 of 2) 02/09/1986   • PFT SCREENING-FEV1 AND FEV/FVC RATIO / SPIROMETRY SHOULD BE PERFORMED ANNUALLY  05/17/2017   • Annual Wellness Visit  02/14/2019     5.  Reviewed/Updated the following with patient:       •   Preferred Pharmacy? NO       •   Preferred Lab? NO       •   Preferred Communication? NO       •   Allergies? NO       •   Medications? NO       •   Social History? NO       •   Family History (document living status of immediate family members and if + hx of  cancer, diabetes, hypertension, hyperlipidemia, heart attack, stroke) NO    6.  Care Team Updated:       •   DME Company (gait device, O2, CPAP, etc.): NO       •   Other Specialists (eye doctor, derm, GYN,  cardiology, endo, etc): NO    7. Orders for overdue Health Maintenance topics pended in Pre-Charting? NO    8.  Patient has the following Care Path diagnoses on Problem List:  DEPRESSION - Unknown    Is patient seeing a counselor, psychiatrist or other healthcare provider regarding their mental health?     Depression Screen (PHQ-2/PHQ-9) 5/17/2016 2/13/2018 2/21/2018   PHQ-2 Total Score - - -   PHQ-2 Total Score 0 0 0       Interpretation of PHQ-9 Total Score   Score Severity   1-4 No Depression   5-9 Mild Depression   10-14 Moderate Depression   15-19 Moderately Severe Depression   20-27 Severe Depression    COPD- Unknown    9.  Patient was not advised: “This is a free wellness visit. The provider will screen for medical conditions to help you stay healthy. If you have other concerns to address you may be asked to discuss these at a separate visit or there may be an additional fee.”     10.  Patient was NOT informed to arrive 15 min prior to their scheduled appointment and bring in their medication bottles.

## 2019-05-21 NOTE — TELEPHONE ENCOUNTER
Left message for patient to call back regarding pre-visit planning. Please transfer call to [phone].

## 2019-05-22 ENCOUNTER — OFFICE VISIT (OUTPATIENT)
Dept: MEDICAL GROUP | Facility: MEDICAL CENTER | Age: 84
End: 2019-05-22
Payer: MEDICARE

## 2019-05-22 VITALS
WEIGHT: 120 LBS | TEMPERATURE: 97.7 F | HEART RATE: 79 BPM | DIASTOLIC BLOOD PRESSURE: 70 MMHG | SYSTOLIC BLOOD PRESSURE: 126 MMHG | OXYGEN SATURATION: 95 % | HEIGHT: 60 IN | BODY MASS INDEX: 23.56 KG/M2

## 2019-05-22 DIAGNOSIS — E03.9 HYPOTHYROIDISM, UNSPECIFIED TYPE: ICD-10-CM

## 2019-05-22 DIAGNOSIS — F33.1 MDD (MAJOR DEPRESSIVE DISORDER), RECURRENT EPISODE, MODERATE (HCC): ICD-10-CM

## 2019-05-22 DIAGNOSIS — I48.0 PAROXYSMAL ATRIAL FIBRILLATION (HCC): ICD-10-CM

## 2019-05-22 DIAGNOSIS — Z00.00 MEDICARE ANNUAL WELLNESS VISIT, SUBSEQUENT: ICD-10-CM

## 2019-05-22 DIAGNOSIS — R26.89 POOR BALANCE: ICD-10-CM

## 2019-05-22 DIAGNOSIS — E53.8 B12 DEFICIENCY: ICD-10-CM

## 2019-05-22 DIAGNOSIS — K21.9 GASTROESOPHAGEAL REFLUX DISEASE WITHOUT ESOPHAGITIS: ICD-10-CM

## 2019-05-22 DIAGNOSIS — J44.9 CHRONIC OBSTRUCTIVE PULMONARY DISEASE, UNSPECIFIED COPD TYPE (HCC): ICD-10-CM

## 2019-05-22 DIAGNOSIS — M81.0 AGE-RELATED OSTEOPOROSIS WITHOUT CURRENT PATHOLOGICAL FRACTURE: ICD-10-CM

## 2019-05-22 DIAGNOSIS — N39.46 MIXED INCONTINENCE URGE AND STRESS: ICD-10-CM

## 2019-05-22 PROCEDURE — G0439 PPPS, SUBSEQ VISIT: HCPCS | Performed by: FAMILY MEDICINE

## 2019-05-22 RX ORDER — OMEPRAZOLE 20 MG/1
20 CAPSULE, DELAYED RELEASE ORAL DAILY
Qty: 90 CAP | Refills: 3 | Status: SHIPPED | OUTPATIENT
Start: 2019-05-22 | End: 2019-08-12

## 2019-05-22 RX ORDER — OXYBUTYNIN CHLORIDE 5 MG/1
5 TABLET, EXTENDED RELEASE ORAL DAILY
Qty: 90 TAB | Refills: 3 | Status: SHIPPED | OUTPATIENT
Start: 2019-05-22 | End: 2020-07-08

## 2019-05-22 RX ORDER — PAROXETINE HYDROCHLORIDE 20 MG/1
20 TABLET, FILM COATED ORAL DAILY
Qty: 90 TAB | Refills: 3 | Status: SHIPPED | OUTPATIENT
Start: 2019-05-22 | End: 2019-08-12

## 2019-05-22 ASSESSMENT — PATIENT HEALTH QUESTIONNAIRE - PHQ9
5. POOR APPETITE OR OVEREATING: 0 - NOT AT ALL
SUM OF ALL RESPONSES TO PHQ QUESTIONS 1-9: 3
CLINICAL INTERPRETATION OF PHQ2 SCORE: 1

## 2019-05-22 ASSESSMENT — ACTIVITIES OF DAILY LIVING (ADL): BATHING_REQUIRES_ASSISTANCE: 0

## 2019-05-22 ASSESSMENT — ENCOUNTER SYMPTOMS: GENERAL WELL-BEING: FAIR

## 2019-05-22 NOTE — PROGRESS NOTES
Chief Complaint   Patient presents with   • Annual Wellness Visit         HPI:  Tania is a 84 y.o. here for Medicare Annual Wellness Visit        Patient Active Problem List    Diagnosis Date Noted   • Atrial fibrillation (HCC) 05/01/2015     Priority: High   • Essential hypertension, benign 08/19/2009     Priority: High   • Hard of hearing 05/03/2011     Priority: Medium   • Asthma, mild intermittent, well-controlled      Priority: Medium   • GERD (gastroesophageal reflux disease)      Priority: Medium   • Heart murmur      Priority: Medium   • COPD (chronic obstructive pulmonary disease) (formerly [facility])      Priority: Medium   • B12 deficiency 05/03/2011     Priority: Low   • Mixed incontinence urge and stress      Priority: Low   • MDD (major depressive disorder), recurrent episode, moderate (formerly [facility])      Priority: Low   • DJD (degenerative joint disease)      Priority: Low   • History of vulvar dysplasia      Priority: Low   • Hypothyroidism 08/19/2009     Priority: Low   • Poor balance 02/01/2019   • Mild intermittent asthma without complication 07/19/2017   • Osteoporosis 09/14/2016   • Seasonal allergies 09/14/2016   • Prediabetes 09/14/2016   • SEA on CPAP 07/07/2016       Current Outpatient Prescriptions   Medication Sig Dispense Refill   • PARoxetine (PAXIL) 20 MG Tab Take 1 Tab by mouth every day. 90 Tab 3   • omeprazole (PRILOSEC) 20 MG delayed-release capsule Take 1 Cap by mouth every day. 90 Cap 3   • oxybutynin SR (DITROPAN-XL) 5 MG TABLET SR 24 HR Take 1 Tab by mouth every day. 90 Tab 3   • cyanocobalamin (VITAMIN B-12) 1000 MCG/ML Solution 1,000 mcg by Intramuscular route every 7 days. On Wed     • levothyroxine (SYNTHROID) 200 MCG Tab Take 1 Tab by mouth Every morning on an empty stomach.     • cetirizine (KLS ALLER-JERAMY) 10 MG Tab Take 10 mg by mouth every day.     • cetirizine (ZYRTEC) 10 MG Tab Take 1 Tab by mouth every day.     • Fexofenadine HCl (MUCINEX ALLERGY PO) Take 1 Tab by mouth every bedtime.        No current facility-administered medications for this visit.         Patient is taking medications as noted in medication list.  Current supplements as per medication list.     Allergies: Ace inhibitors and Albuterol    Current social contact/activities: Pt plays cards with her neighbor. Pt is social with her [employer]. Pt spends time with family.     Is patient current with immunizations? No, due for TDAP and SHINGRIX (Shingles). Patient is interested in receiving NONE today.    She  reports that she is a non-smoker but has been exposed to tobacco smoke. She has never used smokeless tobacco. She reports that she drinks about 0.6 oz of alcohol per week . She reports that she does not use drugs.  Counseling given: Not Answered        DPA/Advanced directive: Patient does not have an Advanced Directive.  A packet and workshop information was given on Advanced Directives.    ROS:    Gait: Uses a cane   Ostomy: No   Other tubes: No   Amputations: No   Chronic oxygen use Yes   Last eye exam pt states 3 weeks ago   Wears hearing aids: Yes   : Reports urinary leakage during the last 6 months that has somewhat interfered with their daily activities or sleep.      Screening:    DEPRESSION     Is patient seeing a counselor, psychiatrist or other healthcare provider regarding their mental health? No, and not interested.     Depression Screen (PHQ-2/PHQ-9) 2/13/2018 2/21/2018 5/22/2019   PHQ-2 Total Score - - -   PHQ-2 Total Score 0 0 1   PHQ-9 Total Score - - 3       Interpretation of PHQ-9 Total Score   Score Severity   1-4 No Depression   5-9 Mild Depression   10-14 Moderate Depression   15-19 Moderately Severe Depression   20-27 Severe Depression      COPD    1.  Is patient under the care of a pulmonologist? Yes, CareTeam was updated.  2.  Has patient ever completed a PFT or spirometry? Yes, on pt states she thinks it was 2 years ago.  3.  Is patient on oxygen or CPAP? Yes, CareTeam was updated.  4.  Has patient ever had  instruction on inhaler technique by health care professional? Yes  5.  Is patient interested in a referral to respiratory therapy for more information on COPD, inhaler technique, and/or information on establishing an action plan?  No, patient is NOT interested.      Depression Screening    Little interest or pleasure in doing things?  1 - several days  Feeling down, depressed, or hopeless? 0 - not at all  Trouble falling or staying asleep, or sleeping too much?  1 - several days  Feeling tired or having little energy?  1 - several days  Poor appetite or overeating?  0 - not at all  Feeling bad about yourself - or that you are a failure or have let yourself or your family down? 0 - not at all  Trouble concentrating on things, such as reading the newspaper or watching television? 0 - not at all  Moving or speaking so slowly that other people could have noticed.  Or the opposite - being so fidgety or restless that you have been moving around a lot more than usual?  0 - not at all  Thoughts that you would be better off dead, or of hurting yourself?  0 - not at all  Patient Health Questionnaire Score: 3      If depressive symptoms identified deferred to follow up visit unless specifically addressed in assessment and plan.    Interpretation of PHQ-9 Total Score   Score Severity   1-4 No Depression   5-9 Mild Depression   10-14 Moderate Depression   15-19 Moderately Severe Depression   20-27 Severe Depression      Screening for Cognitive Impairment    Three Minute Recall (village, kitchen, baby)  3/3    Draw clock face with all 12 numbers and set the hands to show 10 past 10.  No 2/5  If cognitive concerns identified, deferred for follow up unless specifically addressed in assessment and plan.    Fall Risk Assessment    Has the patient had two or more falls in the last year or any fall with injury in the last year?  Yes  If fall risk identified, deferred for follow up unless specifically addressed in assessment and  plan.      Safety Assessment    Throw rugs on floor.  No  Handrails on all stairs.  Yes  Good lighting in all hallways.  Yes  Difficulty hearing.  No  Patient counseled about all safety risks that were identified.    Functional Assessment ADLs    Are there any barriers preventing you from cooking for yourself or meeting nutritional needs?  No.    Are there any barriers preventing you from driving safely or obtaining transportation?  No.    Are there any barriers preventing you from using a telephone or calling for help?  No.    Are there any barriers preventing you from shopping?  No.    Are there any barriers preventing you from taking care of your own finances?  No.    Are there any barriers preventing you from managing your medications?    No.    Are there any barriers preventing you from showering, bathing or dressing yourself?  No.    Are you currently engaging in any exercise or physical activity?  No.     What is your perception of your health?  Fair.    Health Maintenance Summary                IMM DTaP/Tdap/Td Vaccine Overdue 2/9/1955     IMM ZOSTER VACCINES Overdue 2/9/1986     PFT SCREENING-FEV1 AND FEV/FVC RATIO / SPIROMETRY SHOULD BE PERFORMED ANNUALLY Overdue 5/17/2017      Done 5/17/2016 AMB SIMPLE SPIROMETRY     Patient has more history with this topic...    Annual Wellness Visit Overdue 2/14/2019      Done 2/13/2018 Visit Dx: Medicare annual wellness visit, subsequent     Patient has more history with this topic...    IMM INFLUENZA Next Due 9/1/2019      Done 2/13/2018 Imm Admin: Influenza Vaccine Adult HD     Patient has more history with this topic...    BONE DENSITY Next Due 9/16/2019      Done 9/16/2014 DS-BONE DENSITY STUDY (DEXA)     Patient has more history with this topic...    COLONOSCOPY Next Due 5/28/2020      Done 5/28/2015 AMB REFERRAL TO GI FOR COLONOSCOPY     Patient has more history with this topic...          Patient Care Team:  Ezequiel Grewal M.D. as PCP - General (Family  Medicine)  Cipriano Ortiz M.D. as Consulting Physician (Endocrinology)  Zoltan Salazar M.D. as Consulting Physician (Gynecologic Oncology)  ERIKA Borrego as Mid Level Provider (Cardiology)  [employer]  as Respiratory (DME Supplier)  Luis Enrique Pulido M.D. as Consulting Physician (Otolaryngology)  Zoltan Salazar M.D. as Consulting Physician (Gynecologic Oncology)  Dyan Bardales M.D. as Consulting Physician (Pulmonary Medicine)  Sylvain Romano M.D. as Consulting Physician (Ophthalmology)  Modesto Mishra, PT as Physical Therapy      Social History   Substance Use Topics   • Smoking status: Passive Smoke Exposure - Never Smoker   • Smokeless tobacco: Never Used   • Alcohol use 0.6 oz/week     1 Standard drinks or equivalent per week      Comment: rarely     Family History   Problem Relation Age of Onset   • Heart Disease Mother         [email or web address]   • Cancer Father         throat Cancer   • Heart Disease Sister    • Cancer Brother         brain tumor   • Cancer Sister         breast   • Cancer Sister         lung   • Other Sister         myeloma     She  has a past medical history of Allergic rhinitis; Arthritis; ASTHMA; Atrial fibrillation (HCC) (January 2015); B12 deficiency; Cancer (HCC); CHI (closed head injury); COPD (chronic obstructive pulmonary disease) ([facility]); Degenerative joint disease; Dental disorder; Depression; GERD (gastroesophageal reflux disease); Hemorrhoids; [place] (hard of hearing) ( ); HSV-1 infection; Hypertension; Hypothyroidism; MEDICAL HOME; Meningitis; Osteoporosis; Shingles; Sinusitis due to Moraxella catarrhalis; Stress incontinence; Thyroid cancer (HCC); and Vulvar cancer (HCC). She also has no past medical history of Breast cancer ([facility]).   Past Surgical History:   Procedure Laterality Date   • GASTROSCOPY WITH BIOPSY N/A 5/7/2015    Procedure: GASTROSCOPY WITH BIOPSY;  Surgeon: Travis Jones M.D.;  Location: SURGERY [facility];  Service:    • COLONOSCOPY WITH BIOPSY N/A 5/7/2015     Procedure: COLONOSCOPY WITH BIOPSY;  Surgeon: Travis Jones M.D.;  Location: SURGERY [facility];  Service:    • HYSTERECTOMY ROBOTIC  12/27/2013    Performed by Zoltan Salazar M.D. at SURGERY [facility]   • CYSTOSCOPY STENT PLACEMENT  12/27/2013    Performed by Zlotan Salazar M.D. at [facility]   • OTHER       Removal Rt sinus osteoma   • OTHER SURGICAL PROCEDURE      ORx3 cancer removal (vulvar Ca.)   • THYROIDECTOMY       Due to thyroid cancern   • TONSILLECTOMY           Exam:     /70 (BP Location: Left arm, Patient Position: Sitting, BP Cuff Size: Adult)   Pulse 79   Temp 36.5 °C (97.7 °F) (Temporal)   Ht 1.524 m (5')   Wt 54.4 kg (120 lb)   SpO2 95%  Body mass index is 23.44 kg/m².    Constitutional: Alert, no distress.  Skin: Warm, dry, good turgor, no rashes in visible areas.  Eye: Equal, round and reactive, conjunctiva clear, lids normal.  Respiratory: Unlabored respiratory effort, lungs clear to auscultation, no wheezes, no ronchi.  Cardiovascular: Normal S1, S2, no murmur, no edema.  Psych: Alert and oriented x3, normal affect and mood.        Assessment and Plan. The following treatment and monitoring plan is recommended:      1. Medicare annual wellness visit, subsequent  - Subsequent Annual Wellness Visit - Includes PPPS ()    2. Paroxysmal atrial fibrillation (HCC)  Patient developed GI bleeding while on Coumadin.  She declines aspirin.  Continue to monitor.  - Subsequent Annual Wellness Visit - Includes PPPS ()    3. Chronic obstructive pulmonary disease, unspecified COPD type (HCC)  Stable.  Continue to monitor.  - Subsequent Annual Wellness Visit - Includes PPPS ()    4. MDD (major depressive disorder), recurrent episode, moderate (HCC)  Controlled.  Continue Paxil 20 mg daily.  - Subsequent Annual Wellness Visit - Includes PPPS ()    5. Poor balance  Patient is now using a cane whenever she goes.  - Subsequent Annual Wellness Visit -  Includes PPPS ()  - Patient identified as fall risk.  Appropriate orders and counseling given.    6. Hypothyroidism, unspecified type  Patient is being followed by endocrinology, Dr. Ortiz.  Advised patient that her thyroid is being overtreated again.  - Subsequent Annual Wellness Visit - Includes PPPS ()    7. B12 deficiency  Controlled.  Continue vitamin B12 supplementation per  - Subsequent Annual Wellness Visit - Includes PPPS ()    8. Gastroesophageal reflux disease without esophagitis  Controlled.  Continue omeprazole.  - Subsequent Annual Wellness Visit - Includes PPPS ()    9. Mixed incontinence urge and stress  Controlled.  Continue oxybutynin.  - Subsequent Annual Wellness Visit - Includes PPPS ()    10. Age-related osteoporosis without current pathological fracture  Patient is on Prolia every 6 months.  She is being followed by endocrinology.  - Subsequent Annual Wellness Visit - Includes PPPS ()        Services suggested: No services needed at this time  Health Care Screening recommendations as per orders if indicated.  Referrals offered: PT/OT/Nutrition counseling/Behavioral Health/Smoking cessation as per orders if indicated.    Discussion today about general wellness and lifestyle habits:    · Prevent falls and reduce trip hazards; Cautioned about securing or removing rugs.  · Have a working fire alarm and carbon monoxide detector;   · Engage in regular physical activity and social activities       Follow-up: Return in about 1 year (around 5/22/2020) for Annual.

## 2019-06-04 ENCOUNTER — TELEPHONE (OUTPATIENT)
Dept: MEDICAL GROUP | Facility: MEDICAL CENTER | Age: 84
End: 2019-06-04

## 2019-06-04 DIAGNOSIS — M81.0 AGE-RELATED OSTEOPOROSIS WITHOUT CURRENT PATHOLOGICAL FRACTURE: ICD-10-CM

## 2019-06-04 NOTE — TELEPHONE ENCOUNTER
Patient requesting recurrent referral for Dr. Ortiz in Endocrinology.  Patient sees him 2x per year. Patient also requesting order for Prolia injections. Please advise.

## 2019-06-05 DIAGNOSIS — M81.0 AGE-RELATED OSTEOPOROSIS WITHOUT CURRENT PATHOLOGICAL FRACTURE: ICD-10-CM

## 2019-07-15 ENCOUNTER — OUTPATIENT INFUSION SERVICES (OUTPATIENT)
Dept: ONCOLOGY | Facility: MEDICAL CENTER | Age: 84
End: 2019-07-15
Attending: FAMILY MEDICINE
Payer: MEDICARE

## 2019-07-15 VITALS
WEIGHT: 124.56 LBS | DIASTOLIC BLOOD PRESSURE: 85 MMHG | SYSTOLIC BLOOD PRESSURE: 163 MMHG | OXYGEN SATURATION: 96 % | HEIGHT: 59 IN | HEART RATE: 98 BPM | RESPIRATION RATE: 18 BRPM | BODY MASS INDEX: 25.11 KG/M2 | TEMPERATURE: 98 F

## 2019-07-15 PROCEDURE — 306780 HCHG STAT FOR TRANSFUSION PER CASE

## 2019-07-15 NOTE — PROGRESS NOTES
Patient arrives to [facility] for scheduled Prolia.  Pt reports having planned repair of dental bridge at the end of the month.  Consulted with pharmacy and recommendation to have patient complete all planned dental work prior to receiving Prolia.  Informed patient she will dental clearance and will need to wait 4-6 weeks post dental work to re-schedule Prolia.  Gave patient scheduling dept phone number.  Pt agrees with plan and dc home to self care.

## 2019-08-12 ENCOUNTER — HOSPITAL ENCOUNTER (OUTPATIENT)
Facility: MEDICAL CENTER | Age: 84
End: 2019-08-12
Attending: FAMILY MEDICINE
Payer: MEDICARE

## 2019-08-12 ENCOUNTER — OFFICE VISIT (OUTPATIENT)
Dept: MEDICAL GROUP | Facility: MEDICAL CENTER | Age: 84
End: 2019-08-12
Payer: MEDICARE

## 2019-08-12 VITALS
RESPIRATION RATE: 18 BRPM | SYSTOLIC BLOOD PRESSURE: 140 MMHG | BODY MASS INDEX: 24.6 KG/M2 | HEIGHT: 59 IN | TEMPERATURE: 97 F | DIASTOLIC BLOOD PRESSURE: 78 MMHG | WEIGHT: 122 LBS | HEART RATE: 96 BPM | OXYGEN SATURATION: 96 %

## 2019-08-12 DIAGNOSIS — R26.89 POOR BALANCE: ICD-10-CM

## 2019-08-12 DIAGNOSIS — E03.9 HYPOTHYROIDISM, UNSPECIFIED TYPE: ICD-10-CM

## 2019-08-12 DIAGNOSIS — F33.1 MDD (MAJOR DEPRESSIVE DISORDER), RECURRENT EPISODE, MODERATE (HCC): ICD-10-CM

## 2019-08-12 DIAGNOSIS — N39.46 MIXED INCONTINENCE URGE AND STRESS: ICD-10-CM

## 2019-08-12 DIAGNOSIS — K21.9 GASTROESOPHAGEAL REFLUX DISEASE WITHOUT ESOPHAGITIS: ICD-10-CM

## 2019-08-12 DIAGNOSIS — G47.33 OSA ON CPAP: ICD-10-CM

## 2019-08-12 DIAGNOSIS — H91.13 PRESBYCUSIS OF BOTH EARS: ICD-10-CM

## 2019-08-12 LAB
APPEARANCE UR: CLEAR
BILIRUB UR STRIP-MCNC: NEGATIVE MG/DL
COLOR UR AUTO: NORMAL
GLUCOSE UR STRIP.AUTO-MCNC: NEGATIVE MG/DL
KETONES UR STRIP.AUTO-MCNC: NEGATIVE MG/DL
LEUKOCYTE ESTERASE UR QL STRIP.AUTO: NORMAL
NITRITE UR QL STRIP.AUTO: NEGATIVE
PH UR STRIP.AUTO: 6 [PH] (ref 5–8)
PROT UR QL STRIP: NEGATIVE MG/DL
RBC UR QL AUTO: NEGATIVE
SP GR UR STRIP.AUTO: 1
UROBILINOGEN UR STRIP-MCNC: 0.2 MG/DL

## 2019-08-12 PROCEDURE — 99214 OFFICE O/P EST MOD 30 MIN: CPT | Performed by: FAMILY MEDICINE

## 2019-08-12 PROCEDURE — 81002 URINALYSIS NONAUTO W/O SCOPE: CPT | Performed by: FAMILY MEDICINE

## 2019-08-12 PROCEDURE — 87086 URINE CULTURE/COLONY COUNT: CPT

## 2019-08-12 RX ORDER — PAROXETINE 30 MG/1
30 TABLET, FILM COATED ORAL DAILY
Qty: 90 TAB | Refills: 3 | Status: SHIPPED | OUTPATIENT
Start: 2019-08-12 | End: 2020-06-11

## 2019-08-12 RX ORDER — PAROXETINE HYDROCHLORIDE 20 MG/1
20 TABLET, FILM COATED ORAL DAILY
Qty: 90 TAB | Refills: 3 | Status: CANCELLED | OUTPATIENT
Start: 2019-08-12

## 2019-08-12 RX ORDER — OMEPRAZOLE 40 MG/1
40 CAPSULE, DELAYED RELEASE ORAL DAILY
Qty: 90 CAP | Refills: 3 | Status: SHIPPED | OUTPATIENT
Start: 2019-08-12 | End: 2020-10-05

## 2019-08-12 NOTE — ASSESSMENT & PLAN NOTE
Would like to do a repeat sleep study, last sleep study was 3-4 years ago. She is having trouble tolerating her current mask.

## 2019-08-12 NOTE — PROGRESS NOTES
Subjective:   Tania Aguilar is a 84 y.o. female here today for walker, decreased hearing, GERD, depression    SEA on CPAP  Would like to do a repeat sleep study, last sleep study was 3-4 years ago. She is having trouble tolerating her current mask.    Hard of hearing  Decreased hearing recently, worse in right than left.    GERD (gastroesophageal reflux disease)  GERD has increased recently, she took 2 omeprazole 20 mg at once and had improvement of symptoms.    Poor balance  Uses cane around the house but still falling.  She is requesting a 4 wheeled walker with seat.  She has difficulty ambulating long distances and will need to sit frequently.  She also has difficulty with driving.  She does not feel safe as a .    Mixed incontinence urge and stress  Taking oxybutynin SR 5 mg daily, but has dry mouth, so thinking of switching to every other day.  Family has noticed worsening memory over the last 2 months.    MDD (major depressive disorder), recurrent episode, moderate  Family has noticed more irritability and mood swings, on paroxetine 20 mg daily.      Patient has mobility limitation that significantly impairs safe and/or timely participation in one or more MRADLS (Ambulating, Dressing, Eating, Toileting, Grooming, Meal Prep, Instrumental)? Yes    Will recommended equipment sufficiently improve ability to participate and/or be aided in participation of MRADL? Yes    Does the patient or caregiver demonstrate ability and/or willingness to use the recommended equipment? Yes    Does the patient’s home environment support the use of the recommended equipment? Yes    Can the current mobility limitation be fully resolved by only a cane or walker (walking device)? Cane has not worked, requesting a 4 wheel walker  If YES, order walking device and do not proceed. If NO, proceed.         Current medicines (including changes today)  Current Outpatient Medications   Medication Sig Dispense Refill   • omeprazole  "(PRILOSEC) 40 MG delayed-release capsule Take 1 Cap by mouth every day. 90 Cap 3   • PARoxetine (PAXIL) 30 MG Tab Take 1 Tab by mouth every day. 90 Tab 3   • denosumab (PROLIA) 60 MG/ML Solution Inject 1 mL as instructed every 6 months. subcutaneous 1 mL 1   • oxybutynin SR (DITROPAN-XL) 5 MG TABLET SR 24 HR Take 1 Tab by mouth every day. 90 Tab 3   • cyanocobalamin (VITAMIN B-12) 1000 MCG/ML Solution 1,000 mcg by Intramuscular route every 7 days. On Wed     • cetirizine (ZYRTEC) 10 MG Tab Take 1 Tab by mouth every day.     • levothyroxine (SYNTHROID) 200 MCG Tab Take 1 Tab by mouth Every morning on an empty stomach.       No current facility-administered medications for this visit.      She  has a past medical history of Allergic rhinitis, Arthritis, ASTHMA, Atrial fibrillation ([facility]) (January 2015), B12 deficiency, Cancer ([facility]), CHI (closed head injury), COPD (chronic obstructive pulmonary disease) ([facility]), Degenerative joint disease, Dental disorder, Depression, GERD (gastroesophageal reflux disease), Hemorrhoids, [place] (hard of hearing) ( ), HSV-1 infection, Hypertension, Hypothyroidism, MEDICAL HOME, Meningitis, Osteoporosis, Shingles, Sinusitis due to Moraxella catarrhalis, Stress incontinence, Thyroid cancer ([facility]), and Vulvar cancer ([facility]). She also has no past medical history of Breast cancer ([facility]).    ROS   No chest pain, no shortness of breath       Objective:     /78 (BP Location: Right arm, Patient Position: Sitting)   Pulse 96   Temp 36.1 °C (97 °F)   Resp 18   Ht 1.499 m (4' 11\")   Wt 55.3 kg (122 lb)   SpO2 96%  Body mass index is 24.64 kg/m².   Physical Exam:  Constitutional: Alert, no distress.  Skin: Warm, dry, good turgor, no rashes in visible areas.  Eye: Equal, round and reactive, conjunctiva clear, lids normal.  Right cerumen impaction.  TM pearly gray on left side  Psych: Alert and oriented x3, normal affect and mood.        Assessment and Plan:   The following treatment plan was " discussed    1. Poor balance  Four-wheel walker with seat ordered.  Referral to occupational therapy for driving assessment.  - REFERRAL TO OCCUPATIONAL THERAPY Reason for Therapy: Eval/Treat/Report    2. Hypothyroidism, unspecified type  Referral to endocrinology per patient and family's request.  - REFERRAL TO ENDOCRINOLOGY    3. Presbycusis of both ears  Lavaged right ear.  Referral to audiology for hearing evaluation.  - REFERRAL TO AUDIOLOGY  - REFERRAL TO OCCUPATIONAL THERAPY Reason for Therapy: Eval/Treat/Report    4. Gastroesophageal reflux disease without esophagitis  Uncontrolled with omeprazole 20 mg daily.  Increase to omeprazole 40 mg daily.  - omeprazole (PRILOSEC) 40 MG delayed-release capsule; Take 1 Cap by mouth every day.  Dispense: 90 Cap; Refill: 3    5. SEA on CPAP  Sleep referral per patient's request.  - REFERRAL TO SLEEP STUDIES    6. Mixed incontinence urge and stress  Advised patient to try oxybutynin SR 5 mg every other day.  UA negative for infection today in clinic.  - POCT Urinalysis  - URINE CULTURE(NEW); Future    7. MDD (major depressive disorder), recurrent episode, moderate (HCC)  Uncontrolled.  Increase paroxetine from 20 mg daily to 30 mg daily.        Followup: Return if symptoms worsen or fail to improve.

## 2019-08-12 NOTE — ASSESSMENT & PLAN NOTE
Uses cane around the house but still falling.  She is requesting a 4 wheeled walker with seat.  She has difficulty ambulating long distances and will need to sit frequently.  She also has difficulty with driving.  She does not feel safe as a .

## 2019-08-12 NOTE — ASSESSMENT & PLAN NOTE
Taking oxybutynin SR 5 mg daily, but has dry mouth, so thinking of switching to every other day.  Family has noticed worsening memory over the last 2 months.

## 2019-08-13 DIAGNOSIS — N39.46 MIXED INCONTINENCE URGE AND STRESS: ICD-10-CM

## 2019-08-15 LAB
BACTERIA UR CULT: NORMAL
SIGNIFICANT IND 70042: NORMAL
SITE SITE: NORMAL
SOURCE SOURCE: NORMAL

## 2019-08-16 ENCOUNTER — TELEPHONE (OUTPATIENT)
Dept: MEDICAL GROUP | Facility: MEDICAL CENTER | Age: 84
End: 2019-08-16

## 2019-08-16 NOTE — TELEPHONE ENCOUNTER
----- Message from Ezequiel Grewal M.D. sent at 8/15/2019  4:13 PM PDT -----  Please notify patient that her urine culture is negative for infection.  Ezequiel Grewal M.D.

## 2019-08-16 NOTE — TELEPHONE ENCOUNTER
Phone Number Called: [phone] (home)       Call outcome: spoke to patient regarding message below    Message: Patient notified of results.

## 2019-10-03 ENCOUNTER — TELEPHONE (OUTPATIENT)
Dept: MEDICAL GROUP | Facility: MEDICAL CENTER | Age: 84
End: 2019-10-03

## 2019-10-03 NOTE — TELEPHONE ENCOUNTER
ESTABLISHED PATIENT PRE-VISIT PLANNING     Patient was NOT contacted to complete PVP.     Note: Patient will not be contacted if there is no indication to call.     1.  Reviewed notes from the last few office visits within the medical group: Yes    2.  If any orders were placed at last visit or intended to be done for this visit (i.e. 6 mos follow-up), do we have Results/Consult Notes?        •  Labs - Labs ordered, completed on 8/12/19 and results are in chart.   Note: If patient appointment is for lab review and patient did not complete labs, check with provider if OK to reschedule patient until labs completed.       •  Imaging - Imaging was not ordered at last office visit.       •  Referrals - Referral ordered, patient has NOT been seen.    3. Is this appointment scheduled as a Hospital Follow-Up? No    4.  Immunizations were updated in Epic using WebIZ?: Epic matches WebIZ       •  Web Iz Recommendations: FLU, TDAP, VARICELLA (Chicken Pox)  and SHINGRIX (Shingles)    5.  Patient is due for the following Health Maintenance Topics:   Health Maintenance Due   Topic Date Due   • IMM DTaP/Tdap/Td Vaccine (1 - Tdap) 02/09/1954   • IMM ZOSTER VACCINES (1 of 2) 02/09/1986   • PFT SCREENING-FEV1 AND FEV/FVC RATIO / SPIROMETRY SHOULD BE PERFORMED ANNUALLY  05/17/2017   • IMM INFLUENZA (1) 09/01/2019   • BONE DENSITY  09/16/2019     6. Orders for overdue Health Maintenance topics pended in Pre-Charting? N\A    7.  AHA (MDX) form printed for Provider? No, already completed    8.  Patient was informed to arrive 15 min prior to their scheduled appointment and bring in their medication bottles.

## 2019-10-04 ENCOUNTER — OFFICE VISIT (OUTPATIENT)
Dept: MEDICAL GROUP | Facility: MEDICAL CENTER | Age: 84
End: 2019-10-04
Payer: MEDICARE

## 2019-10-04 VITALS
WEIGHT: 122 LBS | HEIGHT: 59 IN | TEMPERATURE: 98.2 F | HEART RATE: 86 BPM | SYSTOLIC BLOOD PRESSURE: 138 MMHG | DIASTOLIC BLOOD PRESSURE: 76 MMHG | OXYGEN SATURATION: 96 % | BODY MASS INDEX: 24.6 KG/M2

## 2019-10-04 DIAGNOSIS — N64.4 BREAST PAIN, RIGHT: ICD-10-CM

## 2019-10-04 PROCEDURE — 99214 OFFICE O/P EST MOD 30 MIN: CPT | Performed by: FAMILY MEDICINE

## 2019-10-04 NOTE — ASSESSMENT & PLAN NOTE
Has been having right lateral breast pain, there is tenderness on palpation, 6/10 pain at times, has been there for a few weeks. No known trauma.  Last mammogram was in 2013, which was normal. No history of breast cancer, but sister had breast cancer.

## 2019-10-04 NOTE — PROGRESS NOTES
Subjective:   Tania Aguilar is a 84 y.o. female here today for right breast pain    Breast pain, right  Has been having right lateral breast pain, there is tenderness on palpation, 6/10 pain at times, has been there for a few weeks. No known trauma.  Last mammogram was in 2013, which was normal. No history of breast cancer, but sister had breast cancer.         Current medicines (including changes today)  Current Outpatient Medications   Medication Sig Dispense Refill   • omeprazole (PRILOSEC) 40 MG delayed-release capsule Take 1 Cap by mouth every day. 90 Cap 3   • PARoxetine (PAXIL) 30 MG Tab Take 1 Tab by mouth every day. 90 Tab 3   • denosumab (PROLIA) 60 MG/ML Solution Inject 1 mL as instructed every 6 months. subcutaneous 1 mL 1   • oxybutynin SR (DITROPAN-XL) 5 MG TABLET SR 24 HR Take 1 Tab by mouth every day. 90 Tab 3   • cyanocobalamin (VITAMIN B-12) 1000 MCG/ML Solution 1,000 mcg by Intramuscular route every 7 days. On Wed     • cetirizine (ZYRTEC) 10 MG Tab Take 1 Tab by mouth every day.     • levothyroxine (SYNTHROID) 200 MCG Tab Take 1 Tab by mouth Every morning on an empty stomach.       No current facility-administered medications for this visit.      She  has a past medical history of Allergic rhinitis, Arthritis, ASTHMA, Atrial fibrillation ([facility]) (January 2015), B12 deficiency, Cancer ([facility]), CHI (closed head injury), COPD (chronic obstructive pulmonary disease) ([facility]), Degenerative joint disease, Dental disorder, Depression, GERD (gastroesophageal reflux disease), Hemorrhoids, [place] (hard of hearing) ( ), HSV-1 infection, Hypertension, Hypothyroidism, MEDICAL HOME, Meningitis, Osteoporosis, Shingles, Sinusitis due to Moraxella catarrhalis, Stress incontinence, Thyroid cancer ([facility]), and Vulvar cancer ([facility]). She also has no past medical history of Breast cancer ([facility]).    ROS   Positive frequent coughing recently, no fever, no breast discharge       Objective:     /76 (BP Location: Right arm,  "Patient Position: Sitting)   Pulse 86   Temp 36.8 °C (98.2 °F)   Ht 1.499 m (4' 11\")   Wt 55.3 kg (122 lb)   SpO2 96%  Body mass index is 24.64 kg/m².   Physical Exam:  Constitutional: Alert, no distress.  Skin: Warm, dry, good turgor, no rashes in visible areas.  Eye: Equal, round and reactive, conjunctiva clear, lids normal.  Psych: Alert and oriented x3, normal affect and mood.  Breasts: Contour are normal bilaterally, no masses bilaterally, no axillary lymph nodes, there is tenderness to palpation over right lateral breast and right medial breast.      Assessment and Plan:   The following treatment plan was discussed    1. Breast pain, right  New problem.  Probably muscular strain of intercostal muscles from coughing recently.  However patient has a significant family history of breast cancer with her sister having breast cancer.  We will order an ultrasound of her right breast to rule out any concerning masses.  - US-BREAST LIMITED-RIGHT; Future      Followup: Return if symptoms worsen or fail to improve.         "

## 2019-11-04 ENCOUNTER — HOSPITAL ENCOUNTER (EMERGENCY)
Facility: MEDICAL CENTER | Age: 84
End: 2019-11-04
Attending: EMERGENCY MEDICINE
Payer: MEDICARE

## 2019-11-04 ENCOUNTER — HOSPITAL ENCOUNTER (OUTPATIENT)
Dept: RADIOLOGY | Facility: MEDICAL CENTER | Age: 84
End: 2019-11-04
Attending: FAMILY MEDICINE
Payer: MEDICARE

## 2019-11-04 ENCOUNTER — APPOINTMENT (OUTPATIENT)
Dept: RADIOLOGY | Facility: MEDICAL CENTER | Age: 84
End: 2019-11-04
Attending: EMERGENCY MEDICINE
Payer: MEDICARE

## 2019-11-04 ENCOUNTER — OFFICE VISIT (OUTPATIENT)
Dept: URGENT CARE | Facility: MEDICAL CENTER | Age: 84
End: 2019-11-04
Payer: MEDICARE

## 2019-11-04 ENCOUNTER — APPOINTMENT (OUTPATIENT)
Dept: ENDOCRINOLOGY | Facility: MEDICAL CENTER | Age: 84
End: 2019-11-04
Payer: MEDICARE

## 2019-11-04 VITALS
RESPIRATION RATE: 16 BRPM | HEIGHT: 60 IN | SYSTOLIC BLOOD PRESSURE: 145 MMHG | OXYGEN SATURATION: 94 % | BODY MASS INDEX: 22.85 KG/M2 | WEIGHT: 116.4 LBS | TEMPERATURE: 98.5 F | DIASTOLIC BLOOD PRESSURE: 68 MMHG | HEART RATE: 93 BPM

## 2019-11-04 VITALS
RESPIRATION RATE: 16 BRPM | BODY MASS INDEX: 2.22 KG/M2 | WEIGHT: 11 LBS | TEMPERATURE: 97.3 F | OXYGEN SATURATION: 97 % | HEIGHT: 59 IN | DIASTOLIC BLOOD PRESSURE: 82 MMHG | HEART RATE: 93 BPM | SYSTOLIC BLOOD PRESSURE: 172 MMHG

## 2019-11-04 DIAGNOSIS — R31.0 GROSS HEMATURIA: ICD-10-CM

## 2019-11-04 DIAGNOSIS — R10.9 LEFT FLANK PAIN: ICD-10-CM

## 2019-11-04 DIAGNOSIS — M54.9 ACUTE UPPER BACK PAIN: ICD-10-CM

## 2019-11-04 DIAGNOSIS — R10.11 ABDOMINAL PAIN, RUQ: ICD-10-CM

## 2019-11-04 DIAGNOSIS — M54.50 ACUTE LOW BACK PAIN DUE TO TRAUMA: ICD-10-CM

## 2019-11-04 DIAGNOSIS — S22.080A COMPRESSION FRACTURE OF T12 VERTEBRA, INITIAL ENCOUNTER (HCC): ICD-10-CM

## 2019-11-04 DIAGNOSIS — N64.4 BREAST PAIN, RIGHT: ICD-10-CM

## 2019-11-04 DIAGNOSIS — K59.00 CONSTIPATION, UNSPECIFIED CONSTIPATION TYPE: ICD-10-CM

## 2019-11-04 DIAGNOSIS — G89.11 ACUTE LOW BACK PAIN DUE TO TRAUMA: ICD-10-CM
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
## 2019-11-04 LAB
ALBUMIN SERPL BCP-MCNC: 3.7 G/DL (ref 3.2–4.9)
ALBUMIN/GLOB SERPL: 1.2 G/DL
ALP SERPL-CCNC: 104 U/L (ref 30–99)
ALT SERPL-CCNC: 13 U/L (ref 2–50)
ANION GAP SERPL CALC-SCNC: 12 MMOL/L (ref 0–11.9)
APPEARANCE UR: CLEAR
APPEARANCE UR: CLEAR
APTT PPP: 26.5 SEC (ref 24.7–36)
AST SERPL-CCNC: 12 U/L (ref 12–45)
BASOPHILS # BLD AUTO: 0.7 % (ref 0–1.8)
BASOPHILS # BLD: 0.05 K/UL (ref 0–0.12)
BILIRUB SERPL-MCNC: 0.3 MG/DL (ref 0.1–1.5)
BILIRUB UR QL STRIP.AUTO: NEGATIVE
BILIRUB UR STRIP-MCNC: NORMAL MG/DL
BUN SERPL-MCNC: 12 MG/DL (ref 8–22)
CALCIUM SERPL-MCNC: 9.1 MG/DL (ref 8.4–10.2)
CHLORIDE SERPL-SCNC: 102 MMOL/L (ref 96–112)
CO2 SERPL-SCNC: 22 MMOL/L (ref 20–33)
COLOR UR AUTO: YELLOW
COLOR UR: YELLOW
CREAT SERPL-MCNC: 0.68 MG/DL (ref 0.5–1.4)
EKG IMPRESSION: NORMAL
EOSINOPHIL # BLD AUTO: 0.18 K/UL (ref 0–0.51)
EOSINOPHIL NFR BLD: 2.6 % (ref 0–6.9)
ERYTHROCYTE [DISTWIDTH] IN BLOOD BY AUTOMATED COUNT: 40.2 FL (ref 35.9–50)
GLOBULIN SER CALC-MCNC: 3.2 G/DL (ref 1.9–3.5)
GLUCOSE SERPL-MCNC: 105 MG/DL (ref 65–99)
GLUCOSE UR STRIP.AUTO-MCNC: NEGATIVE MG/DL
GLUCOSE UR STRIP.AUTO-MCNC: NORMAL MG/DL
HCT VFR BLD AUTO: 35.6 % (ref 37–47)
HGB BLD-MCNC: 11.8 G/DL (ref 12–16)
IMM GRANULOCYTES # BLD AUTO: 0.01 K/UL (ref 0–0.11)
IMM GRANULOCYTES NFR BLD AUTO: 0.1 % (ref 0–0.9)
INR PPP: 0.96 (ref 0.87–1.13)
KETONES UR STRIP.AUTO-MCNC: NEGATIVE MG/DL
KETONES UR STRIP.AUTO-MCNC: NORMAL MG/DL
LEUKOCYTE ESTERASE UR QL STRIP.AUTO: NEGATIVE
LEUKOCYTE ESTERASE UR QL STRIP.AUTO: NORMAL
LIPASE SERPL-CCNC: 10 U/L (ref 7–58)
LYMPHOCYTES # BLD AUTO: 2.61 K/UL (ref 1–4.8)
LYMPHOCYTES NFR BLD: 38.3 % (ref 22–41)
MCH RBC QN AUTO: 28.8 PG (ref 27–33)
MCHC RBC AUTO-ENTMCNC: 33.1 G/DL (ref 33.6–35)
MCV RBC AUTO: 86.8 FL (ref 81.4–97.8)
MICRO URNS: NORMAL
MONOCYTES # BLD AUTO: 0.43 K/UL (ref 0–0.85)
MONOCYTES NFR BLD AUTO: 6.3 % (ref 0–13.4)
NEUTROPHILS # BLD AUTO: 3.54 K/UL (ref 2–7.15)
NEUTROPHILS NFR BLD: 52 % (ref 44–72)
NITRITE UR QL STRIP.AUTO: NEGATIVE
NITRITE UR QL STRIP.AUTO: NORMAL
NRBC # BLD AUTO: 0 K/UL
NRBC BLD-RTO: 0 /100 WBC
PH UR STRIP.AUTO: 6 [PH] (ref 5–8)
PH UR STRIP.AUTO: 6.5 [PH] (ref 5–8)
PLATELET # BLD AUTO: 392 K/UL (ref 164–446)
PMV BLD AUTO: 9.2 FL (ref 9–12.9)
POTASSIUM SERPL-SCNC: 4.2 MMOL/L (ref 3.6–5.5)
PROT SERPL-MCNC: 6.9 G/DL (ref 6–8.2)
PROT UR QL STRIP: NEGATIVE MG/DL
PROT UR QL STRIP: NORMAL MG/DL
PROTHROMBIN TIME: 13 SEC (ref 12–14.6)
RBC # BLD AUTO: 4.1 M/UL (ref 4.2–5.4)
RBC UR QL AUTO: NEGATIVE
RBC UR QL AUTO: NORMAL
SODIUM SERPL-SCNC: 136 MMOL/L (ref 135–145)
SP GR UR STRIP.AUTO: 1
SP GR UR STRIP.AUTO: <=1.005
UROBILINOGEN UR STRIP-MCNC: 0.2 MG/DL
WBC # BLD AUTO: 6.8 K/UL (ref 4.8–10.8)

## 2019-11-04 PROCEDURE — 74177 CT ABD & PELVIS W/CONTRAST: CPT

## 2019-11-04 PROCEDURE — 83690 ASSAY OF LIPASE: CPT

## 2019-11-04 PROCEDURE — 80053 COMPREHEN METABOLIC PANEL: CPT

## 2019-11-04 PROCEDURE — 700105 HCHG RX REV CODE 258: Performed by: EMERGENCY MEDICINE

## 2019-11-04 PROCEDURE — 85730 THROMBOPLASTIN TIME PARTIAL: CPT

## 2019-11-04 PROCEDURE — 81002 URINALYSIS NONAUTO W/O SCOPE: CPT | Performed by: NURSE PRACTITIONER

## 2019-11-04 PROCEDURE — 96374 THER/PROPH/DIAG INJ IV PUSH: CPT | Mod: XU

## 2019-11-04 PROCEDURE — 72131 CT LUMBAR SPINE W/O DYE: CPT

## 2019-11-04 PROCEDURE — 85610 PROTHROMBIN TIME: CPT

## 2019-11-04 PROCEDURE — 99214 OFFICE O/P EST MOD 30 MIN: CPT | Performed by: NURSE PRACTITIONER

## 2019-11-04 PROCEDURE — 99285 EMERGENCY DEPT VISIT HI MDM: CPT

## 2019-11-04 PROCEDURE — 76642 ULTRASOUND BREAST LIMITED: CPT | Mod: RT

## 2019-11-04 PROCEDURE — 700111 HCHG RX REV CODE 636 W/ 250 OVERRIDE (IP): Performed by: EMERGENCY MEDICINE

## 2019-11-04 PROCEDURE — 96375 TX/PRO/DX INJ NEW DRUG ADDON: CPT

## 2019-11-04 PROCEDURE — 93005 ELECTROCARDIOGRAM TRACING: CPT | Performed by: EMERGENCY MEDICINE

## 2019-11-04 PROCEDURE — 700117 HCHG RX CONTRAST REV CODE 255: Performed by: EMERGENCY MEDICINE

## 2019-11-04 PROCEDURE — 85025 COMPLETE CBC W/AUTO DIFF WBC: CPT

## 2019-11-04 PROCEDURE — G0279 TOMOSYNTHESIS, MAMMO: HCPCS

## 2019-11-04 PROCEDURE — 81003 URINALYSIS AUTO W/O SCOPE: CPT

## 2019-11-04 RX ORDER — EZETIMIBE 10 MG/1
10 TABLET ORAL EVERY EVENING
Refills: 3 | COMMUNITY
Start: 2019-09-06 | End: 2020-10-05

## 2019-11-04 RX ORDER — TRAMADOL HYDROCHLORIDE 50 MG/1
50 TABLET ORAL EVERY 4 HOURS PRN
Qty: 15 TAB | Refills: 0 | Status: SHIPPED | OUTPATIENT
Start: 2019-11-04 | End: 2019-11-08

## 2019-11-04 RX ORDER — SODIUM CHLORIDE 9 MG/ML
500 INJECTION, SOLUTION INTRAVENOUS ONCE
Status: COMPLETED | OUTPATIENT
Start: 2019-11-04 | End: 2019-11-04

## 2019-11-04 RX ORDER — CHLORHEXIDINE GLUCONATE ORAL RINSE 1.2 MG/ML
SOLUTION DENTAL
Refills: 0 | COMMUNITY
Start: 2019-08-14 | End: 2019-11-18

## 2019-11-04 RX ORDER — ONDANSETRON 2 MG/ML
4 INJECTION INTRAMUSCULAR; INTRAVENOUS ONCE
Status: COMPLETED | OUTPATIENT
Start: 2019-11-04 | End: 2019-11-04

## 2019-11-04 RX ORDER — MORPHINE SULFATE 4 MG/ML
4 INJECTION, SOLUTION INTRAMUSCULAR; INTRAVENOUS ONCE
Status: COMPLETED | OUTPATIENT
Start: 2019-11-04 | End: 2019-11-04

## 2019-11-04 RX ADMIN — IOHEXOL 100 ML: 350 INJECTION, SOLUTION INTRAVENOUS at 17:46

## 2019-11-04 RX ADMIN — MORPHINE SULFATE 4 MG: 4 INJECTION INTRAVENOUS at 16:43

## 2019-11-04 RX ADMIN — ONDANSETRON 4 MG: 2 INJECTION INTRAMUSCULAR; INTRAVENOUS at 16:43

## 2019-11-04 RX ADMIN — SODIUM CHLORIDE 500 ML: 9 INJECTION, SOLUTION INTRAVENOUS at 16:43

## 2019-11-04 NOTE — PROGRESS NOTES
Chief Complaint   Patient presents with   • Blood in Urine     fell 18 days ago and been having back pain, didnt have a bowel movement till 8 days after, blood in urine x4days        HISTORY OF PRESENT ILLNESS: Patient is a 84 y.o. female who presents to urgent care today with physical complaints after having a fall.  Notes that she had a fall approximately 18 days ago.  Notes that her left foot became stuck in between a chair and a table, causing her to fall.  She cannot recall the exact mechanism of fall but landed on the floor and was unable to get up for approximately 30 minutes.  Since the incident she is concerned as she has had pain to her low back and has noticed hematuria over the last 4 days.  She also notes ongoing constipation after the incident.  She denies any loss of consciousness from the event.  She is here today with her daughter, both provide the history.    Patient Active Problem List    Diagnosis Date Noted   • Atrial fibrillation (HCC) 05/01/2015     Priority: High   • Essential hypertension, benign 08/19/2009     Priority: High   • Hard of hearing 05/03/2011     Priority: Medium   • Asthma, mild intermittent, well-controlled      Priority: Medium   • GERD (gastroesophageal reflux disease)      Priority: Medium   • Heart murmur      Priority: Medium   • COPD (chronic obstructive pulmonary disease) ([facility])      Priority: Medium   • B12 deficiency 05/03/2011     Priority: Low   • Mixed incontinence urge and stress      Priority: Low   • MDD (major depressive disorder), recurrent episode, moderate ([facility])      Priority: Low   • DJD (degenerative joint disease)      Priority: Low   • History of vulvar dysplasia      Priority: Low   • Hypothyroidism 08/19/2009     Priority: Low   • Breast pain, right 10/04/2019   • Poor balance 02/01/2019   • Mild intermittent asthma without complication 07/19/2017   • Osteoporosis 09/14/2016   • Seasonal allergies 09/14/2016   • Prediabetes 09/14/2016   • SEA on CPAP  07/07/2016       Allergies:Ace inhibitors and Albuterol    Current Outpatient Medications Ordered in Epic   Medication Sig Dispense Refill   • chlorhexidine (PERIDEX) 0.12 % Solution RINSE WITH 15ML FOR 30 SECONDS AND SPIT USE TWICE DAILY AFTER BRUSHING AND FLOSSING .  0   • ezetimibe (ZETIA) 10 MG Tab Take 10 mg by mouth.  3   • omeprazole (PRILOSEC) 40 MG delayed-release capsule Take 1 Cap by mouth every day. 90 Cap 3   • PARoxetine (PAXIL) 30 MG Tab Take 1 Tab by mouth every day. 90 Tab 3   • denosumab (PROLIA) 60 MG/ML Solution Inject 1 mL as instructed every 6 months. subcutaneous 1 mL 1   • oxybutynin SR (DITROPAN-XL) 5 MG TABLET SR 24 HR Take 1 Tab by mouth every day. 90 Tab 3   • cyanocobalamin (VITAMIN B-12) 1000 MCG/ML Solution 1,000 mcg by Intramuscular route every 7 days. On Wed     • cetirizine (ZYRTEC) 10 MG Tab Take 1 Tab by mouth every day.     • levothyroxine (SYNTHROID) 200 MCG Tab Take 1 Tab by mouth Every morning on an empty stomach.       No current [facility]-ordered facility-administered medications on file.        Past Medical History:   Diagnosis Date   • Allergic rhinitis    • Arthritis     Hands   • ASTHMA    • Atrial fibrillation (HCC) January 2015    New onset. Echocardiogram with normal LV size, mild concentric LVH, LVEF 65-70%. Normal RA and LA. Mild MR, mild TR, RVSP 35-40mmHg. June 2015: GI bleed, Coumadin stopped/contraindicated.   • B12 deficiency    • Cancer (HCC)     Vulvar; skin; thyroid   • CHI (closed head injury)    • COPD (chronic obstructive pulmonary disease) (HCC)    • Degenerative joint disease    • Dental disorder     One broken tooth   • Depression    • GERD (gastroesophageal reflux disease)    • Hemorrhoids    • [place] (hard of hearing)      Bilaterally   • HSV-1 infection    • Hypertension    • Hypothyroidism    • MEDICAL HOME    • Meningitis    • Osteoporosis    • Shingles    • Sinusitis due to Moraxella catarrhalis    • Stress incontinence    • Thyroid cancer (HCC)    •  "Vulvar cancer (HCC)        Social History     Tobacco Use   • Smoking status: Passive Smoke Exposure - Never Smoker   • Smokeless tobacco: Never Used   Substance Use Topics   • Alcohol use: Yes     Alcohol/week: 0.6 oz     Types: 1 Standard drinks or equivalent per week     Comment: rarely   • Drug use: No       Family Status   Relation Name Status   • Mo     • Fa     • Sis  Alive   • Bro     • Sis  Alive   • Bro     • MGMo old age    • MGFa old age    • PGMo old age    • PGFa old age    • Sis     • Sis     • Sis     • Sis     • Sis       Family History   Problem Relation Age of Onset   • Heart Disease Mother         [email or web address].[email or web address]   • Cancer Father         throat Cancer   • Heart Disease Sister    • Cancer Brother         brain tumor   • Cancer Sister         breast   • Cancer Sister         lung   • Other Sister         myeloma       ROS:  Review of Systems   Constitutional: Negative for fever, chills, weight loss, malaise, and fatigue.   HENT: Negative for ear pain, nosebleeds, congestion, sore throat and neck pain.    Eyes: Negative for vision changes.   Neuro: Negative for headache, sensory changes, weakness, seizure, LOC.   Cardiovascular: Negative for chest pain, palpitations, orthopnea and leg swelling.   Respiratory: Negative for cough, sputum production, shortness of breath and wheezing.   Gastrointestinal: Negative for abdominal pain, nausea, vomiting or diarrhea.   Genitourinary: Positive for left flank pain, hematuria.  Negative for dysuria, urgency and frequency.  Musculoskeletal: Positive for falls, low back pain.  Neck pain, joint pain, myalgias.   Skin: Negative for rash, diaphoresis.     Exam:  BP (!) 172/82   Pulse 93   Temp 36.3 °C (97.3 °F) (Temporal)   Resp 16   Ht 1.499 m (4' 11\")   Wt (!) 4.99 kg (11 lb)   SpO2 97%   General: well-nourished, well-developed female in NAD  Head: normocephalic, " atraumatic  Eyes: PERRLA, no conjunctival injection, acuity grossly intact, lids normal.  Ears: normal shape and symmetry, no tenderness, no discharge. External canals are without any significant edema or erythema. Tympanic membranes are without any inflammation, no effusion. Gross auditory acuity is intact.  Nose: symmetrical without tenderness, no discharge.  Mouth/Throat: reasonable hygiene, no erythema, exudates or tonsillar enlargement.  Neck: no masses, range of motion within normal limits, no tracheal deviation. No obvious thyroid enlargement.   Lymph: no cervical adenopathy. No supraclavicular adenopathy.   Neuro: alert and oriented. Cranial nerves 1-12 grossly intact. No sensory deficit.   Cardiovascular: regular rate and rhythm. No edema.  Pulmonary: no distress. Chest is symmetrical with respiration, no wheezes, crackles, or rhonchi.   Abdomen: soft, right upper quadrant tenderness, no guarding, no hepatosplenomegaly.  Left CVA tenderness.  Musculoskeletal: no clubbing, appropriate muscle tone, gait is stable.  There is midline tenderness to inferior cervical and thoracic spine.  There is also midline tenderness to lumbar spine.  Skin: warm, dry, intact, no clubbing, no cyanosis, no rashes.   Psych: appropriate mood, affect, judgement.         Assessment/Plan:  1. Fall, initial encounter     2. Gross hematuria  POCT Urinalysis   3. Abdominal pain, RUQ     4. Left flank pain     5. Acute low back pain due to trauma     6. Acute upper back pain         Patient is a very pleasant, well-appearing 84-year-old female presents clinic today with several physical complaints after having a fall over 2 weeks ago.  She is most concerned about her low back pain, flank pain, and hematuria.  On examination she does not have any blood in her urine today but does have CVA tenderness.  Furthermore, she does have midline tenderness to her cervical, thoracic, and lumbar spine.  She also has tenderness to her right upper  quadrant.  Due to her mechanism and physical findings, I feel the patient requires a higher level of care in the ED for closer monitoring, stat lab work and/or imaging for further evaluation. This has been discussed with the patient and she states agreement and understanding. The patient is stable to leave POV at this time and will go directly to ED without delay.  She will remain n.p.o., accompanied by her daughter.        Please note that this dictation was created using voice recognition software. I have made every reasonable attempt to correct obvious errors, but I expect that there are errors of grammar and possibly content that I did not discover before finalizing the note.      MARLEN Clayton.

## 2019-11-04 NOTE — ED TRIAGE NOTES
Pt comes in w/ daughter  c/o lower back pain R side of abdomen area   S/p had a fall 4 days ago while she was OOT  Did not get medical treatment until now  C/o voiding bloody urine as well  Pain getting worse  Tylenol not helping

## 2019-11-05 NOTE — ED PROVIDER NOTES
ED Provider Note    CHIEF COMPLAINT  Chief Complaint   Patient presents with   • Fall     about 4 days ago injuring back landed on R side however L side is hurting    • Urinating Blood     start about 4 days ago after falling     • Abdominal Pain     s/p fall 4 days ago  landed on R side        HPI  Tania Aguilar is a 84 y.o. female who presents to the ED with complaints of abdominal pain back pain and hematuria.  Patient states about 18 days ago she had a fall while she was at a family type reunion.  Since that time she is been having progressively worsening back pain primary within the lower lumbar region.  Initially the patient was constipated but then started having some bowel movements.  Then she started some increasing abdominal pain and then she is noticed some hematuria over the past 4 days.  Patient denies any overt fevers chills nausea vomiting describes the back pain is progressively worsening primarily within the mid to lower lumbar region.  Patient denies any other symptoms presents for evaluation.    REVIEW OF SYSTEMS  See HPI for further details. All other systems are negative.     PAST MEDICAL HISTORY  Past Medical History:   Diagnosis Date   • Allergic rhinitis    • Arthritis     Hands   • ASTHMA    • Atrial fibrillation (HCC) January 2015    New onset. Echocardiogram with normal LV size, mild concentric LVH, LVEF 65-70%. Normal RA and LA. Mild MR, mild TR, RVSP 35-40mmHg. June 2015: GI bleed, Coumadin stopped/contraindicated.   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    • Stress incontinence    • Thyroid cancer (HCC)    • Vulvar cancer (HCC)        FAMILY HISTORY  Family History   Problem Relation Age of Onset   • Heart Disease Mother         cva.tia   • Cancer Father         throat Cancer   • Heart Disease Sister    • Cancer Brother         brain tumor   • Cancer Sister         breast   • Cancer Sister         lung   • Other Sister         myeloma     Patient's family history has been discussed and is been found to be noncontributory to his present illness  SOCIAL HISTORY  Social History     Socioeconomic History   • Marital status:      Spouse name: Not on file   • Number of children: Not on file   • Years of education: Not on file   • Highest education level: Not on file   Occupational History   • Not on file   Social Needs   • Financial resource strain: Not on file   • Food insecurity:     Worry: Not on file     Inability: Not on file   • Transportation needs:     Medical: Not on file     Non-medical: Not on file   Tobacco Use   • Smoking status: Passive Smoke Exposure - Never Smoker   • Smokeless tobacco: Never Used   Substance and Sexual Activity   • Alcohol use: Yes     Alcohol/week: 0.6 oz     Types: 1 Standard drinks or equivalent per week     Comment: rarely   • Drug use: No   • Sexual activity: Never   Lifestyle   • Physical activity:     Days per week: Not on file     Minutes per session: Not on file   • Stress: Not on file   Relationships   • Social connections:     Talks on phone: Not on file     Gets together: Not on file     Attends Shinto service: Not on file     Active member of club or organization: Not on file     Attends meetings of clubs or organizations: Not on file     Relationship status: Not on file   • Intimate partner violence:     Fear of current or ex partner: Not on file     Emotionally abused: Not on file     Physically abused: Not on file     Forced sexual activity: Not on file   Other Topics  Concern   • Not on file   Social History Narrative   • Not on file      Ezequiel Grewal M.D.      SURGICAL HISTORY  Past Surgical History:   Procedure Laterality Date   • GASTROSCOPY WITH BIOPSY N/A 5/7/2015    Procedure: GASTROSCOPY WITH BIOPSY;  Surgeon: Travis Jones M.D.;  Location: [facility];  Service:    • COLONOSCOPY WITH BIOPSY N/A 5/7/2015    Procedure: COLONOSCOPY WITH BIOPSY;  Surgeon: Travis Jones M.D.;  Location: [facility];  Service:    • HYSTERECTOMY ROBOTIC  12/27/2013    Performed by Zoltan Salazar M.D. at SURGERY [facility]   • CYSTOSCOPY STENT PLACEMENT  12/27/2013    Performed by Zoltan Salazar M.D. at SURGERY [facility]   • OTHER       Removal Rt sinus osteoma   • OTHER SURGICAL PROCEDURE      ORx3 cancer removal (vulvar Ca.)   • THYROIDECTOMY       Due to thyroid cancern   • TONSILLECTOMY         CURRENT MEDICATIONS  Home Medications     Reviewed by Iqra Fong R.N. (Registered Nurse) on 11/04/19 at 1510  Med List Status: <None>   Medication Last Dose Status   cetirizine (ZYRTEC) 10 MG Tab  Active   chlorhexidine (PERIDEX) 0.12 % Solution  Active   cyanocobalamin (VITAMIN B-12) 1000 MCG/ML Solution  Active   denosumab (PROLIA) 60 MG/ML Solution  Active   ezetimibe (ZETIA) 10 MG Tab  Active   levothyroxine (SYNTHROID) 200 MCG Tab  Active   omeprazole (PRILOSEC) 40 MG delayed-release capsule  Active   oxybutynin SR (DITROPAN-XL) 5 MG TABLET SR 24 HR  Active   PARoxetine (PAXIL) 30 MG Tab  Active                ALLERGIES  Allergies   Allergen Reactions   • Ace Inhibitors      Cough     • Albuterol      Whole body spasmed  Patient tolerates Formoterol (Symbicort) - takes at home       PHYSICAL EXAM  VITAL SIGNS: /68   Pulse 93   Temp 36.9 °C (98.5 °F)   Resp 16   Ht 1.524 m (5')   Wt 52.8 kg (116 lb 6.5 oz)   LMP  (LMP Unknown)   SpO2 94%   BMI 22.73 kg/m²    Pulse Oximetry was obtained. It showed a reading of Pulse Oximetry: 97 %.   I interpreted this as nonhypoxic.     Constitutional: Elderly in appearance but otherwise well developed, Well nourished, No acute distress, Non-toxic appearance.   HENT: Normocephalic, Atraumatic, Bilateral external ears normal, bilateral tympanic membranes normal, Oropharynx dry mucous membranes, No oral exudates, Nose normal.   Eyes:  conjunctiva is normal, there are no signs of exudate.   Neck: Supple, no cervical lymphadenopathy, no meningeal signs..   Lymphatic: No lymphadenopathy noted.   Cardiovascular: Regular rate and rhythm without murmurs gallops or rubs.   Thorax & Lungs: Lungs are clear to auscultation bilaterally, there are no wheezes no rales. Chest wall is nontender.  Abdomen: Soft, mildly tender diffusely primarily in the suprapubic area nondistended. Bowel sounds are present.   Skin: Warm, Dry, No erythema,   Back: Tender about the lower lumbar region no CVA tenderness  Musculoskeletal: Good range of motion in all major joints. No tenderness to palpation or major deformities noted. Intact distal pulses, no clubbing, no cyanosis, no edema     Neurologic: Alert & oriented x 3, Normal motor function, Normal sensory function, No focal deficits noted.   Psychiatric: Affect normal, Judgment normal, Mood normal.     EKG  Interpreted below by myself    RADIOLOGY/PROCEDURES  CT-LSPINE W/O PLUS RECONS   Final Result      1.  Acute compression fracture of T12 with mild loss of height.   2.  Moderate to severe multilevel degenerative change of lumbar spine.   3.  No segmental malalignment.      CT-ABDOMEN-PELVIS WITH   Final Result      1.  Acute mild compression fracture of T12.   2.  Normal appendix.   3.  No focal mesenteric inflammatory process.   4.  Distended bladder.   5.  Increased colonic stool.   6.  Possible nodule in the RIGHT lung base laterally abutting the diaphragm measuring approximately 8 mm.          Results for orders placed or performed during the hospital encounter of 11/04/19   CBC WITH  DIFFERENTIAL   Result Value Ref Range    WBC 6.8 4.8 - 10.8 K/uL    RBC 4.10 (L) 4.20 - 5.40 M/uL    Hemoglobin 11.8 (L) 12.0 - 16.0 g/dL    Hematocrit 35.6 (L) 37.0 - 47.0 %    MCV 86.8 81.4 - 97.8 fL    MCH 28.8 27.0 - 33.0 pg    MCHC 33.1 (L) 33.6 - 35.0 g/dL    RDW 40.2 35.9 - 50.0 fL    Platelet Count 392 164 - 446 K/uL    MPV 9.2 9.0 - 12.9 fL    Neutrophils-Polys 52.00 44.00 - 72.00 %    Lymphocytes 38.30 22.00 - 41.00 %    Monocytes 6.30 0.00 - 13.40 %    Eosinophils 2.60 0.00 - 6.90 %    Basophils 0.70 0.00 - 1.80 %    Immature Granulocytes 0.10 0.00 - 0.90 %    Nucleated RBC 0.00 /100 WBC    Neutrophils (Absolute) 3.54 2.00 - 7.15 K/uL    Lymphs (Absolute) 2.61 1.00 - 4.80 K/uL    Monos (Absolute) 0.43 0.00 - 0.85 K/uL    Eos (Absolute) 0.18 0.00 - 0.51 K/uL    Baso (Absolute) 0.05 0.00 - 0.12 K/uL    Immature Granulocytes (abs) 0.01 0.00 - 0.11 K/uL    NRBC (Absolute) 0.00 K/uL   COMP METABOLIC PANEL   Result Value Ref Range    Sodium 136 135 - 145 mmol/L    Potassium 4.2 3.6 - 5.5 mmol/L    Chloride 102 96 - 112 mmol/L    Co2 22 20 - 33 mmol/L    Anion Gap 12.0 (H) 0.0 - 11.9    Glucose 105 (H) 65 - 99 mg/dL    Bun 12 8 - 22 mg/dL    Creatinine 0.68 0.50 - 1.40 mg/dL    Calcium 9.1 8.4 - 10.2 mg/dL    AST(SGOT) 12 12 - 45 U/L    ALT(SGPT) 13 2 - 50 U/L    Alkaline Phosphatase 104 (H) 30 - 99 U/L    Total Bilirubin 0.3 0.1 - 1.5 mg/dL    Albumin 3.7 3.2 - 4.9 g/dL    Total Protein 6.9 6.0 - 8.2 g/dL    Globulin 3.2 1.9 - 3.5 g/dL    A-G Ratio 1.2 g/dL   LIPASE   Result Value Ref Range    Lipase 10 7 - 58 U/L   URINALYSIS CULTURE, IF INDICATED   Result Value Ref Range    Color Yellow     Character Clear     Specific Gravity <=1.005 <1.035    Ph 6.0 5.0 - 8.0    Glucose Negative Negative mg/dL    Ketones Negative Negative mg/dL    Protein Negative Negative mg/dL    Bilirubin Negative Negative    Nitrite Negative Negative    Leukocyte Esterase Negative Negative    Occult Blood Negative Negative    Micro  Urine Req see below    PROTHROMBIN TIME (INR)   Result Value Ref Range    PT 13.0 12.0 - 14.6 sec    INR 0.96 0.87 - 1.13   APTT   Result Value Ref Range    APTT 26.5 24.7 - 36.0 sec   ESTIMATED GFR   Result Value Ref Range    GFR If African American >60 >60 mL/min/1.73 m 2    GFR If Non African American >60 >60 mL/min/1.73 m 2   EKG (NOW)   Result Value Ref Range    Report       [facility] Emergency Dept[facility]    Test Date:  2019  Pt Name:    PABLO RICH                Department: [facility]  MRN:        [id number]                      [facility]  Gender:     Female                       Technician: HRR  :        [id number]                   Requested By:JOANA CHILEL  Order #:    [id number]                    Reading MD: JOANA CHILEL MD    Measurements  Intervals                                Axis  Rate:       92                           P:          -14  CO:         150                          QRS:        49  QRSD:       76                           T:          50  QT:         339  QTc:        420    Interpretive Statements  Sinus rhythm  Compared to ECG 2015 01:24:01  Atrial fibrillation no longer present  Early repolarization no longer present  Normal appearing ECG  Electronically Signed On 2019 19:14:37 PST by JOANA CHILEL MD           COURSE & MEDICAL DECISION MAKING  Pertinent Labs & Imaging studies reviewed. (See chart for details)  Patient presents emerged department for evaluation.  She was given some morphine for pain control and had the above studies done to include the CT scan.  It does appear the patient is slightly constipated and she also has a T12 fracture as described above.  At this point she is feeling improved with morphine I will continue with pain medications.  Recommended for the patient to follow the primary care physician for recheck and follow-up with a spine physician.  We do not have any on-call here but at the main is Dr. Alaniz.   At this point the patient is feeling improved and I do for the patient is stable for discharge.  I recommended: Cleansing at home and following up as above.  No signs of infection.    FINAL IMPRESSION  1. Compression fracture of T12 vertebra, initial encounter ([facility])  tramadol (ULTRAM) 50 MG Tab   2. Constipation, unspecified constipation type Temporary           The patient will return for new or worsening symptoms and is stable at the time of discharge.    The patient is referred to a primary physician for blood pressure management, diabetic screening, and for all other preventative health concerns.    I reviewed prescription monitoring program for patient's narcotic use before prescribing a scheduled drug.The patient will not drink alcohol nor drive with prescribed medications      In prescribing controlled substances to this patient, I certify that I have obtained and reviewed the medical history this patient I have also made a good jannie effort to obtain applicable records from other providers who have treated the patient and records did not demonstrate any increased risk of substance abuse that would prevent me from prescribing controlled substances.     I have conducted a physical exam and documented it. I have reviewed Ms. Aguilar’s prescription history as maintained by the [place] Prescription Monitoring Program.     I have assessed the patient’s risk for abuse, dependency, and addiction using the validated Opioid Risk Tool available at https://www.mdcalc.com/ovwenv-lzvb-eehx-ort-narcotic-abuse.     Given the above, I believe the benefits of controlled substance therapy outweigh the risks. The reasons for prescribing controlled substances include in my professional opinion, controlled substances are a reasonable choice for this patient. Accordingly, I have discussed the risk and benefits, treatment plan, and alternative therapies with the patient. The patient has been consented for the medication and  understands the risks.        DISPOSITION:  Patient will be discharged home in stable condition.    FOLLOW UP:  Ezequiel Grewal M.D.  [address]  [address]  [facility] [id number] [phone]    Schedule an appointment as soon as possible for a visit   for referral to a spine surgeon    Dashawn Alaniz III, M.D.  [address]  [facility] [address]  [phone]            OUTPATIENT MEDICATIONS:  Discharge Medication List as of 11/4/2019  7:30 PM      START taking these medications    Details   tramadol (ULTRAM) 50 MG Tab Take 1 Tab by mouth every four hours as needed for up to 5 days., Disp-15 Tab, R-0, Print Rx Paper, For 5 days                     Electronically signed by: Ricco Hernandez, 11/4/2019 4:20 PM

## 2019-11-06 ENCOUNTER — OFFICE VISIT (OUTPATIENT)
Dept: ENDOCRINOLOGY | Facility: MEDICAL CENTER | Age: 84
End: 2019-11-06
Payer: MEDICARE

## 2019-11-06 VITALS
DIASTOLIC BLOOD PRESSURE: 70 MMHG | BODY MASS INDEX: 23.39 KG/M2 | HEART RATE: 72 BPM | WEIGHT: 116 LBS | HEIGHT: 59 IN | SYSTOLIC BLOOD PRESSURE: 112 MMHG | OXYGEN SATURATION: 92 %

## 2019-11-06 DIAGNOSIS — E03.9 HYPOTHYROIDISM, UNSPECIFIED TYPE: ICD-10-CM

## 2019-11-06 DIAGNOSIS — M80.08XA AGE-RELATED OSTEOPOROSIS WITH CURRENT PATHOLOGICAL FRACTURE, VERTEBRA(E), INITIAL ENCOUNTER FOR FRACTURE (HCC): ICD-10-CM

## 2019-11-06 DIAGNOSIS — S22.080D COMPRESSION FRACTURE OF T12 VERTEBRA WITH ROUTINE HEALING: ICD-10-CM

## 2019-11-06 DIAGNOSIS — M80.00XD AGE-RELATED OSTEOPOROSIS WITH CURRENT PATHOLOGICAL FRACTURE WITH ROUTINE HEALING, SUBSEQUENT ENCOUNTER: ICD-10-CM

## 2019-11-06 PROCEDURE — 99205 OFFICE O/P NEW HI 60 MIN: CPT | Performed by: INTERNAL MEDICINE

## 2019-11-06 NOTE — PROGRESS NOTES
Chief Complaint: Consult requested by Ezequiel Grewal M.D. for evaluation of Osteoporosis with recent fragility fracture    Subjective:      Tania Aguilar is a 84 y.o. female who I am asked to see in consultation for evaluation osteoporosis. She used to see Dr. Prasad and now seeing me due to insurance changes.   She was diagnosed with osteopenia by bone density scan in 2010 with the lowest T score of -1.5 for the left femur . Patient admits to history of fracture with an acute T12 fracture diagnosed on November 4, 2019 but she actually fell on October 2019 during a reunion party.     The cause of osteoporosis is felt to be due to postmenopausal estrogen deficiency, dietary calcium and/or vitamin D deficiency and iatrogenic hyperthyroidism. She is not currently being treated with calcium and vitamin D supplementation. She is not currently being treated with bisphosphonates     She was started on Prolia 2 years ago (March 2016).  She admits that she has missed some doses of the medication during the years.  She last received an injection on 12/2018.  In total she has only received 2 injections.  She is not aware about the risk of rebound bone loss when Prolia is stopped.    Aside from osteoporosis she has a history of primary hypothyroidism which was previously managed by her previous endocrinologist and it is notable that her TSH levels are markedly suppressed which is highly unusual given her advanced age and history of osteoporosis and atrial fibrillation.    She was being given levothyroxine 200 MCG daily and Cytomel 25 MCG daily by her previous endocrinologist and her last TSH was undetectable at less than 0.005 with the elevated free T4 of 1.68 and normal free T3 of 3.9 on May 14, 2019.    She denies palpitations, tremors, insomnia, heat intolerance, diarrhea, constipation, and cold intolerance    Osteoporosis Risk Factors   Nonmodifiable  Personal Hx of fracture as an adult: yes  Hx of fracture in  first-degree relative: no   race: yes  Advanced age: yes  Female sex: yes  Dementia: no  Poor health/frailty: yes     Potentially modifiable:  Tobacco use: no  Low body weight (<127 lbs): yes  Estrogen deficiency     early menopause (age <45) or bilateral ovariectomy: yes     prolonged premenopausal amenorrhea (>1 yr): yes  Low calcium intake (lifelong): yes  Alcoholism: no  Recurrent falls: yes  Inadequate physical activity: yes    Current calcium and Vit D intake:  Dietary sources: she is lactose intolerant  supplements: None        ROS:     CONS:     No fever, no chills, no weight loss, no fatigue   EYES:      No diplopia, no blurry vision, no redness of eyes, no swelling of eyelids   ENT:    No hearing loss, No ear pain, No sore throat, no dysphagia, no neck swelling   CV:     No chest pain, no palpitations, no claudication, no orthopnea, no PND   PULM:    No SOB, no cough, no hemoptysis, no wheezing    GI:   No nausea, no vomiting, no diarrhea, no constipation, no bloody stools   :  Passing urine well, no dysuria, no hematuria   ENDO:   No polyuria, no polydipsia, no heat intolerance, no cold intolerance   NEURO: No headaches, no dizziness, no convulsions, no tremors   MUSC:  No joint swellings, reports arthralgias, no myalgias, no weakness   SKIN:   No rash, no ulcers, no dry skin   PSYCH:   No depression, no anxiety, no difficulty sleeping       Past Medical History:  Patient Active Problem List    Diagnosis Date Noted   • Atrial fibrillation (HCC) 05/01/2015     Priority: High   • Essential hypertension, benign 08/19/2009     Priority: High   • Hard of hearing 05/03/2011     Priority: Medium   • Asthma, mild intermittent, well-controlled      Priority: Medium   • GERD (gastroesophageal reflux disease)      Priority: Medium   • Heart murmur      Priority: Medium   • COPD (chronic obstructive pulmonary disease) ([facility])      Priority: Medium   • B12 deficiency 05/03/2011     Priority: Low   • Mixed  incontinence urge and stress      Priority: Low   • MDD (major depressive disorder), recurrent episode, moderate (HCC)      Priority: Low   • DJD (degenerative joint disease)      Priority: Low   • History of vulvar dysplasia      Priority: Low   • Hypothyroidism 08/19/2009     Priority: Low   • Breast pain, right 10/04/2019   • Poor balance 02/01/2019   • Mild intermittent asthma without complication 07/19/2017   • Osteoporosis 09/14/2016   • Seasonal allergies 09/14/2016   • Prediabetes 09/14/2016   • SEA on CPAP 07/07/2016       Past Surgical History:  Past Surgical History:   Procedure Laterality Date   • GASTROSCOPY WITH BIOPSY N/A 5/7/2015    Procedure: GASTROSCOPY WITH BIOPSY;  Surgeon: Travis Jones M.D.;  Location: [facility];  Service:    • COLONOSCOPY WITH BIOPSY N/A 5/7/2015    Procedure: COLONOSCOPY WITH BIOPSY;  Surgeon: Travis Jones M.D.;  Location: [facility];  Service:    • HYSTERECTOMY ROBOTIC  12/27/2013    Performed by Zoltan Salazar M.D. at [facility]   • CYSTOSCOPY STENT PLACEMENT  12/27/2013    Performed by Zoltan Salazar M.D. at [facility]   • OTHER       Removal Rt sinus osteoma   • OTHER SURGICAL PROCEDURE      ORx3 cancer removal (vulvar Ca.)   • THYROIDECTOMY       Due to thyroid cancern   • TONSILLECTOMY          Allergies:  Ace inhibitors and Albuterol     Current Medications:    Current Outpatient Medications:   •  chlorhexidine (PERIDEX) 0.12 % Solution, RINSE WITH 15ML FOR 30 SECONDS AND SPIT USE TWICE DAILY AFTER BRUSHING AND FLOSSING ., Disp: , Rfl: 0  •  ezetimibe (ZETIA) 10 MG Tab, Take 10 mg by mouth., Disp: , Rfl: 3  •  tramadol (ULTRAM) 50 MG Tab, Take 1 Tab by mouth every four hours as needed for up to 5 days., Disp: 15 Tab, Rfl: 0  •  omeprazole (PRILOSEC) 40 MG delayed-release capsule, Take 1 Cap by mouth every day., Disp: 90 Cap, Rfl: 3  •  PARoxetine (PAXIL) 30 MG Tab, Take 1 Tab by mouth every day., Disp: 90  Tab, Rfl: 3  •  denosumab (PROLIA) 60 MG/ML Solution, Inject 1 mL as instructed every 6 months. subcutaneous, Disp: 1 mL, Rfl: 1  •  oxybutynin SR (DITROPAN-XL) 5 MG TABLET SR 24 HR, Take 1 Tab by mouth every day., Disp: 90 Tab, Rfl: 3  •  cyanocobalamin (VITAMIN B-12) 1000 MCG/ML Solution, 1,000 mcg by Intramuscular route every 7 days. On Wed, Disp: , Rfl:   •  cetirizine (ZYRTEC) 10 MG Tab, Take 1 Tab by mouth every day., Disp: , Rfl:   •  levothyroxine (SYNTHROID) 200 MCG Tab, Take 1 Tab by mouth Every morning on an empty stomach., Disp: , Rfl:     Social History:  Social History     Socioeconomic History   • Marital status:      Spouse name: Not on file   • Number of children: Not on file   • Years of education: Not on file   • Highest education level: Not on file   Occupational History   • Not on file   Social Needs   • Financial resource strain: Not on file   • Food insecurity:     Worry: Not on file     Inability: Not on file   • Transportation needs:     Medical: Not on file     Non-medical: Not on file   Tobacco Use   • Smoking status: Passive Smoke Exposure - Never Smoker   • Smokeless tobacco: Never Used   Substance and Sexual Activity   • Alcohol use: Yes     Alcohol/week: 0.6 oz     Types: 1 Standard drinks or equivalent per week     Comment: rarely   • Drug use: No   • Sexual activity: Never   Lifestyle   • Physical activity:     Days per week: Not on file     Minutes per session: Not on file   • Stress: Not on file   Relationships   • Social connections:     Talks on phone: Not on file     Gets together: Not on file     Attends [employer] service: Not on file     Active member of club or organization: Not on file     Attends meetings of clubs or organizations: Not on file     Relationship status: Not on file   • Intimate partner violence:     Fear of current or ex partner: Not on file     Emotionally abused: Not on file     Physically abused: Not on file     Forced sexual activity: Not on file  "  Other Topics Concern   • Not on file   Social History Narrative   • Not on file        PHYSICAL EXAM:   Vital signs: /70 (BP Location: Left arm, Patient Position: Sitting)   Pulse 72   Ht 1.499 m (4' 11.02\")   Wt 52.6 kg (116 lb)   LMP  (LMP Unknown)   SpO2 92%   BMI 23.42 kg/m²   GENERAL: Elderly female in no apparent distress.  She has marked kyphosis  EYE: No ocular and eyelid asymmetry, Anicteric sclerae,  PERRL  HENT: Hearing grossly intact, Normocephalic, atraumatic. Pink, moist mucous membranes, No exudate  NECK: Supple. Trachea midline. thyroid is normal in size without nodules or tenderness  CARDIOVASCULAR: Regular rate and rhythm. No murmurs, rubs, or gallops.   BACK: Palpable gibbus deformity at T12 region, no vertebral tenderness  LUNGS: Clear to auscultation bilaterally   ABDOMEN: Soft, nontender with positive bowel sounds.   EXTREMITIES: No clubbing, cyanosis, or edema.   NEUROLOGICAL: Cranial nerves II-XII are grossly intact   Symmetric reflexes at the patella no proximal muscle weakness, No visible tremor with both outstretched hands  LYMPH: No cervical, supraclavicular,  adenopathy palpated.   SKIN: No rashes, lesions. Turgor is normal.    Labs:  Lab Results   Component Value Date/Time    WBC 6.8 11/04/2019 04:33 PM    WBC 5.5 03/04/2011 11:11 AM    RBC 4.10 (L) 11/04/2019 04:33 PM    RBC 4.27 03/04/2011 11:11 AM    HEMOGLOBIN 11.8 (L) 11/04/2019 04:33 PM    MCV 86.8 11/04/2019 04:33 PM    MCV 91 03/04/2011 11:11 AM    MCH 28.8 11/04/2019 04:33 PM    MCH 30.9 03/04/2011 11:11 AM    MCHC 33.1 (L) 11/04/2019 04:33 PM    RDW 40.2 11/04/2019 04:33 PM    RDW 13.8 03/04/2011 11:11 AM    MPV 9.2 11/04/2019 04:33 PM       Lab Results   Component Value Date/Time    SODIUM 136 11/04/2019 04:33 PM    POTASSIUM 4.2 11/04/2019 04:33 PM    CHLORIDE 102 11/04/2019 04:33 PM    CO2 22 11/04/2019 04:33 PM    ANION 12.0 (H) 11/04/2019 04:33 PM    GLUCOSE 105 (H) 11/04/2019 04:33 PM    BUN 12 11/04/2019 " 04:33 PM    CREATININE 0.68 11/04/2019 04:33 PM    CREATININE 0.63 04/30/2013 12:03 PM    CALCIUM 9.1 11/04/2019 04:33 PM    ASTSGOT 12 11/04/2019 04:33 PM    ALTSGPT 13 11/04/2019 04:33 PM    TBILIRUBIN 0.3 11/04/2019 04:33 PM    ALBUMIN 3.7 11/04/2019 04:33 PM    TOTPROTEIN 6.9 11/04/2019 04:33 PM    GLOBULIN 3.2 11/04/2019 04:33 PM    AGRATIO 1.2 11/04/2019 04:33 PM       Lab Results   Component Value Date/Time    TSHULTRASEN <0.005 (L) 05/14/2019 1059     Lab Results   Component Value Date/Time    FREEDIR 2.47 (H) 09/27/2016 0953     Lab Results   Component Value Date/Time    FREET3 3.99 05/14/2019 1059           Imaging: Please refer to bone density dated September 16, 2014        ASSESSMENT/PLAN:     1. Age-related osteoporosis with current pathological fracture with routine healing, subsequent encounter  Unstable, reviewed the high risk of fracture with osteoporosis and reviewed its pathogenesis and its therapy.  It is highly unusual that her TSH has been maintained at very low levels by another physician.  I explained to the patient suppressed TSH levels is associated with increased bone loss.  I will adjust her thyroid hormone dose (see below).    I explained to the patient the increased risk of rebound bone loss with interruption in Denosumab therapy.  Studies have shown that there is an increased risk of vertebral fractures after Denosumab withdrawal usually 8 to 16 months after the last dose.  It is notable in the patient's history that she has had prolonged gaps in her denosumab therapy which could have also predisposed her to bone loss.    Because of her history of vertebral compression fracture,  she is a good candidate for anabolic therapy such as Forteo or Tymlos.  However before we embark and start her on such therapy we need to make sure that she does not have secondary hyperparathyroidism or primary hyperparathyroidism.    Today I am going to check her calcium, vitamin D, intact PTH, 24 urine  calcium, SPEP, UPEP and other labs    We will plan for follow-up in 2 weeks to go over her test results face-to-face and determine if she can start anabolic therapy for osteoporosis      2. Hypothyroidism, unspecified type  Uncontrolled, her last labs were over 6 months ago when she is overdue for a repeat of her TSH and free T4 levels.    I recommend obtaining a TSH and free T4 and free T3 level today because she is on combination therapy with Synthroid and Cytomel.    It is notable that her TSH levels have been maintained very low by another doctor which in my opinion is inappropriate given her history of atrial fibrillation and osteoporosis and also because of her advanced age.    I plan to adjust her thyroid hormone after I review her test results and discussed the findings with her face-to-face.    3. Compression fracture of T12 vertebra with routine healing  Patient has a vertebral compression fracture which indicates poor quality of bone I am conducting a work-up for secondary etiologies of bone loss a day.  Please see the orders and discussion above.          Return in about 2 weeks (around 11/20/2019).         Thank you kindly for allowing me to participate in the endocrine care plan for this patient.    Toñito Luevano MD, [facility], [facility]  11/06/19    CC:   Ezequiel Grewal M.D.

## 2019-11-08 ENCOUNTER — OFFICE VISIT (OUTPATIENT)
Dept: MEDICAL GROUP | Facility: MEDICAL CENTER | Age: 84
End: 2019-11-08
Payer: MEDICARE

## 2019-11-08 VITALS
DIASTOLIC BLOOD PRESSURE: 92 MMHG | TEMPERATURE: 99.9 F | HEIGHT: 59 IN | HEART RATE: 100 BPM | OXYGEN SATURATION: 96 % | SYSTOLIC BLOOD PRESSURE: 156 MMHG | BODY MASS INDEX: 23.43 KG/M2

## 2019-11-08 DIAGNOSIS — S22.080D COMPRESSION FRACTURE OF T12 VERTEBRA WITH ROUTINE HEALING: ICD-10-CM

## 2019-11-08 PROCEDURE — 99214 OFFICE O/P EST MOD 30 MIN: CPT | Performed by: FAMILY MEDICINE

## 2019-11-08 RX ORDER — TRAMADOL HYDROCHLORIDE 50 MG/1
50 TABLET ORAL 3 TIMES DAILY PRN
Qty: 90 TAB | Refills: 0 | Status: SHIPPED | OUTPATIENT
Start: 2019-11-08 | End: 2019-12-08

## 2019-11-08 NOTE — ASSESSMENT & PLAN NOTE
She had a fall at a family reunion in [place]. Her foot got caught between 2 chairs when getting up from a seat. She went down on a carpeted surface. Fell on her right side, hit right shoulder, hip and right side of head.  17 days later lower back pain increased so she was taken to the ER. Compression fracture T12, with associated pain in the region of T12.  She has been using tramadol about 3 tablets daily, especially to help her sleep.  She is having most pain when trying to get up out of bed.

## 2019-11-09 NOTE — PROGRESS NOTES
Subjective:   Tania Aguilar is a 84 y.o. female here today for compression fracture    Compression fracture of T12 vertebra with routine healing  She had a fall at a family reunion in [place]. Her foot got caught between 2 chairs when getting up from a seat. She went down on a carpeted surface. Fell on her right side, hit right shoulder, hip and right side of head.  17 days later lower back pain increased so she was taken to the ER. Compression fracture T12, with associated pain in the region of T12.  She has been using tramadol about 3 tablets daily, especially to help her sleep.  She is having most pain when trying to get up out of bed.         Current medicines (including changes today)  Current Outpatient Medications   Medication Sig Dispense Refill   • tramadol (ULTRAM) 50 MG Tab Take 1 Tab by mouth 3 times a day as needed for Severe Pain for up to 30 days. 90 Tab 0   • chlorhexidine (PERIDEX) 0.12 % Solution RINSE WITH 15ML FOR 30 SECONDS AND SPIT USE TWICE DAILY AFTER BRUSHING AND FLOSSING .  0   • ezetimibe (ZETIA) 10 MG Tab Take 10 mg by mouth.  3   • omeprazole (PRILOSEC) 40 MG delayed-release capsule Take 1 Cap by mouth every day. 90 Cap 3   • PARoxetine (PAXIL) 30 MG Tab Take 1 Tab by mouth every day. 90 Tab 3   • denosumab (PROLIA) 60 MG/ML Solution Inject 1 mL as instructed every 6 months. subcutaneous 1 mL 1   • oxybutynin SR (DITROPAN-XL) 5 MG TABLET SR 24 HR Take 1 Tab by mouth every day. 90 Tab 3   • cyanocobalamin (VITAMIN B-12) 1000 MCG/ML Solution 1,000 mcg by Intramuscular route every 7 days. On Wed     • cetirizine (ZYRTEC) 10 MG Tab Take 1 Tab by mouth every day.       No current facility-administered medications for this visit.      She  has a past medical history of Allergic rhinitis, Arthritis, ASTHMA, Atrial fibrillation (HCC) (January 2015), B12 deficiency, Cancer ([facility]), CHI (closed head injury), COPD (chronic obstructive pulmonary disease) ([facility]), Degenerative joint disease,  "Dental disorder, Depression, GERD (gastroesophageal reflux disease), Hemorrhoids, [place] (hard of hearing) ( ), HSV-1 infection, Hypertension, Hypothyroidism, [facility] HOME, Meningitis, Osteoporosis, Shingles, Sinusitis due to Moraxella catarrhalis, Stress incontinence, Thyroid cancer (HCC), and Vulvar cancer (HCC). She also has no past medical history of Breast cancer (HCC).    ROS   No neuropathy, no fever       Objective:     /92 (BP Location: Right arm, Patient Position: Sitting)   Pulse 100   Temp 37.7 °C (99.9 °F)   Ht 1.499 m (4' 11\")   SpO2 96%  Body mass index is 23.43 kg/m².   Physical Exam:  Constitutional: Alert, no distress.  Skin: Warm, dry, good turgor, no rashes in visible areas.  Eye: Equal, round and reactive, conjunctiva clear, lids normal.  Psych: Alert and oriented x3, normal affect and mood.        Assessment and Plan:   The following treatment plan was discussed    1. Compression fracture of T12 vertebra with routine healing  New problem.  Prescription for tramadol over the next 1 month.  Follow-up in 1 month to review pain level.  - tramadol (ULTRAM) 50 MG Tab; Take 1 Tab by mouth 3 times a day as needed for Severe Pain for up to 30 days.  Dispense: 90 Tab; Refill: 0  - Consent for Opiate Prescription      Followup: Return in about 4 weeks (around 12/6/2019) for Pain medication refill.         "

## 2019-11-14 ENCOUNTER — HOSPITAL ENCOUNTER (OUTPATIENT)
Dept: LAB | Facility: MEDICAL CENTER | Age: 84
End: 2019-11-14
Attending: INTERNAL MEDICINE
Payer: MEDICARE

## 2019-11-14 DIAGNOSIS — M80.00XD AGE-RELATED OSTEOPOROSIS WITH CURRENT PATHOLOGICAL FRACTURE WITH ROUTINE HEALING, SUBSEQUENT ENCOUNTER: ICD-10-CM

## 2019-11-14 DIAGNOSIS — E03.9 HYPOTHYROIDISM, UNSPECIFIED TYPE: ICD-10-CM

## 2019-11-14 DIAGNOSIS — S22.080D COMPRESSION FRACTURE OF T12 VERTEBRA WITH ROUTINE HEALING: ICD-10-CM

## 2019-11-14 LAB
25(OH)D3 SERPL-MCNC: 27 NG/ML (ref 30–100)
ALBUMIN SERPL BCP-MCNC: 3.8 G/DL (ref 3.2–4.9)
ALBUMIN/GLOB SERPL: 1.3 G/DL
ALP SERPL-CCNC: 99 U/L (ref 30–99)
ALT SERPL-CCNC: 11 U/L (ref 2–50)
ANION GAP SERPL CALC-SCNC: 7 MMOL/L (ref 0–11.9)
AST SERPL-CCNC: 13 U/L (ref 12–45)
BASOPHILS # BLD AUTO: 0.6 % (ref 0–1.8)
BASOPHILS # BLD: 0.03 K/UL (ref 0–0.12)
BILIRUB SERPL-MCNC: 0.4 MG/DL (ref 0.1–1.5)
BUN SERPL-MCNC: 17 MG/DL (ref 8–22)
CALCIUM SERPL-MCNC: 9.3 MG/DL (ref 8.5–10.5)
CHLORIDE SERPL-SCNC: 105 MMOL/L (ref 96–112)
CO2 SERPL-SCNC: 26 MMOL/L (ref 20–33)
CREAT SERPL-MCNC: 0.68 MG/DL (ref 0.5–1.4)
EOSINOPHIL # BLD AUTO: 0.12 K/UL (ref 0–0.51)
EOSINOPHIL NFR BLD: 2.6 % (ref 0–6.9)
ERYTHROCYTE [DISTWIDTH] IN BLOOD BY AUTOMATED COUNT: 42 FL (ref 35.9–50)
GLOBULIN SER CALC-MCNC: 3 G/DL (ref 1.9–3.5)
GLUCOSE SERPL-MCNC: 107 MG/DL (ref 65–99)
HCT VFR BLD AUTO: 39.3 % (ref 37–47)
HGB BLD-MCNC: 12.9 G/DL (ref 12–16)
IMM GRANULOCYTES # BLD AUTO: 0.01 K/UL (ref 0–0.11)
IMM GRANULOCYTES NFR BLD AUTO: 0.2 % (ref 0–0.9)
LYMPHOCYTES # BLD AUTO: 1.67 K/UL (ref 1–4.8)
LYMPHOCYTES NFR BLD: 35.8 % (ref 22–41)
MCH RBC QN AUTO: 29.6 PG (ref 27–33)
MCHC RBC AUTO-ENTMCNC: 32.8 G/DL (ref 33.6–35)
MCV RBC AUTO: 90.1 FL (ref 81.4–97.8)
MONOCYTES # BLD AUTO: 0.38 K/UL (ref 0–0.85)
MONOCYTES NFR BLD AUTO: 8.2 % (ref 0–13.4)
NEUTROPHILS # BLD AUTO: 2.45 K/UL (ref 2–7.15)
NEUTROPHILS NFR BLD: 52.6 % (ref 44–72)
NRBC # BLD AUTO: 0 K/UL
NRBC BLD-RTO: 0 /100 WBC
PLATELET # BLD AUTO: 407 K/UL (ref 164–446)
PMV BLD AUTO: 10.2 FL (ref 9–12.9)
POTASSIUM SERPL-SCNC: 4.3 MMOL/L (ref 3.6–5.5)
PROT SERPL-MCNC: 6.8 G/DL (ref 6–8.2)
PTH-INTACT SERPL-MCNC: 22.1 PG/ML (ref 14–72)
RBC # BLD AUTO: 4.36 M/UL (ref 4.2–5.4)
SODIUM SERPL-SCNC: 138 MMOL/L (ref 135–145)
T3FREE SERPL-MCNC: 3.75 PG/ML (ref 2.4–4.2)
T4 FREE SERPL-MCNC: 2.08 NG/DL (ref 0.53–1.43)
THYROPEROXIDASE AB SERPL-ACNC: 0.3 IU/ML (ref 0–9)
TSH SERPL DL<=0.005 MIU/L-ACNC: <0.005 UIU/ML (ref 0.38–5.33)
WBC # BLD AUTO: 4.7 K/UL (ref 4.8–10.8)

## 2019-11-14 PROCEDURE — 82784 ASSAY IGA/IGD/IGG/IGM EACH: CPT

## 2019-11-14 PROCEDURE — 85025 COMPLETE CBC W/AUTO DIFF WBC: CPT

## 2019-11-14 PROCEDURE — 80053 COMPREHEN METABOLIC PANEL: CPT

## 2019-11-14 PROCEDURE — 84156 ASSAY OF PROTEIN URINE: CPT

## 2019-11-14 PROCEDURE — 82523 COLLAGEN CROSSLINKS: CPT

## 2019-11-14 PROCEDURE — 86334 IMMUNOFIX E-PHORESIS SERUM: CPT

## 2019-11-14 PROCEDURE — 86376 MICROSOMAL ANTIBODY EACH: CPT

## 2019-11-14 PROCEDURE — 84439 ASSAY OF FREE THYROXINE: CPT

## 2019-11-14 PROCEDURE — 83516 IMMUNOASSAY NONANTIBODY: CPT

## 2019-11-14 PROCEDURE — 82306 VITAMIN D 25 HYDROXY: CPT

## 2019-11-14 PROCEDURE — 84481 FREE ASSAY (FT-3): CPT

## 2019-11-14 PROCEDURE — 84155 ASSAY OF PROTEIN SERUM: CPT

## 2019-11-14 PROCEDURE — 84443 ASSAY THYROID STIM HORMONE: CPT

## 2019-11-14 PROCEDURE — 83883 ASSAY NEPHELOMETRY NOT SPEC: CPT

## 2019-11-14 PROCEDURE — 84165 PROTEIN E-PHORESIS SERUM: CPT

## 2019-11-14 PROCEDURE — 83970 ASSAY OF PARATHORMONE: CPT

## 2019-11-14 PROCEDURE — 36415 COLL VENOUS BLD VENIPUNCTURE: CPT

## 2019-11-15 LAB
COLLAGEN NTX/CREAT UR-SRTO: 69
CREAT 24H UR-MCNC: 78 MG/DL

## 2019-11-16 LAB
ALBUMIN 24H UR QL ELPH: DETECTED
ALPHA1 GLOB 24H UR QL ELPH: DETECTED
ALPHA2 GLOB 24H UR QL ELPH: DETECTED
B-GLOBULIN UR QL ELPH: DETECTED
COLLECT DURATION TIME SPEC: ABNORMAL H
GAMMA GLOB UR QL ELPH: DETECTED
IGA SERPL-MCNC: 20 MG/DL (ref 68–408)
INTERPRETATION UR IFE-IMP: ABNORMAL
KAPPA LC FREE UR-MCNC: 3.12 MG/DL (ref 0.14–2.42)
KAPPA LC FREE/LAMBDA FREE UR: 62.4 RATIO (ref 2.04–10.37)
LAMBDA LC FREE UR-MCNC: 0.05 MG/DL (ref 0.02–0.67)
PROT 24H UR-MRATE: ABNORMAL MG/D (ref 10–140)
TOTAL VOLUME 1105: ABNORMAL ML

## 2019-11-18 ENCOUNTER — HOSPITAL ENCOUNTER (EMERGENCY)
Facility: MEDICAL CENTER | Age: 84
End: 2019-11-18
Attending: EMERGENCY MEDICINE
Payer: MEDICARE

## 2019-11-18 ENCOUNTER — APPOINTMENT (OUTPATIENT)
Dept: RADIOLOGY | Facility: MEDICAL CENTER | Age: 84
End: 2019-11-18
Attending: EMERGENCY MEDICINE
Payer: MEDICARE

## 2019-11-18 ENCOUNTER — HOSPITAL ENCOUNTER (OUTPATIENT)
Facility: MEDICAL CENTER | Age: 84
End: 2019-11-18
Attending: INTERNAL MEDICINE
Payer: MEDICARE

## 2019-11-18 VITALS
OXYGEN SATURATION: 95 % | HEIGHT: 60 IN | RESPIRATION RATE: 18 BRPM | TEMPERATURE: 98.3 F | BODY MASS INDEX: 22.07 KG/M2 | WEIGHT: 112.43 LBS | DIASTOLIC BLOOD PRESSURE: 78 MMHG | SYSTOLIC BLOOD PRESSURE: 152 MMHG | HEART RATE: 88 BPM

## 2019-11-18 DIAGNOSIS — K59.00 CONSTIPATION, UNSPECIFIED CONSTIPATION TYPE: ICD-10-CM

## 2019-11-18 DIAGNOSIS — R10.10 PAIN OF UPPER ABDOMEN: ICD-10-CM

## 2019-11-18 DIAGNOSIS — E03.9 HYPOTHYROIDISM, UNSPECIFIED TYPE: ICD-10-CM

## 2019-11-18 DIAGNOSIS — S22.080D COMPRESSION FRACTURE OF T12 VERTEBRA WITH ROUTINE HEALING: ICD-10-CM

## 2019-11-18 DIAGNOSIS — M80.00XD AGE-RELATED OSTEOPOROSIS WITH CURRENT PATHOLOGICAL FRACTURE WITH ROUTINE HEALING, SUBSEQUENT ENCOUNTER: ICD-10-CM

## 2019-11-18 LAB
ALBUMIN SERPL BCP-MCNC: 3.9 G/DL (ref 3.2–4.9)
ALBUMIN SERPL ELPH-MCNC: 3.95 G/DL (ref 3.75–5.01)
ALBUMIN/GLOB SERPL: 1.2 G/DL
ALP SERPL-CCNC: 104 U/L (ref 30–99)
ALPHA1 GLOB SERPL ELPH-MCNC: 0.34 G/DL (ref 0.19–0.46)
ALPHA2 GLOB SERPL ELPH-MCNC: 0.8 G/DL (ref 0.48–1.05)
ALT SERPL-CCNC: 12 U/L (ref 2–50)
ANION GAP SERPL CALC-SCNC: 14 MMOL/L (ref 0–11.9)
AST SERPL-CCNC: 12 U/L (ref 12–45)
B-GLOBULIN SERPL ELPH-MCNC: 0.61 G/DL (ref 0.48–1.1)
BASOPHILS # BLD AUTO: 0.6 % (ref 0–1.8)
BASOPHILS # BLD: 0.04 K/UL (ref 0–0.12)
BILIRUB SERPL-MCNC: 0.3 MG/DL (ref 0.1–1.5)
BUN SERPL-MCNC: 12 MG/DL (ref 8–22)
CALCIUM SERPL-MCNC: 9.1 MG/DL (ref 8.4–10.2)
CHLORIDE SERPL-SCNC: 99 MMOL/L (ref 96–112)
CO2 SERPL-SCNC: 22 MMOL/L (ref 20–33)
CREAT SERPL-MCNC: 0.72 MG/DL (ref 0.5–1.4)
EOSINOPHIL # BLD AUTO: 0.13 K/UL (ref 0–0.51)
EOSINOPHIL NFR BLD: 1.9 % (ref 0–6.9)
ERYTHROCYTE [DISTWIDTH] IN BLOOD BY AUTOMATED COUNT: 40.8 FL (ref 35.9–50)
GAMMA GLOB SERPL ELPH-MCNC: 1.2 G/DL (ref 0.62–1.51)
GLIADIN PEPTIDE+TTG IGA+IGG SER QL IA: 4 UNITS (ref 0–19)
GLOBULIN SER CALC-MCNC: 3.2 G/DL (ref 1.9–3.5)
GLUCOSE SERPL-MCNC: 96 MG/DL (ref 65–99)
HCT VFR BLD AUTO: 38 % (ref 37–47)
HGB BLD-MCNC: 12.8 G/DL (ref 12–16)
IGG SERPL-MCNC: 1560 MG/DL (ref 768–1632)
IGM SERPL-MCNC: 20 MG/DL (ref 35–263)
IMM GRANULOCYTES # BLD AUTO: 0.02 K/UL (ref 0–0.11)
IMM GRANULOCYTES NFR BLD AUTO: 0.3 % (ref 0–0.9)
INTERPRETATION SERPL IFE-IMP: NORMAL
LIPASE SERPL-CCNC: 9 U/L (ref 7–58)
LYMPHOCYTES # BLD AUTO: 2.45 K/UL (ref 1–4.8)
LYMPHOCYTES NFR BLD: 35.4 % (ref 22–41)
MCH RBC QN AUTO: 29.3 PG (ref 27–33)
MCHC RBC AUTO-ENTMCNC: 33.7 G/DL (ref 33.6–35)
MCV RBC AUTO: 87 FL (ref 81.4–97.8)
MONOCLON BAND OBS SERPL: NORMAL
MONOCYTES # BLD AUTO: 0.53 K/UL (ref 0–0.85)
MONOCYTES NFR BLD AUTO: 7.7 % (ref 0–13.4)
NEUTROPHILS # BLD AUTO: 3.75 K/UL (ref 2–7.15)
NEUTROPHILS NFR BLD: 54.1 % (ref 44–72)
NRBC # BLD AUTO: 0 K/UL
NRBC BLD-RTO: 0 /100 WBC
PATHOLOGY STUDY: NORMAL
PLATELET # BLD AUTO: 356 K/UL (ref 164–446)
PMV BLD AUTO: 9.6 FL (ref 9–12.9)
POTASSIUM SERPL-SCNC: 4.3 MMOL/L (ref 3.6–5.5)
PROT SERPL-MCNC: 6.9 G/DL (ref 6.3–8.2)
PROT SERPL-MCNC: 7.1 G/DL (ref 6–8.2)
RBC # BLD AUTO: 4.37 M/UL (ref 4.2–5.4)
SODIUM SERPL-SCNC: 135 MMOL/L (ref 135–145)
TROPONIN T SERPL-MCNC: 8 NG/L (ref 6–19)
WBC # BLD AUTO: 6.9 K/UL (ref 4.8–10.8)

## 2019-11-18 PROCEDURE — 80053 COMPREHEN METABOLIC PANEL: CPT

## 2019-11-18 PROCEDURE — 81050 URINALYSIS VOLUME MEASURE: CPT

## 2019-11-18 PROCEDURE — 84484 ASSAY OF TROPONIN QUANT: CPT

## 2019-11-18 PROCEDURE — 99284 EMERGENCY DEPT VISIT MOD MDM: CPT

## 2019-11-18 PROCEDURE — 700117 HCHG RX CONTRAST REV CODE 255: Performed by: EMERGENCY MEDICINE

## 2019-11-18 PROCEDURE — 82570 ASSAY OF URINE CREATININE: CPT

## 2019-11-18 PROCEDURE — 85025 COMPLETE CBC W/AUTO DIFF WBC: CPT

## 2019-11-18 PROCEDURE — 700101 HCHG RX REV CODE 250: Performed by: EMERGENCY MEDICINE

## 2019-11-18 PROCEDURE — 74177 CT ABD & PELVIS W/CONTRAST: CPT

## 2019-11-18 PROCEDURE — 82340 ASSAY OF CALCIUM IN URINE: CPT

## 2019-11-18 PROCEDURE — 83690 ASSAY OF LIPASE: CPT

## 2019-11-18 RX ORDER — ENEMA 19; 7 G/133ML; G/133ML
1 ENEMA RECTAL ONCE
Status: COMPLETED | OUTPATIENT
Start: 2019-11-18 | End: 2019-11-18

## 2019-11-18 RX ORDER — LEVOTHYROXINE SODIUM 0.2 MG/1
200 TABLET ORAL
Status: SHIPPED | COMMUNITY
End: 2020-03-04

## 2019-11-18 RX ADMIN — SODIUM PHOSPHATE 133 ML: 7; 19 ENEMA RECTAL at 17:09

## 2019-11-18 RX ADMIN — IOHEXOL 80 ML: 350 INJECTION, SOLUTION INTRAVENOUS at 15:43

## 2019-11-18 NOTE — ED PROVIDER NOTES
ED Provider Note    CHIEF COMPLAINT  Chief Complaint   Patient presents with   • Constipation     since last tues   • Abdominal Pain       HPI  Tania Aguilar is a 84 y.o. female here for evaluation of abdominal pain.  The pt states that she has took a 'fall' last week, and was noted to have a T 12 fracture. The pt has been taking pain medications, and states that these are likely constipating her. The pt was supposed to be taking Miralax as well, but she has not been doing this, because she feels as though it gives her 'diarrhea.'  She has no vomiting, no fever no chills.  She has no cp, no sob.  She states her abdominal pain is left sided, and radiates around to there left flank. She has no dysuria, urgency or frequency.  No headache.  The pain is described as a 'ache' and is intermittent. She has not had a bowel movement in approx 4-5 days. Nothing alleviates or exacerbates her symptoms.       ROS  See HPI for further details, o/w negative.     PAST MEDICAL HISTORY   has a past medical history of Allergic rhinitis, Arthritis, ASTHMA, Atrial fibrillation ([facility]) (January 2015), B12 deficiency, Cancer ([facility]), CHI (closed head injury), COPD (chronic obstructive pulmonary disease) ([facility]), Degenerative joint disease, Dental disorder, Depression, GERD (gastroesophageal reflux disease), Hemorrhoids, [place] (hard of hearing) ( ), HSV-1 infection, Hypertension, Hypothyroidism, MEDICAL HOME, Meningitis, Osteoporosis, Shingles, Sinusitis due to Moraxella catarrhalis, Stress incontinence, Thyroid cancer ([facility]), and Vulvar cancer ([facility]).    SOCIAL HISTORY  Social History     Tobacco Use   • Smoking status: Passive Smoke Exposure - Never Smoker   • Smokeless tobacco: Never Used   Substance and Sexual Activity   • Alcohol use: Yes     Alcohol/week: 0.6 oz     Types: 1 Standard drinks or equivalent per week     Comment: rarely   • Drug use: No   • Sexual activity: Never       Family History  No bleeding disorders     SURGICAL  HISTORY   has a past surgical history that includes other surgical procedure; tonsillectomy; thyroidectomy ( ); other ( ); hysterectomy robotic (12/27/2013); cystoscopy stent placement (12/27/2013); gastroscopy with biopsy (N/A, 5/7/2015); and colonoscopy with biopsy (N/A, 5/7/2015).    CURRENT MEDICATIONS  Home Medications    **Home medications have not yet been reviewed for this encounter**         ALLERGIES  Allergies   Allergen Reactions   • Ace Inhibitors      Cough     • Albuterol      Whole body spasmed  Patient tolerates Formoterol (Symbicort) - takes at home       REVIEW OF SYSTEMS  See HPI for further details. Review of systems as above, otherwise all other systems are negative.     PHYSICAL EXAM  Constitutional: Well developed, well nourished. mild acute distress.  HEENT: Normocephalic, atraumatic. Posterior pharynx clear and moist.  Eyes:  EOMI. Normal sclera.  Neck: Supple, Full range of motion, nontender.  Chest/Pulmonary: clear to ausculation. Symmetrical expansion.   Cardio: Regular rate and rhythm with no murmur.   Abdomen: Soft, mild left lower and left upper tenderness.  No peritoneal signs. No guarding. No palpable masses.  Back: No CVA tenderness, nontender midline, no step offs.  Musculoskeletal: No deformity, no edema, neurovascular intact.   Neuro: Clear speech, appropriate, cooperative, cranial nerves II-XII grossly intact.  Psych: Normal mood and affect      Results for orders placed or performed during the hospital encounter of 11/18/19   CBC WITH DIFFERENTIAL   Result Value Ref Range    WBC 6.9 4.8 - 10.8 K/uL    RBC 4.37 4.20 - 5.40 M/uL    Hemoglobin 12.8 12.0 - 16.0 g/dL    Hematocrit 38.0 37.0 - 47.0 %    MCV 87.0 81.4 - 97.8 fL    MCH 29.3 27.0 - 33.0 pg    MCHC 33.7 33.6 - 35.0 g/dL    RDW 40.8 35.9 - 50.0 fL    Platelet Count 356 164 - 446 K/uL    MPV 9.6 9.0 - 12.9 fL    Neutrophils-Polys 54.10 44.00 - 72.00 %    Lymphocytes 35.40 22.00 - 41.00 %    Monocytes 7.70 0.00 - 13.40 %     Eosinophils 1.90 0.00 - 6.90 %    Basophils 0.60 0.00 - 1.80 %    Immature Granulocytes 0.30 0.00 - 0.90 %    Nucleated RBC 0.00 /100 WBC    Neutrophils (Absolute) 3.75 2.00 - 7.15 K/uL    Lymphs (Absolute) 2.45 1.00 - 4.80 K/uL    Monos (Absolute) 0.53 0.00 - 0.85 K/uL    Eos (Absolute) 0.13 0.00 - 0.51 K/uL    Baso (Absolute) 0.04 0.00 - 0.12 K/uL    Immature Granulocytes (abs) 0.02 0.00 - 0.11 K/uL    NRBC (Absolute) 0.00 K/uL   COMP METABOLIC PANEL   Result Value Ref Range    Sodium 135 135 - 145 mmol/L    Potassium 4.3 3.6 - 5.5 mmol/L    Chloride 99 96 - 112 mmol/L    Co2 22 20 - 33 mmol/L    Anion Gap 14.0 (H) 0.0 - 11.9    Glucose 96 65 - 99 mg/dL    Bun 12 8 - 22 mg/dL    Creatinine 0.72 0.50 - 1.40 mg/dL    Calcium 9.1 8.4 - 10.2 mg/dL    AST(SGOT) 12 12 - 45 U/L    ALT(SGPT) 12 2 - 50 U/L    Alkaline Phosphatase 104 (H) 30 - 99 U/L    Total Bilirubin 0.3 0.1 - 1.5 mg/dL    Albumin 3.9 3.2 - 4.9 g/dL    Total Protein 7.1 6.0 - 8.2 g/dL    Globulin 3.2 1.9 - 3.5 g/dL    A-G Ratio 1.2 g/dL   LIPASE   Result Value Ref Range    Lipase 9 7 - 58 U/L   TROPONIN   Result Value Ref Range    Troponin T 8 6 - 19 ng/L   ESTIMATED GFR   Result Value Ref Range    GFR If African American >60 >60 mL/min/1.73 m 2    GFR If Non African American >60 >60 mL/min/1.73 m 2     CT-ABDOMEN-PELVIS WITH   Final Result         1.  Stable mild acute/subacute T12 superior endplate compression fracture.   2.  Stable possible nodule in the right lung base abutting the diaphragm measuring approximately 8 mm.   3.  No significant new abnormality.                 PROCEDURES     MEDICAL RECORD  I have reviewed patient's medical record and pertinent results are listed.    COURSE & MEDICAL DECISION MAKING  I have reviewed any medical record information, laboratory studies and radiographic results as noted above.    6:11 PM  The patient had relief after her fleets enema.  She had 2 large bowel movements here, and states that the pressure and  discomfort are now gone.  She would like to go home.  She has no acute finding on her lab work, and no acute finding on CT scan.  Patient has a known T12 fracture already, and this is not new.  I will also have her follow-up with a lung nodule as well.      If you have had any blood pressure issues while here in the emergency department, please see your doctor for a further evaluation or work up.    Differential diagnoses include but not limited to: Constipation, small bowel obstruction, AAA, dissection    This patient presents with abdominal pain.  At this time, I have counseled the patient/family regarding their medications, pain control, and follow up.  They will continue their medications, if any, as prescribed.  They will return immediately for any worsening symptoms and/or any other medical concerns.  They will see their doctor, or contact the doctor provided, in 1-2 days for follow up.       FINAL IMPRESSION  Abdominal pain  Lung nodule.       Electronically signed by: Moshe Dumont, 11/18/2019 2:17 PM

## 2019-11-18 NOTE — ED NOTES
Pt and pt daughter given fresh warm blankets and updated on POC. Awaiting results of CT scan. Pt given oral swabs.

## 2019-11-18 NOTE — ED TRIAGE NOTES
Pt ambulates to triage with family  Chief Complaint   Patient presents with   • Constipation     since last tues   • Abdominal Pain   pt seen here last week for same  Pt reports last bowel movement on Tuesday and she stopped taking her ducolax  Pt A & 0 x 4, speech clear  Pt asked to wait in lobby, pt updated on triage process and pt asked to inform RN of any changes.

## 2019-11-19 ENCOUNTER — PATIENT OUTREACH (OUTPATIENT)
Dept: HEALTH INFORMATION MANAGEMENT | Facility: OTHER | Age: 84
End: 2019-11-19

## 2019-11-19 LAB
CREAT 24H UR-MSRATE: 536 MG/24 HR (ref 800–1800)
CREAT UR-MCNC: 33 MG/DL
SPECIMEN VOL UR: 1625 ML

## 2019-11-19 NOTE — ED NOTES
Pt given written and oral dc instructions. Pt verbalized understanding of all instructions given. All questions answered. VSS. IV removed. Pt given fu instructions and educated on s/s of when to return to the ER. Pt ambulating independently with daughter upon time of dc in stable condition.

## 2019-11-19 NOTE — PROGRESS NOTES
Called patient to follow up about a recent ED visit and to introduce myself as her SCP . She said that she did not want to talk at this time and would reach out when she was recovered. She did not want to take my number down at this time.    Attempt #1

## 2019-11-19 NOTE — ED NOTES
Fleet enema administered; Patient tolerated well; Commode at bedside; Call bell within reach; Monitoring continued; Patient/family instructed to push call light when ready to be assisted to commode.

## 2019-11-19 NOTE — ED NOTES
Pt with 2 successful BM. Pt reports pressure feeling much improved and asking when she can go home. Will notify ERP.

## 2019-11-20 ENCOUNTER — OFFICE VISIT (OUTPATIENT)
Dept: PULMONOLOGY | Facility: HOSPICE | Age: 84
End: 2019-11-20
Payer: MEDICARE

## 2019-11-20 VITALS
DIASTOLIC BLOOD PRESSURE: 64 MMHG | SYSTOLIC BLOOD PRESSURE: 126 MMHG | WEIGHT: 112 LBS | OXYGEN SATURATION: 98 % | BODY MASS INDEX: 21.87 KG/M2 | HEART RATE: 76 BPM

## 2019-11-20 DIAGNOSIS — G47.33 OSA (OBSTRUCTIVE SLEEP APNEA): ICD-10-CM

## 2019-11-20 DIAGNOSIS — J45.20 ASTHMA, MILD INTERMITTENT, WELL-CONTROLLED: ICD-10-CM

## 2019-11-20 DIAGNOSIS — J44.9 CHRONIC OBSTRUCTIVE PULMONARY DISEASE, UNSPECIFIED COPD TYPE (HCC): ICD-10-CM

## 2019-11-20 DIAGNOSIS — G47.33 OSA ON CPAP: ICD-10-CM

## 2019-11-20 LAB
CALCIUM 24H UR-MCNC: 4.3 MG/DL
CALCIUM 24H UR-MRATE: 70 MG/D
CALCIUM/CREAT 24H UR: 126 MG/G (ref 20–300)
COLLECT DURATION TIME SPEC: 24 HRS
CREAT 24H UR-MCNC: 34 MG/DL
CREAT 24H UR-MRATE: 552 MG/D (ref 400–1300)
SPECIMEN VOL ?TM UR: 1625 ML

## 2019-11-20 PROCEDURE — 99214 OFFICE O/P EST MOD 30 MIN: CPT | Performed by: NURSE PRACTITIONER

## 2019-11-20 RX ORDER — TRAZODONE HYDROCHLORIDE 50 MG/1
100 TABLET ORAL
Qty: 4 TAB | Refills: 0 | Status: SHIPPED | OUTPATIENT
Start: 2019-11-20 | End: 2019-12-13

## 2019-11-20 NOTE — PATIENT INSTRUCTIONS
1) Encourage to return back to CPAP at [id number]  2) Titration study with Trazodone Rx  3) Vaccines: Up to date with Pneumovax 23 in 2013, Prevnar 13  4) Return in about 2 months (around 1/20/2020) for sleep study results, if not sooner, follow up with PHIL Martinez.

## 2019-11-20 NOTE — PROGRESS NOTES
CC:  Here for f/u sleep and pulmonary issues as listed below    HPI:   Tania, who likes to be called Rossana,  presents today for follow up obstructive sleep apnea and asthma.  She returns after 2-year hiatus.  Past medical history of hypertension, hypothyroidism, atrial fibrillation, prediabetic, vitamin B12 deficiency, depression, heart murmur.    PSG from 6/2016 indicated an AHI of 67.1 and low oxygen of 84%.  Currently she is being treated with CPAP @ [id number].  Compliance download is not available.  She admits she has been struggling with it for the last 2 years waking with the mask off.  When she was last seen 2 years ago she was benefiting from the machine wearing it every single night for up to 8 hours.  She is currently sleeping 10 to 12 hours a night.  She does not take naps.  She falls asleep easily.  She understands the benefits of using the machine due to increased risk with untreated SEA.     She also has a history of Asthma with no complaints today. Please see last OV from 7/19/2017 for further details.  She travels to [place] to see her sisters.        Patient Active Problem List    Diagnosis Date Noted   • Atrial fibrillation (HCC) 05/01/2015     Priority: High   • Essential hypertension, benign 08/19/2009     Priority: High   • Hard of hearing 05/03/2011     Priority: Medium   • Asthma, mild intermittent, well-controlled      Priority: Medium   • GERD (gastroesophageal reflux disease)      Priority: Medium   • Heart murmur      Priority: Medium   • COPD (chronic obstructive pulmonary disease) ([facility])      Priority: Medium   • B12 deficiency 05/03/2011     Priority: Low   • Mixed incontinence urge and stress      Priority: Low   • MDD (major depressive disorder), recurrent episode, moderate ([facility])      Priority: Low   • DJD (degenerative joint disease)      Priority: Low   • History of vulvar dysplasia      Priority: Low   • Hypothyroidism 08/19/2009     Priority: Low   • Compression fracture of T12 vertebra  with routine healing 11/06/2019   • Breast pain, right 10/04/2019   • Poor balance 02/01/2019   • Osteoporosis 09/14/2016   • Seasonal allergies 09/14/2016   • Prediabetes 09/14/2016   • SEA on CPAP 07/07/2016       Past Medical History:   Diagnosis Date   • Allergic rhinitis    • Arthritis     Hands   • ASTHMA    • Atrial fibrillation (HCC) January 2015    New onset. Echocardiogram with normal LV size, mild concentric LVH, LVEF 65-70%. Normal RA and LA. Mild MR, mild TR, RVSP 35-40mmHg. June 2015: GI bleed, Coumadin stopped/contraindicated.   • B12 deficiency    • Cancer (HCC)     Vulvar; skin; thyroid   • CHI (closed head injury)    • COPD (chronic obstructive pulmonary disease) (HCC)    • Degenerative joint disease    • Dental disorder     One broken tooth   • Depression    • GERD (gastroesophageal reflux disease)    • Hemorrhoids    • [place] (hard of hearing)      Bilaterally   • HSV-1 infection    • Hypertension    • Hypothyroidism    • MEDICAL HOME    • Meningitis    • Osteoporosis    • Shingles    • Sinusitis due to Moraxella catarrhalis    • Stress incontinence    • Thyroid cancer (HCC)    • Vulvar cancer (HCC)        Past Surgical History:   Procedure Laterality Date   • GASTROSCOPY WITH BIOPSY N/A 5/7/2015    Procedure: GASTROSCOPY WITH BIOPSY;  Surgeon: Travis Jones M.D.;  Location: [facility];  Service:    • COLONOSCOPY WITH BIOPSY N/A 5/7/2015    Procedure: COLONOSCOPY WITH BIOPSY;  Surgeon: Travis Jones M.D.;  Location: [facility];  Service:    • HYSTERECTOMY ROBOTIC  12/27/2013    Performed by Zoltan Salazar M.D. at [facility]   • CYSTOSCOPY STENT PLACEMENT  12/27/2013    Performed by Zoltan Salazar M.D. at [facility]   • OTHER       Removal Rt sinus osteoma   • OTHER SURGICAL PROCEDURE      ORx3 cancer removal (vulvar Ca.)   • THYROIDECTOMY       Due to thyroid cancern   • TONSILLECTOMY         Family History   Problem Relation Age of Onset    • Heart Disease Mother         [email or web address]   • Cancer Father         throat Cancer   • Heart Disease Sister    • Cancer Brother         brain tumor   • Cancer Sister         breast   • Cancer Sister         lung   • Other Sister         myeloma       Social History     Socioeconomic History   • Marital status:      Spouse name: Not on file   • Number of children: Not on file   • Years of education: Not on file   • Highest education level: Not on file   Occupational History   • Not on file   Social Needs   • Financial resource strain: Not on file   • Food insecurity:     Worry: Not on file     Inability: Not on file   • Transportation needs:     Medical: Not on file     Non-medical: Not on file   Tobacco Use   • Smoking status: Passive Smoke Exposure - Never Smoker   • Smokeless tobacco: Never Used   Substance and Sexual Activity   • Alcohol use: Yes     Alcohol/week: 0.6 oz     Types: 1 Standard drinks or equivalent per week     Comment: rarely   • Drug use: No   • Sexual activity: Never   Lifestyle   • Physical activity:     Days per week: Not on file     Minutes per session: Not on file   • Stress: Not on file   Relationships   • Social connections:     Talks on phone: Not on file     Gets together: Not on file     Attends Zoroastrian service: Not on file     Active member of club or organization: Not on file     Attends meetings of clubs or organizations: Not on file     Relationship status: Not on file   • Intimate partner violence:     Fear of current or ex partner: Not on file     Emotionally abused: Not on file     Physically abused: Not on file     Forced sexual activity: Not on file   Other Topics Concern   • Not on file   Social History Narrative   • Not on file       Current Outpatient Medications   Medication Sig Dispense Refill   • traZODone (DESYREL) 50 MG Tab Take 2 Tabs by mouth at bedtime as needed.  May repeat 1 time. for Sleep. 4 Tab 0   • levothyroxine (SYNTHROID) 175 MCG Tab Take 175 mcg by  mouth Every morning on an empty stomach.     • tramadol (ULTRAM) 50 MG Tab Take 1 Tab by mouth 3 times a day as needed for Severe Pain for up to 30 days. 90 Tab 0   • ezetimibe (ZETIA) 10 MG Tab Take 10 mg by mouth every evening.  3   • omeprazole (PRILOSEC) 40 MG delayed-release capsule Take 1 Cap by mouth every day. 90 Cap 3   • PARoxetine (PAXIL) 30 MG Tab Take 1 Tab by mouth every day. 90 Tab 3   • oxybutynin SR (DITROPAN-XL) 5 MG TABLET SR 24 HR Take 1 Tab by mouth every day. 90 Tab 3   • cyanocobalamin (VITAMIN B-12) 1000 MCG/ML Solution 1,000 mcg by Intramuscular route every 7 days. On Wed     • cetirizine (ZYRTEC) 10 MG Tab Take 10 mg by mouth every morning.     • POLYETHYLENE GLYCOL 3350 PO Take 17 g by mouth 1 time daily as needed. Indications: Constipation       No current facility-administered medications for this visit.           Allergies: Ace inhibitors and Albuterol      ROS   Gen: Denies fever, chills, unintentional weight loss, fatigue, night sweats  E/N/T: Denies ear pain, nasal congestion  Resp:Denies Dyspnea, wheezing, cough, sputum production, hemoptysis  CV: Denies chest pain, chest tightness, palpitations, BLE edema  Sleep:Denies morning headache, insomnia, daytime somnolence, snoring, gasping for air, apnea  Neuro: Denies frequent headaches, weakness, dizziness  GI: Denies N/V, acid reflux/heartburn  See HPI.  All other systems reviewed and negative      Vital signs for this encounter:  Vitals:    11/20/19 1533   Weight: 50.8 kg (112 lb)   Weight % change since last entry.: 0 %   BP: 126/64   Pulse: 76                 Physical Exam:   Appearance: well developed, well nourished, no acute distress.  Eyes: PERRL, EOM intact, sclere white, conjunctiva moist.  Ears: no lesions or deformities.  Hearing: grossly intact.  Nose: no lesions or deformities.  Dentition: good dentition.  Oropharynx: tongue normal, posterior pharynx without erythema or exudate.  Neck: supple, trachea midline, no  masses.  Respiratory effort: no intercostal retractions or use of accessory muscles.  Lung auscultation: Bilateral diminished   Heart auscultation: no murmur, rub, or gallop.   Extremities: no cyanosis or edema.  Abdomen: soft, non-tender, no masses.  Gait and station: grossly normal   Digits and Nails: no clubbing, cyanosis, petechiae, or nodes.  Cranial nerves: grossly normal.  Motor: no focal deficits observed.  Skin: no rashes, lesions, or ulcers noted.  Orientation: oriented to time, place, and person.  Mood and affect: mood and affect appropriate, normal interaction with examiner.    Assessment   1. SEA (obstructive sleep apnea)  Polysomnography Titration   2. Asthma, mild intermittent, well-controlled     3. SEA on CPAP     4. Chronic obstructive pulmonary disease, unspecified COPD type (HCC)         Patient has struggled with the machine and mask for quite some time.  At this time will complete a titration study to further evaluate her needs and mask request.    PLAN  Patient Instructions   1) Encourage to return back to CPAP at [id number]  2) Titration study with Trazodone Rx  3) Vaccines: Up to date with Pneumovax 23 in 2013, Prevnar 13  4) Return in about 2 months (around 1/20/2020) for sleep study results, if not sooner, follow up with PHIL Martinez.

## 2019-11-21 ENCOUNTER — HOSPITAL ENCOUNTER (OUTPATIENT)
Dept: RADIOLOGY | Facility: MEDICAL CENTER | Age: 84
End: 2019-11-21
Attending: INTERNAL MEDICINE
Payer: MEDICARE

## 2019-11-21 DIAGNOSIS — S22.080D COMPRESSION FRACTURE OF T12 VERTEBRA WITH ROUTINE HEALING: ICD-10-CM

## 2019-11-21 DIAGNOSIS — M80.08XA AGE-RELATED OSTEOPOROSIS WITH CURRENT PATHOLOGICAL FRACTURE, VERTEBRA(E), INITIAL ENCOUNTER FOR FRACTURE (HCC): ICD-10-CM

## 2019-11-21 DIAGNOSIS — M80.00XD AGE-RELATED OSTEOPOROSIS WITH CURRENT PATHOLOGICAL FRACTURE WITH ROUTINE HEALING, SUBSEQUENT ENCOUNTER: ICD-10-CM

## 2019-11-21 DIAGNOSIS — E03.9 HYPOTHYROIDISM, UNSPECIFIED TYPE: ICD-10-CM

## 2019-11-21 PROCEDURE — 77080 DXA BONE DENSITY AXIAL: CPT

## 2019-12-04 ENCOUNTER — OFFICE VISIT (OUTPATIENT)
Dept: ENDOCRINOLOGY | Facility: MEDICAL CENTER | Age: 84
End: 2019-12-04
Payer: MEDICARE

## 2019-12-04 VITALS
OXYGEN SATURATION: 94 % | BODY MASS INDEX: 22.78 KG/M2 | HEART RATE: 70 BPM | HEIGHT: 59 IN | DIASTOLIC BLOOD PRESSURE: 74 MMHG | WEIGHT: 113 LBS | SYSTOLIC BLOOD PRESSURE: 122 MMHG

## 2019-12-04 DIAGNOSIS — M80.00XD AGE-RELATED OSTEOPOROSIS WITH CURRENT PATHOLOGICAL FRACTURE WITH ROUTINE HEALING, SUBSEQUENT ENCOUNTER: ICD-10-CM

## 2019-12-04 DIAGNOSIS — R80.3 BENCE JONES PROTEINURIA: ICD-10-CM

## 2019-12-04 DIAGNOSIS — E55.9 VITAMIN D DEFICIENCY: ICD-10-CM

## 2019-12-04 DIAGNOSIS — E03.9 HYPOTHYROIDISM, UNSPECIFIED TYPE: ICD-10-CM

## 2019-12-04 DIAGNOSIS — S22.080D COMPRESSION FRACTURE OF T12 VERTEBRA WITH ROUTINE HEALING: ICD-10-CM

## 2019-12-04 PROCEDURE — 99214 OFFICE O/P EST MOD 30 MIN: CPT | Performed by: INTERNAL MEDICINE

## 2019-12-04 RX ORDER — CHOLECALCIFEROL (VITAMIN D3) 25 MCG
25 TABLET ORAL 2 TIMES DAILY
COMMUNITY
End: 2021-01-01

## 2019-12-04 RX ORDER — ERGOCALCIFEROL 1.25 MG/1
50000 CAPSULE ORAL
Qty: 12 CAP | Refills: 1 | Status: SHIPPED | OUTPATIENT
Start: 2019-12-04 | End: 2020-08-24

## 2019-12-04 NOTE — PROGRESS NOTES
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 female here for follow up of the above medical issue.  In summary she has severe osteoporosis because of having a fragility fracture despite being on therapy with denosumab which was being given by another doctor.  Last bone density on November 21, 2019 showed the lowest T score of -2.7 for the left femur compatible with osteoporosis.  She was diagnosed with a T12 compression fracture on November 4, 2019 after a fall.    I am seeing her today because of discussion of labs related to her acute compression fracture and evaluation for bone loss.  She is iatrogenically hyperthyroid because she was being given too much thyroid hormone by another endocrinologist and she was taking levothyroxine 200+ Cytomel 25 mcg once a day and I have asked her to discontinue the Cytomel.  Her last TSH was over suppressed and undetectable with an elevated free T4 on November 2019.      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   Her 24-hour urine calcium was normal at 70      Since last visit patient reports feeling good.  She denies interval fracture.  She denies interval fall.  She denies interval nephrolithiasis.  She reports adherence to calcium supplementation recommendations.  She reports adherence to vitamin D supplementation.  Her activity level: no regular exercise, sedentary      Current osteoporosis pharmacologic treatment: Prolia.  She reports good medication adherence but admits to missing some  infusion doses        Patient's medications, allergies, and social histories were reviewed and updated as appropriate.      ROS:       CONS:     No fever, no chills   EYES:     No diplopia, no blurry vision   CV:           No chest pain, no palpitations   PULM:     No SOB, no cough, no hemoptysis.   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  2019-12: Vitamin D deficiency  2019-11: Compression fracture of T12 vertebra with routine healing  2019-10: Breast pain, right  2019-02: Poor balance  2017-07: Mild intermittent asthma without complication  2016-09: Osteoporosis  2016-09: Seasonal allergies  2016-09: Prediabetes  2016-07: SEA on CPAP  2015-11: Weight loss  2015-11: Diarrhea  2015-11: Neck stiffness  2015-08: Memory loss, short term  2015-06: COPD exacerbation ([facility])  2015-05: Anemia  2015-05: Acute gastrointestinal hemorrhage  2015-05: Abdominal pain, other specified site  2015-05: Nausea & vomiting  2015-05: Atrial fibrillation ([facility])  2015-05: Hyponatremia  2015-05: SBO (small bowel obstruction) ([facility])  2015-05: Supratherapeutic INR  2015-02: Chronic anticoagulation  2015-02: MEDICAL HOME  2015-01: COPD with exacerbation ([facility])  2015-01: Pneumonia  2015-01: Atrial fibrillation with RVR ([facility])  2013-12: Other and unspecified ovarian cyst  2011-06: Vertigo, benign positional  2011-05: B12 deficiency  2011-05: Hard of hearing  2009-08: Essential hypertension, benign  2009-08: Hypothyroidism  2009-08: Sinusitis due to Moraxella catarrhalis  2009-08: Allergic rhinitis  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  2009-07: Preventative health care  COPD (chronic obstructive pulmonary disease) ([facility])  Mixed incontinence urge and stress  CHI (closed head injury)  MDD (major depressive disorder), recurrent episode, moderate ([facility])  DJD (degenerative joint  "disease)  Asthma, mild intermittent, well-controlled  GERD (gastroesophageal reflux disease)  History of vulvar dysplasia  Heart murmur      Past Surgical History:  Past Surgical History:   Procedure Laterality Date   • GASTROSCOPY WITH BIOPSY N/A 5/7/2015    Procedure: GASTROSCOPY WITH BIOPSY;  Surgeon: Travis Jones M.D.;  Location: [facility];  Service:    • COLONOSCOPY WITH BIOPSY N/A 5/7/2015    Procedure: COLONOSCOPY WITH BIOPSY;  Surgeon: Travis Jones M.D.;  Location: [facility];  Service:    • HYSTERECTOMY ROBOTIC  12/27/2013    Performed by Zoltan Salazar M.D. at SURGERY [facility]   • CYSTOSCOPY STENT PLACEMENT  12/27/2013    Performed by Zoltan Salazar M.D. at [facility]   • OTHER       Removal Rt sinus osteoma   • OTHER SURGICAL PROCEDURE      ORx3 cancer removal (vulvar Ca.)   • THYROIDECTOMY       Due to thyroid cancern   • TONSILLECTOMY          Allergies:  Ace inhibitors and Albuterol     Social History:  Social History     Tobacco Use   • Smoking status: Passive Smoke Exposure - Never Smoker   • Smokeless tobacco: Never Used   Substance Use Topics   • Alcohol use: Yes     Alcohol/week: 0.6 oz     Types: 1 Standard drinks or equivalent per week     Comment: rarely   • Drug use: No        Family History:   family history includes Cancer in her brother, father, sister, and sister; Heart Disease in her mother and sister; Other in her sister.      PHYSICAL EXAM:   Vital signs: /74 (BP Location: Left arm, Patient Position: Sitting)   Pulse 70   Ht 1.499 m (4' 11.02\")   Wt 51.3 kg (113 lb)   LMP  (LMP Unknown)   SpO2 94%   BMI 22.81 kg/m²   GENERAL: Well-developed, well-nourished in no apparent distress.   EYE:  No ocular asymmetry, PERRLA  HENT: Pink, moist mucous membranes.    NECK: No thyromegaly.   CARDIOVASCULAR:  No murmurs  LUNGS: Clear breath sounds  ABDOMEN: Soft, nontender   EXTREMITIES: No clubbing, cyanosis, or edema. "   NEUROLOGICAL: No gross focal motor abnormalities   LYMPH: No cervical adenopathy palpated.   SKIN: No rashes, lesions.       Labs:    Lab Results   Component Value Date/Time    WBC 6.9 11/18/2019 02:26 PM    WBC 5.5 03/04/2011 11:11 AM    RBC 4.37 11/18/2019 02:26 PM    RBC 4.27 03/04/2011 11:11 AM    HEMOGLOBIN 12.8 11/18/2019 02:26 PM    MCV 87.0 11/18/2019 02:26 PM    MCV 91 03/04/2011 11:11 AM    MCH 29.3 11/18/2019 02:26 PM    MCH 30.9 03/04/2011 11:11 AM    MCHC 33.7 11/18/2019 02:26 PM    RDW 40.8 11/18/2019 02:26 PM    RDW 13.8 03/04/2011 11:11 AM    MPV 9.6 11/18/2019 02:26 PM       Lab Results   Component Value Date/Time    SODIUM 135 11/18/2019 02:26 PM    POTASSIUM 4.3 11/18/2019 02:26 PM    CHLORIDE 99 11/18/2019 02:26 PM    CO2 22 11/18/2019 02:26 PM    ANION 14.0 (H) 11/18/2019 02:26 PM    GLUCOSE 96 11/18/2019 02:26 PM    BUN 12 11/18/2019 02:26 PM    CREATININE 0.72 11/18/2019 02:26 PM    CREATININE 0.63 04/30/2013 12:03 PM    CALCIUM 9.1 11/18/2019 02:26 PM    ASTSGOT 12 11/18/2019 02:26 PM    ALTSGPT 12 11/18/2019 02:26 PM    TBILIRUBIN 0.3 11/18/2019 02:26 PM    ALBUMIN 3.9 11/18/2019 02:26 PM    ALBUMIN 3.95 11/14/2019 10:28 AM    TOTPROTEIN 7.1 11/18/2019 02:26 PM    TOTPROTEIN 6.9 11/14/2019 10:28 AM    GLOBULIN 3.2 11/18/2019 02:26 PM    AGRATIO 1.2 11/18/2019 02:26 PM       Lab Results   Component Value Date/Time    TSHULTRASEN <0.005 (L) 11/14/2019 1028     Lab Results   Component Value Date/Time    FREET4 2.08 (H) 11/14/2019 1028     Lab Results   Component Value Date/Time    FREET3 3.75 11/14/2019 1028     No results found for: THYSTIMIG        Imaging:        ASSESSMENT/PLAN:       1. Age-related osteoporosis with current pathological fracture with routine healing, subsequent encounter  She has a high risk of fracture because of T-scores less than -2.5 for the hip and history of fragility fracture involving the spine.  The fact that she had a fracture while on Prolia indicates  treatment failure  She is a candidate for anabolic agent therapy and because she does not have hyperparathyroidism on recent labs we can proceed with this.    I recommend starting Tymlos 80 mcg subcu once daily   I reviewed the side effects of this medication including dizziness postinjection  Reviewed the low risk of developing bone cancer  Reviewed the risk of hypercalcemia while taking this medication which is expected    I am going to send the medication to a specialty pharmacy I explained to the patient that Tymlos has to be approved by her insurance  We will plan for follow-up in 3 months with a repeat of her calcium vitamin D and intact PTH levels      2. 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 Hypothyroidism, unspecified type  She was previously iatrogenically hyperthyroid  I have recommended she stop Cytomel and continue levothyroxine 200 MCG daily  We will plan to repeat her TSH and free T4 levels with her next labs in 3 months    5. Vitamin D deficiency  Vitamin D levels are in adequate recommend that she start ergocalciferol 50,000 units weekly and continue current supplements  Recommend that she ensure adequate intake of calcium  We will plan to repeat her 25-hydroxy vitamin D, calcium and intact PTH level in 3 months      Return in about 3 months (around 3/4/2020).      This patient during there office visit today was started on a new medication.  Side effects of the new medication were discussed with the patient today in the office.     Thank you kindly for  allowing me to participate in the endocrine care plan for this patient.    Toñito Luevano MD, [facility], [facility]  12/04/19    CC:   Ezequiel Grewal M.D.

## 2019-12-13 ENCOUNTER — OFFICE VISIT (OUTPATIENT)
Dept: MEDICAL GROUP | Facility: MEDICAL CENTER | Age: 84
End: 2019-12-13
Payer: MEDICARE

## 2019-12-13 VITALS
TEMPERATURE: 96.4 F | SYSTOLIC BLOOD PRESSURE: 122 MMHG | BODY MASS INDEX: 22.78 KG/M2 | WEIGHT: 113 LBS | DIASTOLIC BLOOD PRESSURE: 68 MMHG | OXYGEN SATURATION: 97 % | HEIGHT: 59 IN | HEART RATE: 96 BPM

## 2019-12-13 DIAGNOSIS — F33.1 MDD (MAJOR DEPRESSIVE DISORDER), RECURRENT EPISODE, MODERATE (HCC): ICD-10-CM

## 2019-12-13 DIAGNOSIS — R80.3 BENCE JONES PROTEINURIA: ICD-10-CM

## 2019-12-13 DIAGNOSIS — J44.1 CHRONIC OBSTRUCTIVE PULMONARY DISEASE WITH ACUTE EXACERBATION (HCC): ICD-10-CM

## 2019-12-13 DIAGNOSIS — S22.080D COMPRESSION FRACTURE OF T12 VERTEBRA WITH ROUTINE HEALING: ICD-10-CM

## 2019-12-13 PROCEDURE — 99214 OFFICE O/P EST MOD 30 MIN: CPT | Performed by: FAMILY MEDICINE

## 2019-12-13 RX ORDER — AZITHROMYCIN 250 MG/1
TABLET, FILM COATED ORAL
Qty: 6 TAB | Refills: 0 | Status: SHIPPED | OUTPATIENT
Start: 2019-12-13 | End: 2019-12-18

## 2019-12-13 NOTE — ASSESSMENT & PLAN NOTE
Paxil increased from 20 mg to 30 mg in August and working very well.  She is no longer irritable and snappy per daughter.

## 2019-12-13 NOTE — ASSESSMENT & PLAN NOTE
Patient is almost 2 months status post compression fracture at T12.  We prescribed tramadol 1 month ago when she states she took about a third of the bottle and then discontinued.  She weaned herself from 2 tablets daily to not taking tramadol.  Getting out of bed and up and down out of a chair still cause discomfort, but overall much improved.

## 2019-12-13 NOTE — ASSESSMENT & PLAN NOTE
Patient had a urine analysis by endocrinology and was found to have positive Bence-Cleveland proteins.  She was referred to oncology, has not scheduled appointment yet.

## 2019-12-13 NOTE — PROGRESS NOTES
Subjective:   Tania Aguilar is a 84 y.o. female here today for compression fracture, cough    Compression fracture of T12 vertebra with routine healing  Patient is almost 2 months status post compression fracture at T12.  We prescribed tramadol 1 month ago when she states she took about a third of the bottle and then discontinued.  She weaned herself from 2 tablets daily to not taking tramadol.  Getting out of bed and up and down out of a chair still cause discomfort, but overall much improved.    MDD (major depressive disorder), recurrent episode, moderate  Paxil increased from 20 mg to 30 mg in August and working very well.  She is no longer irritable and snappy per daughter.    COPD (chronic obstructive pulmonary disease)  Patient has had increased cough recently.  Most of her family members are also sick.    Bence Cleveland proteinuria  Patient had a urine analysis by endocrinology and was found to have positive Bence-Cleveland proteins.  She was referred to oncology, has not scheduled appointment yet.         Current medicines (including changes today)  Current Outpatient Medications   Medication Sig Dispense Refill   • azithromycin (ZITHROMAX) 250 MG Tab 2 tabs by mouth day 1, 1 tab by mouth days 2-5 6 Tab 0   • Cholecalciferol (VITAMIN D3) 25 MCG Tab Take  by mouth.     • Abaloparatide (ABALOPARATIDE) 3120 MCG/1.56ML Solution Pen-injector Inject 80 mcg as instructed every day for 30 days. 1 PEN 11   • vitamin D, Ergocalciferol, (DRISDOL) 93336 units Cap capsule Take 1 Cap by mouth every 7 days. 12 Cap 1   • levothyroxine (SYNTHROID) 200 MCG Tab Take 200 mcg by mouth Every morning on an empty stomach.     • POLYETHYLENE GLYCOL 3350 PO Take 17 g by mouth 1 time daily as needed. Indications: Constipation     • ezetimibe (ZETIA) 10 MG Tab Take 10 mg by mouth every evening.  3   • omeprazole (PRILOSEC) 40 MG delayed-release capsule Take 1 Cap by mouth every day. 90 Cap 3   • PARoxetine (PAXIL) 30 MG Tab Take 1 Tab  "by mouth every day. 90 Tab 3   • oxybutynin SR (DITROPAN-XL) 5 MG TABLET SR 24 HR Take 1 Tab by mouth every day. 90 Tab 3   • cyanocobalamin (VITAMIN B-12) 1000 MCG/ML Solution 1,000 mcg by Intramuscular route every 7 days. On Wed     • cetirizine (ZYRTEC) 10 MG Tab Take 10 mg by mouth every morning.       No current facility-administered medications for this visit.      She  has a past medical history of Allergic rhinitis, Arthritis, ASTHMA, Atrial fibrillation ([facility]) (January 2015), B12 deficiency, Cancer ([facility]), CHI (closed head injury), COPD (chronic obstructive pulmonary disease) ([facility]), Degenerative joint disease, Dental disorder, Depression, GERD (gastroesophageal reflux disease), Hemorrhoids, [place] (hard of hearing) ( ), HSV-1 infection, Hypertension, Hypothyroidism, MEDICAL HOME, Meningitis, Osteoporosis, Shingles, Sinusitis due to Moraxella catarrhalis, Stress incontinence, Thyroid cancer ([facility]), and Vulvar cancer ([facility]). She also has no past medical history of Breast cancer ([facility]).    ROS   No fever, positive productive cough       Objective:     /68 (BP Location: Right arm, Patient Position: Sitting)   Pulse 96   Temp (!) 35.8 °C (96.4 °F)   Ht 1.499 m (4' 11\")   Wt 51.3 kg (113 lb)   SpO2 97%  Body mass index is 22.82 kg/m².   Physical Exam:  Constitutional: Alert, no distress.  Skin: Warm, dry, good turgor, no rashes in visible areas.  Eye: Equal, round and reactive, conjunctiva clear, lids normal.  Respiratory: Unlabored respiratory effort, positive expiratory wheeze with coughing.  Psych: Alert and oriented x3, normal affect and mood.        Assessment and Plan:   The following treatment plan was discussed    1. Compression fracture of T12 vertebra with routine healing  Improving slowly.  Continue with routine healing.    2. Chronic obstructive pulmonary disease with acute exacerbation (HCC)  New problem.  Prescription for XIAOJohn.  - azithromycin (ZITHROMAX) 250 MG Tab; 2 tabs by mouth day 1, 1 tab " by mouth days 2-5  Dispense: 6 Tab; Refill: 0    3. Bence Cleveland proteinuria  Advised patient to schedule point with oncologist.  Oncology scheduling number and information given.    4. MDD (major depressive disorder), recurrent episode, moderate (HCC)  Controlled.  Continue Paxil 30 mg daily.      Followup: Return if symptoms worsen or fail to improve.

## 2019-12-14 NOTE — PROGRESS NOTES
Please inform patient since [facility] did not send any forms to us after sending tymlos  Script on DEC 4, 2019    I am resending a new tymlos script to [facility] in [place] which is recommended by tymlos     TO MAs  Please  Follow up and check if [facility] sends [place] forms

## 2019-12-16 NOTE — PROGRESS NOTES
Phone Number Called: Pharmacy phone on file    Call outcome: spoke to patient regarding message below    Message: Spoke to pharmacy they said they have not received the prescription. He sent a form for us to fill out from [facility] on [address]. Also requested we fill out a prior auth for Tymlos. Sent to [email or web address]. Waiting for approval

## 2019-12-17 ENCOUNTER — TELEPHONE (OUTPATIENT)
Dept: ENDOCRINOLOGY | Facility: MEDICAL CENTER | Age: 84
End: 2019-12-17

## 2019-12-17 NOTE — TELEPHONE ENCOUNTER
FINAL PRIOR AUTHORIZATION STATUS:    1.  Name of Medication & Dose: Tymlos 80 mcg     2. Prior Auth Status (if approved--length of approval):  Approved 12/16/19-12/15/20    3. Action Taken: Pharmacy Notified: yes    Documentation was scanned into media and attached to this telephone encounter.

## 2019-12-19 SDOH — ECONOMIC STABILITY: FOOD INSECURITY: WITHIN THE PAST 12 MONTHS, YOU WORRIED THAT YOUR FOOD WOULD RUN OUT BEFORE YOU GOT MONEY TO BUY MORE.: NEVER TRUE

## 2019-12-19 SDOH — ECONOMIC STABILITY: FOOD INSECURITY: WITHIN THE PAST 12 MONTHS, THE FOOD YOU BOUGHT JUST DIDN'T LAST AND YOU DIDN'T HAVE MONEY TO GET MORE.: NEVER TRUE

## 2019-12-19 SDOH — ECONOMIC STABILITY: TRANSPORTATION INSECURITY
IN THE PAST 12 MONTHS, HAS THE LACK OF TRANSPORTATION KEPT YOU FROM MEDICAL APPOINTMENTS OR FROM GETTING MEDICATIONS?: NO

## 2019-12-19 SDOH — ECONOMIC STABILITY: TRANSPORTATION INSECURITY
IN THE PAST 12 MONTHS, HAS LACK OF TRANSPORTATION KEPT YOU FROM MEETINGS, WORK, OR FROM GETTING THINGS NEEDED FOR DAILY LIVING?: NO

## 2019-12-19 NOTE — PROGRESS NOTES
1. HealthConnect Verified: yes    2. Verify PCP: yes    3. Review and add  to Care Team: yes    5. Reviewed/Updated the following with patient:       •   Communication Preference Obtained? YES  • MyChart Activation: already active       •   E-Mail Address Obtained? YES       •   Appointment Day and Time Preferences? YES       •   Preferred Pharmacy? YES       •   Preferred Lab? YES    6. Care Gap Scheduling (Attempt to Schedule EACH Overdue Care Gap!)    Patient declined Immunizations: Patient is up to date on all vaccines.

## 2019-12-19 NOTE — TELEPHONE ENCOUNTER
VOICEMAIL    1. Caller Name: Kaia with [employer]                          Call Back Number: [phone]    2. Message: Kaia with [employer] insurance is requesting the medication Tymlos to be sent to [facility]. That is the preferred specialty pharmacy, no [facility]. I updated the pharmacy. Will you resend this to [place].    Thank you    3. Patient approves office to leave a detailed voicemail/MyChart message: yes

## 2019-12-19 NOTE — PROGRESS NOTES
- to Mary Grace    I am resending the Tymlos script to different specialty pharmacy due to the recent message from 12/18/2019  ( see chart)

## 2020-01-01 ENCOUNTER — PATIENT MESSAGE (OUTPATIENT)
Dept: MEDICAL GROUP | Facility: MEDICAL CENTER | Age: 85
End: 2020-01-01

## 2020-01-01 ENCOUNTER — OFFICE VISIT (OUTPATIENT)
Dept: MEDICAL GROUP | Facility: MEDICAL CENTER | Age: 85
End: 2020-01-01
Payer: MEDICARE

## 2020-01-01 ENCOUNTER — HOSPITAL ENCOUNTER (OUTPATIENT)
Dept: RADIOLOGY | Facility: MEDICAL CENTER | Age: 85
End: 2020-12-16
Attending: FAMILY MEDICINE
Payer: MEDICARE

## 2020-01-01 VITALS
HEART RATE: 80 BPM | DIASTOLIC BLOOD PRESSURE: 78 MMHG | OXYGEN SATURATION: 95 % | BODY MASS INDEX: 23.83 KG/M2 | WEIGHT: 118.2 LBS | SYSTOLIC BLOOD PRESSURE: 134 MMHG | TEMPERATURE: 98.5 F | HEIGHT: 59 IN

## 2020-01-01 DIAGNOSIS — Z12.31 SCREENING MAMMOGRAM, ENCOUNTER FOR: ICD-10-CM

## 2020-01-01 DIAGNOSIS — C90.00 MULTIPLE MYELOMA NOT HAVING ACHIEVED REMISSION (HCC): ICD-10-CM

## 2020-01-01 DIAGNOSIS — Z87.412 HISTORY OF VULVAR DYSPLASIA: ICD-10-CM

## 2020-01-01 DIAGNOSIS — F33.1 MDD (MAJOR DEPRESSIVE DISORDER), RECURRENT EPISODE, MODERATE (HCC): ICD-10-CM

## 2020-01-01 DIAGNOSIS — E03.9 HYPOTHYROIDISM, UNSPECIFIED TYPE: ICD-10-CM

## 2020-01-01 DIAGNOSIS — N39.46 MIXED INCONTINENCE URGE AND STRESS: ICD-10-CM

## 2020-01-01 DIAGNOSIS — I48.0 PAROXYSMAL ATRIAL FIBRILLATION (HCC): ICD-10-CM

## 2020-01-01 DIAGNOSIS — Z00.00 MEDICARE ANNUAL WELLNESS VISIT, SUBSEQUENT: ICD-10-CM

## 2020-01-01 DIAGNOSIS — J44.9 CHRONIC OBSTRUCTIVE PULMONARY DISEASE, UNSPECIFIED COPD TYPE (HCC): ICD-10-CM

## 2020-01-01 PROCEDURE — 8041 PR SCP AHA: Performed by: FAMILY MEDICINE

## 2020-01-01 PROCEDURE — G0439 PPPS, SUBSEQ VISIT: HCPCS | Performed by: FAMILY MEDICINE

## 2020-01-01 PROCEDURE — 77067 SCR MAMMO BI INCL CAD: CPT

## 2020-01-01 RX ORDER — PAROXETINE 10 MG/1
10 TABLET, FILM COATED ORAL DAILY
Qty: 90 TAB | Refills: 3 | Status: SHIPPED | OUTPATIENT
Start: 2020-01-01 | End: 2021-01-01 | Stop reason: DRUGHIGH

## 2020-01-01 ASSESSMENT — ACTIVITIES OF DAILY LIVING (ADL): BATHING_REQUIRES_ASSISTANCE: 0

## 2020-01-01 ASSESSMENT — PATIENT HEALTH QUESTIONNAIRE - PHQ9: CLINICAL INTERPRETATION OF PHQ2 SCORE: 0

## 2020-01-01 ASSESSMENT — FIBROSIS 4 INDEX: FIB4 SCORE: 0.66

## 2020-01-01 ASSESSMENT — ENCOUNTER SYMPTOMS: GENERAL WELL-BEING: GOOD

## 2020-01-07 ENCOUNTER — TELEPHONE (OUTPATIENT)
Dept: ENDOCRINOLOGY | Facility: MEDICAL CENTER | Age: 85
End: 2020-01-07

## 2020-01-07 NOTE — TELEPHONE ENCOUNTER
Phone Number Called: [phone]    Call outcome: left message for patient to call back regarding message below    Message: Contacted patient but there was no answer. I left a message to give a call back. If patient calls back Dr. Luevano wanted me to check with the patient if they had received Tymlos.

## 2020-01-07 NOTE — TELEPHONE ENCOUNTER
----- Message from Toñito Luevano M.D. sent at 1/4/2020  5:37 AM PST -----  Regarding: tymlos  Dear Mary Grace    Do you mind checking if patient got her tymlos ?    I sent her script to 3 different specialty pharmacies just to get it approved    Thanks    Dr. Luevano

## 2020-01-23 ENCOUNTER — OFFICE VISIT (OUTPATIENT)
Dept: MEDICAL GROUP | Facility: MEDICAL CENTER | Age: 85
End: 2020-01-23
Payer: MEDICARE

## 2020-01-23 VITALS
RESPIRATION RATE: 16 BRPM | OXYGEN SATURATION: 94 % | TEMPERATURE: 99 F | BODY MASS INDEX: 23.22 KG/M2 | HEART RATE: 92 BPM | WEIGHT: 115.2 LBS | SYSTOLIC BLOOD PRESSURE: 118 MMHG | HEIGHT: 59 IN | DIASTOLIC BLOOD PRESSURE: 68 MMHG

## 2020-01-23 DIAGNOSIS — J44.0 CHRONIC OBSTRUCTIVE PULMONARY DISEASE WITH ACUTE LOWER RESPIRATORY INFECTION (HCC): ICD-10-CM

## 2020-01-23 DIAGNOSIS — R05.9 COUGH: ICD-10-CM

## 2020-01-23 PROCEDURE — 99214 OFFICE O/P EST MOD 30 MIN: CPT | Performed by: NURSE PRACTITIONER

## 2020-01-23 RX ORDER — DOXYCYCLINE HYCLATE 100 MG
100 TABLET ORAL 2 TIMES DAILY
Qty: 10 TAB | Refills: 0 | Status: SHIPPED | OUTPATIENT
Start: 2020-01-23 | End: 2020-01-28

## 2020-01-23 RX ORDER — METHYLPREDNISOLONE 4 MG/1
TABLET ORAL
Qty: 21 TAB | Refills: 0 | Status: SHIPPED | OUTPATIENT
Start: 2020-01-23 | End: 2020-06-11

## 2020-01-23 RX ORDER — LEVALBUTEROL TARTRATE 45 UG/1
1-2 AEROSOL, METERED ORAL EVERY 4 HOURS PRN
Qty: 1 INHALER | Refills: 2 | Status: SHIPPED | OUTPATIENT
Start: 2020-01-23 | End: 2020-06-11

## 2020-01-24 NOTE — ASSESSMENT & PLAN NOTE
"Has had \"flu like\" symptoms over the past 9 days. Felt like her head congestion has improved but is now having increased cough, chest congestion, feels like it is draining from her sinuses and into her chest. Also having a headache which is abnormal for her.     She was having a lot of malaise, sleeping most of the day. No energy. Poor appetite. Now having sore throat.     Denies known fevers, chills, body aches.     Has been taking dayquil and nyquil and cough drops.     Does have some tight chest, wheezy, mild shortness of breath.     Not using albuterol, hasn't used for years.   "

## 2020-01-24 NOTE — PROGRESS NOTES
"Subjective:   Tania Aguilar is a 84 y.o. female here today for cough:    Cough  Has had \"flu like\" symptoms over the past 9 days. Felt like her head congestion has improved but is now having increased cough, chest congestion, feels like it is draining from her sinuses and into her chest. Also having a headache which is abnormal for her.     She was having a lot of malaise, sleeping most of the day. No energy. Poor appetite. Now having sore throat.     Denies known fevers, chills, body aches.     Has been taking dayquil and nyquil and cough drops.     Does have some tight chest, wheezy, mild shortness of breath.     Not using albuterol, hasn't used for years.        Current medicines (including changes today)  Current Outpatient Medications   Medication Sig Dispense Refill   • levalbuterol (XOPENEX HFA) 45 MCG/ACT inhaler Inhale 1-2 Puffs by mouth every four hours as needed for Shortness of Breath. 1 Inhaler 2   • doxycycline (VIBRAMYCIN) 100 MG Tab Take 1 Tab by mouth 2 times a day for 5 days. 10 Tab 0   • methylPREDNISolone (MEDROL DOSEPAK) 4 MG Tablet Therapy Pack As directed on the packaging label. 21 Tab 0   • Cholecalciferol (VITAMIN D3) 25 MCG Tab Take  by mouth.     • vitamin D, Ergocalciferol, (DRISDOL) 86539 units Cap capsule Take 1 Cap by mouth every 7 days. 12 Cap 1   • levothyroxine (SYNTHROID) 200 MCG Tab Take 200 mcg by mouth Every morning on an empty stomach.     • POLYETHYLENE GLYCOL 3350 PO Take 17 g by mouth 1 time daily as needed. Indications: Constipation     • ezetimibe (ZETIA) 10 MG Tab Take 10 mg by mouth every evening.  3   • omeprazole (PRILOSEC) 40 MG delayed-release capsule Take 1 Cap by mouth every day. 90 Cap 3   • PARoxetine (PAXIL) 30 MG Tab Take 1 Tab by mouth every day. 90 Tab 3   • oxybutynin SR (DITROPAN-XL) 5 MG TABLET SR 24 HR Take 1 Tab by mouth every day. 90 Tab 3   • cyanocobalamin (VITAMIN B-12) 1000 MCG/ML Solution 1,000 mcg by Intramuscular route every 7 days. On Wed  " "   • cetirizine (ZYRTEC) 10 MG Tab Take 10 mg by mouth every morning.       No current facility-administered medications for this visit.      She  has a past medical history of Allergic rhinitis, Arthritis, ASTHMA, Atrial fibrillation (HCC) (January 2015), B12 deficiency, Cancer (HCC), CHI (closed head injury), COPD (chronic obstructive pulmonary disease) (HCC), Degenerative joint disease, Dental disorder, Depression, GERD (gastroesophageal reflux disease), Hemorrhoids, [place] (hard of hearing) ( ), HSV-1 infection, Hypertension, Hypothyroidism, MEDICAL HOME, Meningitis, Osteoporosis, Shingles, Sinusitis due to Moraxella catarrhalis, Stress incontinence, Thyroid cancer (HCC), and Vulvar cancer ([facility]). She also has no past medical history of Breast cancer ([facility]).    ROS  No chest pain, no shortness of breath, no abdominal pain  Positive ROS as per HPI.  All other systems reviewed and are negative.     Objective:     /68 (BP Location: Right arm, Patient Position: Sitting, BP Cuff Size: Adult)   Pulse 92   Temp 37.2 °C (99 °F) (Temporal)   Resp 16   Ht 1.499 m (4' 11\")   Wt 52.3 kg (115 lb 3.2 oz)   SpO2 94%  Body mass index is 23.27 kg/m².     Physical Exam:  Constitutional: Alert, no distress.  Skin: Warm, dry, good turgor, no rashes in visible areas.  Eye: Equal, round, conjunctiva clear, lids normal.  ENMT: Lips without lesions, TMs WNL, pharyngeal erythema  Neck: Trachea midline, no masses, no thyromegaly. + cervical lymphadenopathy  Respiratory: Unlabored respiratory effort, rhonchi and wheezes noted in bilateral lower lobes, wheezes bilateral upper lobes  Cardiovascular: Normal S1, S2, + murmur, no edema, irregularly irregular rhythm  Psych: Alert and oriented x3, normal affect and mood.      Assessment and Plan:   The following treatment plan was discussed    1.  Chronic obstructive pulmonary disease with acute lower respiratory infection (HCC)  Unstable  Doxycycline 100 mg BID for 5 days  Medrol x 5 " days  Reaction to albuterol in the past, possibly due to taking extra puffs accidentally.  Trial of xopenex for as needed wheezing, shortness of breath  Start OTC flonase daily for post nasal drainage  - levalbuterol (XOPENEX HFA) 45 MCG/ACT inhaler; Inhale 1-2 Puffs by mouth every four hours as needed for Shortness of Breath.  Dispense: 1 Inhaler; Refill: 2  - doxycycline (VIBRAMYCIN) 100 MG Tab; Take 1 Tab by mouth 2 times a day for 5 days.  Dispense: 10 Tab; Refill: 0  - methylPREDNISolone (MEDROL DOSEPAK) 4 MG Tablet Therapy Pack; As directed on the packaging label.  Dispense: 21 Tab; Refill: 0      Followup: Return if symptoms worsen or fail to improve.    I have placed the below orders and discussed them with an approved delegating provider. The MA is performing the below orders under the direction of Dr. Grewal

## 2020-02-03 ENCOUNTER — SLEEP STUDY (OUTPATIENT)
Dept: SLEEP MEDICINE | Facility: MEDICAL CENTER | Age: 85
End: 2020-02-03
Attending: NURSE PRACTITIONER
Payer: MEDICARE

## 2020-02-03 DIAGNOSIS — G47.33 OSA (OBSTRUCTIVE SLEEP APNEA): ICD-10-CM

## 2020-02-03 PROCEDURE — 95811 POLYSOM 6/>YRS CPAP 4/> PARM: CPT | Performed by: FAMILY MEDICINE

## 2020-02-04 NOTE — PROCEDURES
Technical summary: The patient underwent a CPAP titration.  This was a 16 channel montage study to include a 6 channel EEG, a 2 channel EOG, and chin EMG, left and right leg EMG, a snore channel, and a CFLOW pressure transducer.   Respiratory effort was assessed with the use of a thoracic and abdominal monitor and overnight oximetry was obtained. Audio and video recordings were reviewed. This was a fully attended study and sleep stage scoring was performed. The test was technically adequate.      Interpretation:  Study start time was 08:55:43 PM.  Diagnostic recording time was 8h 46.5m with a total sleep time of 5h 17.0m resulting in a sleep efficiency of 60.21%%.  Sleep latency from the start of the study was 136 minutes and the latency from sleep to REM was 00 minutes.In total,42  arousals were scored for an arousal index of 7.9. sleep stages showed decreased SE, increased SL, normal WASO, normal N3 but no REM sleep,    Respiratory:  There were a total of 0 apneas consisting of 0 obstructive apneas, 0 mixed apneas, and 0 central apneas.  A total of 4 hypopneas were scored.The apnea index was 0.00 per hour and the hypopnea index was 0.76 per hour resulting in an overall AHI of 0.76.AHI during rem was 0.0 and AHI while supine was 0.00.    Oximetry:  There was a mean oxygen saturation of 92.0% with a minimum oxygen saturation of 88.0%.  Time spent with oxygen saturations below 89% was 0.7 minutes.    Cardiac:  The highest heart rate seen while awake was 90 BPM while the highest heart rate during sleep was 86 BPM with an average sleeping heart rate of 77 BPM.    Limb Movements:  There were a total of 357 PLMs during sleep, of which 33 were PLMS arousals.  This resulted in a PLMS index of 67.6 and a PLMS arousal index of 6.2.     CPAP was tried from 5 to 6cm H2O.    CPAP Titration:  The PAP titration was initiated with CPAP 5 cm of water and the pressure which was slowly titrated up in an attempt to eliminate sleep  disordered breathing and snoring. The final pressure tested during the study was CPAP 6 cm water and at this final pressure the patient was observed in the supine but not REM sleep stage. The apnea hypopnea index improved to 1.4 per hour and O2 nya 90%. The average O2 stauration was 93%. She spent 0.7 min of sleep time below 89% O2 saturation. Snoring was resolved. The patient utilized small dreamwear FFM with heated humidification. The CPAP was well-tolerated and there were minimal air leaks. No supplemental oxygen was required.    Impression:  1.  Obstructive sleep apnea   2.  PLMS      Recommendations:  I recommend CPAP 6 cm with small dreamwear FFM. Recommended 30 day compliance download to assess the efficacy of the recommended pressure and compliance for further outpatient monitoring and management of CPAP therapy. In some cases alternative treatment options may prove effective in resolving sleep apnea and these options include upper airway surgery, the use of a dental orthotic or weight loss and positional therapy. Clinical correlation is required. In general patients with sleep apnea are advised to avoid alcohol and sedatives and to not operate a motor vehicle while drowsy and are at a greater risk for cardiovascular disease.

## 2020-02-12 ENCOUNTER — PATIENT OUTREACH (OUTPATIENT)
Dept: HEALTH INFORMATION MANAGEMENT | Facility: OTHER | Age: 85
End: 2020-02-12

## 2020-02-13 NOTE — PROGRESS NOTES
Called and wish the member a happy birthday. No answer, left message. If the member calls back, please transfer to [phone].

## 2020-02-19 ENCOUNTER — HOSPITAL ENCOUNTER (OUTPATIENT)
Dept: LAB | Facility: MEDICAL CENTER | Age: 85
End: 2020-02-19
Attending: INTERNAL MEDICINE
Payer: MEDICARE

## 2020-02-19 ENCOUNTER — TELEPHONE (OUTPATIENT)
Dept: SLEEP MEDICINE | Facility: MEDICAL CENTER | Age: 85
End: 2020-02-19

## 2020-02-19 DIAGNOSIS — E03.9 HYPOTHYROIDISM, UNSPECIFIED TYPE: ICD-10-CM

## 2020-02-19 DIAGNOSIS — S22.080D COMPRESSION FRACTURE OF T12 VERTEBRA WITH ROUTINE HEALING: ICD-10-CM

## 2020-02-19 DIAGNOSIS — R80.3 BENCE JONES PROTEINURIA: ICD-10-CM

## 2020-02-19 DIAGNOSIS — M80.00XD AGE-RELATED OSTEOPOROSIS WITH CURRENT PATHOLOGICAL FRACTURE WITH ROUTINE HEALING, SUBSEQUENT ENCOUNTER: ICD-10-CM

## 2020-02-19 LAB
ALBUMIN SERPL BCP-MCNC: 4 G/DL (ref 3.2–4.9)
ALBUMIN/GLOB SERPL: 1.4 G/DL
ALP SERPL-CCNC: 89 U/L (ref 30–99)
ALT SERPL-CCNC: 14 U/L (ref 2–50)
ANION GAP SERPL CALC-SCNC: 6 MMOL/L (ref 0–11.9)
AST SERPL-CCNC: 15 U/L (ref 12–45)
BILIRUB SERPL-MCNC: 0.3 MG/DL (ref 0.1–1.5)
BUN SERPL-MCNC: 17 MG/DL (ref 8–22)
CALCIUM SERPL-MCNC: 9.2 MG/DL (ref 8.5–10.5)
CHLORIDE SERPL-SCNC: 103 MMOL/L (ref 96–112)
CO2 SERPL-SCNC: 28 MMOL/L (ref 20–33)
CREAT SERPL-MCNC: 0.94 MG/DL (ref 0.5–1.4)
GLOBULIN SER CALC-MCNC: 2.8 G/DL (ref 1.9–3.5)
GLUCOSE SERPL-MCNC: 112 MG/DL (ref 65–99)
POTASSIUM SERPL-SCNC: 4.1 MMOL/L (ref 3.6–5.5)
PROT SERPL-MCNC: 6.8 G/DL (ref 6–8.2)
SODIUM SERPL-SCNC: 137 MMOL/L (ref 135–145)

## 2020-02-19 PROCEDURE — 83970 ASSAY OF PARATHORMONE: CPT

## 2020-02-19 PROCEDURE — 82306 VITAMIN D 25 HYDROXY: CPT

## 2020-02-19 PROCEDURE — 84443 ASSAY THYROID STIM HORMONE: CPT

## 2020-02-19 PROCEDURE — 80053 COMPREHEN METABOLIC PANEL: CPT

## 2020-02-19 PROCEDURE — 36415 COLL VENOUS BLD VENIPUNCTURE: CPT

## 2020-02-19 PROCEDURE — 84439 ASSAY OF FREE THYROXINE: CPT

## 2020-02-19 NOTE — TELEPHONE ENCOUNTER
Called pt to discuss decreasing pressure to 6cm per Sofia's request based off of her SS.    Pt states she can not come by here usually but will try.    Currently not using the CPAP and said she sleep better without it.    If she comes by we will change it and have her use it up until her next appt.

## 2020-02-20 LAB
25(OH)D3 SERPL-MCNC: 56 NG/ML (ref 30–100)
PTH-INTACT SERPL-MCNC: 26.7 PG/ML (ref 14–72)
T4 FREE SERPL-MCNC: 1.97 NG/DL (ref 0.53–1.43)
TSH SERPL DL<=0.005 MIU/L-ACNC: <0.005 UIU/ML (ref 0.38–5.33)

## 2020-02-24 ENCOUNTER — HOSPITAL ENCOUNTER (OUTPATIENT)
Dept: LAB | Facility: MEDICAL CENTER | Age: 85
End: 2020-02-24
Attending: INTERNAL MEDICINE
Payer: MEDICARE

## 2020-02-24 PROCEDURE — 82784 ASSAY IGA/IGD/IGG/IGM EACH: CPT

## 2020-02-24 PROCEDURE — 82232 ASSAY OF BETA-2 PROTEIN: CPT

## 2020-02-24 PROCEDURE — 83520 IMMUNOASSAY QUANT NOS NONAB: CPT

## 2020-02-24 PROCEDURE — 36415 COLL VENOUS BLD VENIPUNCTURE: CPT

## 2020-02-24 PROCEDURE — 84165 PROTEIN E-PHORESIS SERUM: CPT

## 2020-02-24 PROCEDURE — 84155 ASSAY OF PROTEIN SERUM: CPT

## 2020-02-24 PROCEDURE — 80053 COMPREHEN METABOLIC PANEL: CPT

## 2020-02-24 PROCEDURE — 84156 ASSAY OF PROTEIN URINE: CPT

## 2020-02-25 LAB
ALBUMIN SERPL BCP-MCNC: 3.9 G/DL (ref 3.2–4.9)
ALBUMIN/GLOB SERPL: 1.3 G/DL
ALP SERPL-CCNC: 93 U/L (ref 30–99)
ALT SERPL-CCNC: 11 U/L (ref 2–50)
ANION GAP SERPL CALC-SCNC: 7 MMOL/L (ref 0–11.9)
AST SERPL-CCNC: 12 U/L (ref 12–45)
BILIRUB SERPL-MCNC: 0.4 MG/DL (ref 0.1–1.5)
BUN SERPL-MCNC: 15 MG/DL (ref 8–22)
CALCIUM SERPL-MCNC: 9.2 MG/DL (ref 8.5–10.5)
CHLORIDE SERPL-SCNC: 104 MMOL/L (ref 96–112)
CO2 SERPL-SCNC: 24 MMOL/L (ref 20–33)
CREAT SERPL-MCNC: 0.82 MG/DL (ref 0.5–1.4)
GLOBULIN SER CALC-MCNC: 3.1 G/DL (ref 1.9–3.5)
GLUCOSE SERPL-MCNC: 103 MG/DL (ref 65–99)
POTASSIUM SERPL-SCNC: 4.2 MMOL/L (ref 3.6–5.5)
PROT SERPL-MCNC: 7 G/DL (ref 6–8.2)
SODIUM SERPL-SCNC: 135 MMOL/L (ref 135–145)

## 2020-02-26 LAB
B2 MICROGLOB SERPL-MCNC: 3.2 MG/L (ref 1.1–2.4)
IGA SERPL-MCNC: 15 MG/DL (ref 68–408)
IGG SERPL-MCNC: 1420 MG/DL (ref 768–1632)
IGM SERPL-MCNC: 17 MG/DL (ref 35–263)
KAPPA LC FREE SER-MCNC: 50.36 MG/L (ref 3.3–19.4)
KAPPA LC FREE/LAMBDA FREE SER NEPH: 12.34 {RATIO} (ref 0.26–1.65)
LAMBDA LC FREE SERPL-MCNC: 4.08 MG/L (ref 5.71–26.3)

## 2020-02-27 LAB
ALBUMIN SERPL ELPH-MCNC: 4.1 G/DL (ref 3.75–5.01)
ALPHA1 GLOB SERPL ELPH-MCNC: 0.26 G/DL (ref 0.19–0.46)
ALPHA2 GLOB SERPL ELPH-MCNC: 0.7 G/DL (ref 0.48–1.05)
B-GLOBULIN SERPL ELPH-MCNC: 0.57 G/DL (ref 0.48–1.1)
COLLECT DURATION TIME SPEC: NORMAL H
EER PROT ELECT SER Q1092: NORMAL
GAMMA GLOB SERPL ELPH-MCNC: 1.17 G/DL (ref 0.62–1.51)
INTERPRETATION SERPL IFE-IMP: NORMAL
INTERPRETATION UR IFE-IMP: NORMAL
KAPPA LC FREE 24H UR-MRATE: NORMAL MG/D
KAPPA LC FREE UR-MCNC: 12.08 MG/L (ref 0–32.9)
LAMBDA LC FREE 24H UR-MRATE: NORMAL MG/D
LAMBDA LC FREE UR-MCNC: <0.74 MG/L (ref 0–3.79)
PROT 24H UR-MRATE: NORMAL MG/D
PROT SERPL-MCNC: 6.8 G/DL (ref 6.3–8.2)
TOTAL VOLUME 1105: NORMAL ML

## 2020-03-04 ENCOUNTER — OFFICE VISIT (OUTPATIENT)
Dept: ENDOCRINOLOGY | Facility: MEDICAL CENTER | Age: 85
End: 2020-03-04
Payer: MEDICARE

## 2020-03-04 VITALS
BODY MASS INDEX: 23.63 KG/M2 | WEIGHT: 117.2 LBS | HEART RATE: 105 BPM | HEIGHT: 59 IN | DIASTOLIC BLOOD PRESSURE: 70 MMHG | SYSTOLIC BLOOD PRESSURE: 140 MMHG | OXYGEN SATURATION: 96 %

## 2020-03-04 DIAGNOSIS — E55.9 VITAMIN D DEFICIENCY: ICD-10-CM

## 2020-03-04 DIAGNOSIS — E53.8 B12 DEFICIENCY: ICD-10-CM

## 2020-03-04 DIAGNOSIS — S22.080D COMPRESSION FRACTURE OF T12 VERTEBRA WITH ROUTINE HEALING: ICD-10-CM

## 2020-03-04 DIAGNOSIS — M80.00XD AGE-RELATED OSTEOPOROSIS WITH CURRENT PATHOLOGICAL FRACTURE WITH ROUTINE HEALING, SUBSEQUENT ENCOUNTER: ICD-10-CM

## 2020-03-04 DIAGNOSIS — R80.3 BENCE JONES PROTEINURIA: ICD-10-CM

## 2020-03-04 DIAGNOSIS — E03.9 HYPOTHYROIDISM, UNSPECIFIED TYPE: ICD-10-CM

## 2020-03-04 PROCEDURE — 99214 OFFICE O/P EST MOD 30 MIN: CPT | Performed by: INTERNAL MEDICINE

## 2020-03-04 RX ORDER — CYANOCOBALAMIN 1000 UG/ML
1000 INJECTION, SOLUTION INTRAMUSCULAR; SUBCUTANEOUS
Qty: 12 VIAL | Refills: 0 | Status: SHIPPED | OUTPATIENT
Start: 2020-03-04 | End: 2020-06-02

## 2020-03-04 RX ORDER — LEVOTHYROXINE SODIUM 175 UG/1
175 TABLET ORAL
Qty: 90 TAB | Refills: 1 | Status: SHIPPED | OUTPATIENT
Start: 2020-03-04 | End: 2020-06-11

## 2020-03-04 ASSESSMENT — FIBROSIS 4 INDEX: FIB4 SCORE: 0.85

## 2020-03-04 NOTE — PROGRESS NOTES
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 female here for follow up of the above medical issue.  In summary she has severe osteoporosis because of having a fragility fracture despite being on therapy with denosumab which was being given by another doctor.  Last bone density on November 21, 2019 showed the lowest T score of -2.7 for the left femur compatible with osteoporosis.  She was diagnosed with a T12 compression fracture on November 4, 2019 after a fall.    She is now on anabolic therapy with Tymlos 80 mcg subcu daily for her severe osteoporosis.  She is tolerating the medication well and denies dizziness, lightheadedness and other side effects.  Her baseline PTH levels were normal.  She is also taking calcium 600 mg daily and ergocalciferol 50,000 units weekly.    Since last visit patient reports feeling good.  She denies interval fracture.  She denies interval fall.  She denies interval nephrolithiasis.  She reports adherence to calcium supplementation recommendations.  She reports adherence to vitamin D supplementation.  Her activity level: no regular exercise, sedentary      Her most recent vitamin D level is adequate at 56 with a normal calcium of 9.2 and normal alkaline phosphatase of 89 and normal PTH level of 26.    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  She is now being seen by Dr. Sinha      She has a history of iatrogenic hyperthyroidism because she was being given too much thyroid hormone by another endocrinologist despite her history of atrial fibrillation and osteoporosis.  Originally she was taking levothyroxine 200+ Cytomel 25 mcg once a day    On follow-up she is now taking levothyroxine 200 MCG daily and despite dose  reduction and discontinuation of Cytomel her TSH is still suppressed at less than 0.005 with frankly elevated free T4 levels of 1.97 on February 19, 2020      Patient's medications, allergies, and social histories were reviewed and updated as appropriate.      ROS:       CONS:     No fever, no chills   EYES:     No diplopia, no blurry vision   CV:           No chest pain, no palpitations   PULM:     No SOB, no cough, no hemoptysis.   GI:            No nausea, no vomiting, no diarrhea, no constipation   ENDO:     No polyuria, no polydipsia, no heat intolerance, no cold intolerance     Past Medical History:  Problem List:  2020-01: Cough  2019-12: Bence Cleveland proteinuria  2019-12: Vitamin D deficiency  2019-11: Compression fracture of T12 vertebra with routine healing  2019-10: Breast pain, right  2019-02: Poor balance  2017-07: Mild intermittent asthma without complication  2016-09: Osteoporosis  2016-09: Seasonal allergies  2016-09: Prediabetes  2016-07: SEA on CPAP  2015-11: Weight loss  2015-11: Diarrhea  2015-11: Neck stiffness  2015-08: Memory loss, short term  2015-06: COPD exacerbation ([facility])  2015-05: Anemia  2015-05: Acute gastrointestinal hemorrhage  2015-05: Abdominal pain, other specified site  2015-05: Nausea & vomiting  2015-05: Atrial fibrillation ([facility])  2015-05: Hyponatremia  2015-05: SBO (small bowel obstruction) ([facility])  2015-05: Supratherapeutic INR  2015-02: Chronic anticoagulation  2015-02: MEDICAL HOME  2015-01: COPD with exacerbation ([facility])  2015-01: Pneumonia  2015-01: Atrial fibrillation with RVR ([facility])  2013-12: Other and unspecified ovarian cyst  2011-06: Vertigo, benign positional  2011-05: B12 deficiency  2011-05: Hard of hearing  2009-08: Essential hypertension, benign  2009-08: Hypothyroidism  2009-08: Sinusitis due to Moraxella catarrhalis  2009-08: Allergic rhinitis  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  2009-07: Preventative health care  COPD (chronic obstructive pulmonary disease) ([facility])  Mixed  "incontinence urge and stress  CHI (closed head injury)  MDD (major depressive disorder), recurrent episode, moderate (HCC)  DJD (degenerative joint disease)  Asthma, mild intermittent, well-controlled  GERD (gastroesophageal reflux disease)  History of vulvar dysplasia  Heart murmur      Past Surgical History:  Past Surgical History:   Procedure Laterality Date   • GASTROSCOPY WITH BIOPSY N/A 5/7/2015    Procedure: GASTROSCOPY WITH BIOPSY;  Surgeon: Travis Jones M.D.;  Location: [facility];  Service:    • COLONOSCOPY WITH BIOPSY N/A 5/7/2015    Procedure: COLONOSCOPY WITH BIOPSY;  Surgeon: Travis Jones M.D.;  Location: [facility];  Service:    • HYSTERECTOMY ROBOTIC  12/27/2013    Performed by Zoltan Salazar M.D. at [facility]   • CYSTOSCOPY STENT PLACEMENT  12/27/2013    Performed by Zoltan Salazar M.D. at [facility]   • OTHER       Removal Rt sinus osteoma   • OTHER SURGICAL PROCEDURE      ORx3 cancer removal (vulvar Ca.)   • THYROIDECTOMY       Due to thyroid cancern   • TONSILLECTOMY          Allergies:  Ace inhibitors and Albuterol     Social History:  Social History     Tobacco Use   • Smoking status: Passive Smoke Exposure - Never Smoker   • Smokeless tobacco: Never Used   Substance Use Topics   • Alcohol use: Yes     Alcohol/week: 0.6 oz     Types: 1 Standard drinks or equivalent per week     Comment: rarely   • Drug use: No        Family History:   family history includes Cancer in her brother, father, sister, and sister; Heart Disease in her mother and sister; Other in her sister.      PHYSICAL EXAM:   Vital signs: /70 (BP Location: Left arm, Patient Position: Sitting, BP Cuff Size: Adult)   Pulse (!) 105   Ht 1.499 m (4' 11\")   Wt 53.2 kg (117 lb 3.2 oz)   LMP  (LMP Unknown)   SpO2 96%   BMI 23.67 kg/m²   GENERAL: Elderly female in no apparent distress.    EYE:  No ocular asymmetry, PERRLA  HENT: Pink, moist mucous membranes.  "   NECK: No thyromegaly.   CARDIOVASCULAR:  No murmurs  LUNGS: Clear breath sounds  BACK: No spinal tenderness on palpation  ABDOMEN: Soft, nontender   EXTREMITIES: No clubbing, cyanosis, or edema.   NEUROLOGICAL: No gross focal motor abnormalities   LYMPH: No cervical adenopathy palpated.   SKIN: No rashes, lesions.       Labs:    Lab Results   Component Value Date/Time    WBC 6.9 11/18/2019 02:26 PM    WBC 5.5 03/04/2011 11:11 AM    RBC 4.37 11/18/2019 02:26 PM    RBC 4.27 03/04/2011 11:11 AM    HEMOGLOBIN 12.8 11/18/2019 02:26 PM    MCV 87.0 11/18/2019 02:26 PM    MCV 91 03/04/2011 11:11 AM    MCH 29.3 11/18/2019 02:26 PM    MCH 30.9 03/04/2011 11:11 AM    MCHC 33.7 11/18/2019 02:26 PM    RDW 40.8 11/18/2019 02:26 PM    RDW 13.8 03/04/2011 11:11 AM    MPV 9.6 11/18/2019 02:26 PM       Lab Results   Component Value Date/Time    SODIUM 135 02/24/2020 03:47 PM    POTASSIUM 4.2 02/24/2020 03:47 PM    CHLORIDE 104 02/24/2020 03:47 PM    CO2 24 02/24/2020 03:47 PM    ANION 7.0 02/24/2020 03:47 PM    GLUCOSE 103 (H) 02/24/2020 03:47 PM    BUN 15 02/24/2020 03:47 PM    CREATININE 0.82 02/24/2020 03:47 PM    CREATININE 0.63 04/30/2013 12:03 PM    CALCIUM 9.2 02/24/2020 03:47 PM    ASTSGOT 12 02/24/2020 03:47 PM    ALTSGPT 11 02/24/2020 03:47 PM    TBILIRUBIN 0.4 02/24/2020 03:47 PM    ALBUMIN 3.9 02/24/2020 03:47 PM    ALBUMIN 4.10 02/24/2020 03:47 PM    TOTPROTEIN 7.0 02/24/2020 03:47 PM    TOTPROTEIN 6.8 02/24/2020 03:47 PM    GLOBULIN 3.1 02/24/2020 03:47 PM    AGRATIO 1.3 02/24/2020 03:47 PM       Lab Results   Component Value Date/Time    TSHULTRASEN <0.005 (L) 11/14/2019 1028     Lab Results   Component Value Date/Time    FREET4 2.08 (H) 11/14/2019 1028     Lab Results   Component Value Date/Time    FREET3 3.75 11/14/2019 1028     No results found for: THYSTIMIG        Imaging:        ASSESSMENT/PLAN:       1. Age-related osteoporosis with current pathological fracture with routine healing, subsequent  encounter  Unstable with elevated fracture risk due to severe osteoporosis  Continue Tymlos 80 mcg subcu daily recommend that she inject the medication at night to avoid side effects such as dizziness  Continue calcium and vitamin D supplementation  Recommend regular weightbearing exercise  We will plan for follow-up in 3 months because of her iatrogenic hyperthyroidism  I plan to repeat her calcium and vitamin D levels and PTH levels in 6 months      2. Compression fracture of T12 vertebra with routine healing  Stable  She is no longer in pain  Recommend observation  Recommend imaging of her back but I will defer this to her orthopedics physician      3. Bence Cleveland proteinuria  She has Bence-Cleveland proteins seen on UPEP  Continue follow-up with oncology  Bone marrow biopsy is being planned    4. Hypothyroidism, unspecified type  She remains iatrogenically hyperthyroid because of high doses of thyroid hormone given by another specialist  She is afraid of weaning down rapidly on her thyroid hormone replacement therapy which is a reasonable concern  I am adjusting her levothyroxine to 175 MCG daily  We will plan for repeat TSH levels in 3 months    5. Vitamin D deficiency  Controlled adequate vitamin D levels  Continue ergocalciferol 50,000 units weekly  We will plan for repeat calcium and vitamin D levels in 6 months      Return in about 3 months (around 6/4/2020).      Thank you kindly for allowing me to participate in the endocrine care plan for this patient.    Toñito Luevano MD, FACE, [facility]  12/04/19    CC:   Ezequiel Grewal M.D.

## 2020-03-09 ENCOUNTER — SLEEP CENTER VISIT (OUTPATIENT)
Dept: SLEEP MEDICINE | Facility: MEDICAL CENTER | Age: 85
End: 2020-03-09
Payer: MEDICARE

## 2020-03-09 VITALS
SYSTOLIC BLOOD PRESSURE: 128 MMHG | WEIGHT: 114 LBS | HEART RATE: 84 BPM | HEIGHT: 60 IN | OXYGEN SATURATION: 96 % | BODY MASS INDEX: 22.38 KG/M2 | DIASTOLIC BLOOD PRESSURE: 74 MMHG

## 2020-03-09 DIAGNOSIS — G47.33 OSA (OBSTRUCTIVE SLEEP APNEA): ICD-10-CM

## 2020-03-09 PROCEDURE — 99214 OFFICE O/P EST MOD 30 MIN: CPT | Performed by: NURSE PRACTITIONER

## 2020-03-09 ASSESSMENT — FIBROSIS 4 INDEX: FIB4 SCORE: 0.85

## 2020-03-09 NOTE — PATIENT INSTRUCTIONS
1) Continue CPAP at 6cmH20  2) Clean mask and supplies weekly and change them as insurance allows  3) mask fitting   4) Vaccines: Up to date with Prevnar 13, Pneumovax 23  5) Return in about 2 months (around 5/9/2020) for follow up with PHIL Martinez, if not sooner, Compliance.

## 2020-03-09 NOTE — PROGRESS NOTES
CC:  Here for f/u sleep issues as listed below    HPI:   Tania, who likes to be called Rossana,  presents today for follow up obstructive sleep apnea and asthma with sleep study results.  Past medical history of hypertension, hypothyroidism, atrial fibrillation, prediabetic, vitamin B12 deficiency, depression, heart murmur.     PSG from 6/2016 indicated an AHI of 67.1 and low oxygen of 84%.  Completed titration study in February 2020 for further evaluation.  She did best on a CPAP pressure of 6 during the study. She was unable to bring it to the office to drop the pressure from CPAP of 11.  She has not worn it in appx 8 months. She has struggled with with the mask off for 2+ years. Previously she was benefiting from the machine wearing it every single night for up to 8 hours.  She is currently sleeping 10 to 12 hours a night.  She does not take naps.  She falls asleep easily.  She understands the benefits of using the machine due to increased risk with untreated SEA.     She also has a history of Asthma with no complaints today. Please see last OV from 7/19/2017 for further details.  She travels to [place] to see her sisters.          Patient Active Problem List    Diagnosis Date Noted   • Atrial fibrillation (HCC) 05/01/2015     Priority: High   • Essential hypertension, benign 08/19/2009     Priority: High   • Hard of hearing 05/03/2011     Priority: Medium   • Asthma, mild intermittent, well-controlled      Priority: Medium   • GERD (gastroesophageal reflux disease)      Priority: Medium   • Heart murmur      Priority: Medium   • COPD (chronic obstructive pulmonary disease) ([facility])      Priority: Medium   • B12 deficiency 05/03/2011     Priority: Low   • Mixed incontinence urge and stress      Priority: Low   • MDD (major depressive disorder), recurrent episode, moderate ([facility])      Priority: Low   • DJD (degenerative joint disease)      Priority: Low   • History of vulvar dysplasia      Priority: Low   • Hypothyroidism  08/19/2009     Priority: Low   • Cough 01/23/2020   • Bence Cleveland proteinuria 12/04/2019   • Vitamin D deficiency 12/04/2019   • Compression fracture of T12 vertebra with routine healing 11/06/2019   • Breast pain, right 10/04/2019   • Poor balance 02/01/2019   • Osteoporosis 09/14/2016   • Seasonal allergies 09/14/2016   • Prediabetes 09/14/2016   • SEA on CPAP 07/07/2016       Past Medical History:   Diagnosis Date   • Allergic rhinitis    • Arthritis     Hands   • ASTHMA    • Atrial fibrillation (HCC) January 2015    New onset. Echocardiogram with normal LV size, mild concentric LVH, LVEF 65-70%. Normal RA and LA. Mild MR, mild TR, RVSP 35-40mmHg. June 2015: GI bleed, Coumadin stopped/contraindicated.   • B12 deficiency    • Cancer (HCC)     Vulvar; skin; thyroid   • CHI (closed head injury)    • COPD (chronic obstructive pulmonary disease) (HCC)    • Degenerative joint disease    • Dental disorder     One broken tooth   • Depression    • GERD (gastroesophageal reflux disease)    • Hemorrhoids    • [place] (hard of hearing)      Bilaterally   • HSV-1 infection    • Hypertension    • Hypothyroidism    • MEDICAL HOME    • Meningitis    • Osteoporosis    • Shingles    • Sinusitis due to Moraxella catarrhalis    • Stress incontinence    • Thyroid cancer (HCC)    • Vulvar cancer (HCC)        Past Surgical History:   Procedure Laterality Date   • GASTROSCOPY WITH BIOPSY N/A 5/7/2015    Procedure: GASTROSCOPY WITH BIOPSY;  Surgeon: Travis Jones M.D.;  Location: [facility];  Service:    • COLONOSCOPY WITH BIOPSY N/A 5/7/2015    Procedure: COLONOSCOPY WITH BIOPSY;  Surgeon: Travis Jones M.D.;  Location: [facility];  Service:    • HYSTERECTOMY ROBOTIC  12/27/2013    Performed by Zoltan Salazar M.D. at [facility]   • CYSTOSCOPY STENT PLACEMENT  12/27/2013    Performed by Zoltan Salazar M.D. at [facility]   • OTHER       Removal Rt sinus osteoma   • OTHER SURGICAL  PROCEDURE      ORx3 cancer removal (vulvar Ca.)   • THYROIDECTOMY       Due to thyroid cancern   • TONSILLECTOMY         Family History   Problem Relation Age of Onset   • Heart Disease Mother         [email or web address]   • Cancer Father         throat Cancer   • Heart Disease Sister    • Cancer Brother         brain tumor   • Cancer Sister         breast   • Cancer Sister         lung   • Other Sister         myeloma       Social History     Socioeconomic History   • Marital status:      Spouse name: Not on file   • Number of children: Not on file   • Years of education: Not on file   • Highest education level: Not on file   Occupational History   • Not on file   Social Needs   • Financial resource strain: Not on file   • Food insecurity     Worry: Never true     Inability: Never true   • Transportation needs     Medical: No     Non-medical: No   Tobacco Use   • Smoking status: Passive Smoke Exposure - Never Smoker   • Smokeless tobacco: Never Used   Substance and Sexual Activity   • Alcohol use: Yes     Alcohol/week: 0.6 oz     Types: 1 Standard drinks or equivalent per week     Comment: rarely   • Drug use: No   • Sexual activity: Never   Lifestyle   • Physical activity     Days per week: Not on file     Minutes per session: Not on file   • Stress: Not on file   Relationships   • Social connections     Talks on phone: Not on file     Gets together: Not on file     Attends [employer] service: Not on file     Active member of club or organization: Not on file     Attends meetings of clubs or organizations: Not on file     Relationship status: Not on file   • Intimate partner violence     Fear of current or ex partner: Not on file     Emotionally abused: Not on file     Physically abused: Not on file     Forced sexual activity: Not on file   Other Topics Concern   • Not on file   Social History Narrative   • Not on file       Current Outpatient Medications   Medication Sig Dispense Refill   • levothyroxine (SYNTHROID)  175 MCG Tab Take 1 Tab by mouth Every morning on an empty stomach. 90 Tab 1   • cyanocobalamin (VITAMIN B-12) 1000 MCG/ML Solution 1 mL by Intramuscular route every 7 days for 90 days. On Wed 12 Vial 0   • Cholecalciferol (VITAMIN D3) 25 MCG Tab Take  by mouth.     • vitamin D, Ergocalciferol, (DRISDOL) 27573 units Cap capsule Take 1 Cap by mouth every 7 days. 12 Cap 1   • omeprazole (PRILOSEC) 40 MG delayed-release capsule Take 1 Cap by mouth every day. 90 Cap 3   • oxybutynin SR (DITROPAN-XL) 5 MG TABLET SR 24 HR Take 1 Tab by mouth every day. 90 Tab 3   • levalbuterol (XOPENEX HFA) 45 MCG/ACT inhaler Inhale 1-2 Puffs by mouth every four hours as needed for Shortness of Breath. (Patient not taking: Reported on 3/9/2020) 1 Inhaler 2   • methylPREDNISolone (MEDROL DOSEPAK) 4 MG Tablet Therapy Pack As directed on the packaging label. (Patient not taking: Reported on 3/9/2020) 21 Tab 0   • POLYETHYLENE GLYCOL 3350 PO Take 17 g by mouth 1 time daily as needed. Indications: Constipation     • ezetimibe (ZETIA) 10 MG Tab Take 10 mg by mouth every evening.  3   • PARoxetine (PAXIL) 30 MG Tab Take 1 Tab by mouth every day. (Patient not taking: Reported on 3/9/2020) 90 Tab 3   • cetirizine (ZYRTEC) 10 MG Tab Take 10 mg by mouth every morning.       No current facility-administered medications for this visit.           Allergies: Ace inhibitors and Albuterol      ROS   Gen: Denies fever, chills, unintentional weight loss, fatigue  Resp:Denies Dyspnea  CV: Denies chest pain, chest tightness  Sleep:Denies morning headache, insomnia, daytime somnolence, snoring, gasping for air, apnea  Neuro: Denies frequent headaches, weakness, dizziness  See HPI.  All other systems reviewed and negative        Vital signs for this encounter:  Vitals:    03/09/20 1505   Height: 1.524 m (5')   Weight: 51.7 kg (114 lb)   Weight % change since last entry.: 0 %   BP: 128/74   Pulse: 84   BMI (Calculated): 22.26                   Physical Exam:    Gen:         Alert and oriented, No apparent distress.   Neck:        No Lymphadenopathy.  Lungs:     Clear to auscultation bilaterally.    CV:          Regular rate and rhythm. No murmurs, rubs or gallops.   Abd:         Soft non tender, non distended.            Ext:          No clubbing, cyanosis, edema.    Assessment   1. SEA (obstructive sleep apnea)  DME Mask and Supplies    DME Mask Fitting    DME Other       Patient is clinically stable and will proceed with following plan.  Face-to-face time greater than 50% of 25 minutes reviewing: Sleep study results and the need to start using her machine again.  Reviewed pathophysiology of untreated SEA including cardiac and neurologic risk factors.  Mask fitting completed.    PLAN:   Patient Instructions   1) Continue CPAP at 6cmH20  2) Clean mask and supplies weekly and change them as insurance allows  3) mask fitting   4) Vaccines: Up to date with Prevnar 13, Pneumovax 23  5) Return in about 2 months (around 5/9/2020) for follow up with PHIL Martinez, if not sooner, Compliance.

## 2020-03-10 LAB — TEST NAME 95000: NORMAL

## 2020-04-08 ENCOUNTER — HOSPITAL ENCOUNTER (OUTPATIENT)
Dept: RADIOLOGY | Facility: MEDICAL CENTER | Age: 85
End: 2020-04-08
Attending: INTERNAL MEDICINE
Payer: MEDICARE

## 2020-04-08 DIAGNOSIS — D47.2 MONOCLONAL PARAPROTEINEMIA: ICD-10-CM

## 2020-04-08 PROCEDURE — A9552 F18 FDG: HCPCS

## 2020-04-29 ENCOUNTER — TELEPHONE (OUTPATIENT)
Dept: HEALTH INFORMATION MANAGEMENT | Facility: OTHER | Age: 85
End: 2020-04-29

## 2020-05-04 ENCOUNTER — TELEPHONE (OUTPATIENT)
Dept: HEALTH INFORMATION MANAGEMENT | Facility: OTHER | Age: 85
End: 2020-05-04

## 2020-06-08 ENCOUNTER — SLEEP CENTER VISIT (OUTPATIENT)
Dept: SLEEP MEDICINE | Facility: MEDICAL CENTER | Age: 85
End: 2020-06-08
Payer: MEDICARE

## 2020-06-08 VITALS
SYSTOLIC BLOOD PRESSURE: 122 MMHG | HEART RATE: 76 BPM | OXYGEN SATURATION: 97 % | HEIGHT: 60 IN | BODY MASS INDEX: 22.78 KG/M2 | WEIGHT: 116 LBS | DIASTOLIC BLOOD PRESSURE: 66 MMHG

## 2020-06-08 DIAGNOSIS — I48.0 PAROXYSMAL ATRIAL FIBRILLATION (HCC): ICD-10-CM

## 2020-06-08 DIAGNOSIS — G47.33 OSA ON CPAP: ICD-10-CM

## 2020-06-08 DIAGNOSIS — I10 ESSENTIAL HYPERTENSION, BENIGN: ICD-10-CM

## 2020-06-08 PROCEDURE — 99213 OFFICE O/P EST LOW 20 MIN: CPT | Performed by: NURSE PRACTITIONER

## 2020-06-08 RX ORDER — ABALOPARATIDE 2000 UG/ML
INJECTION, SOLUTION SUBCUTANEOUS
COMMUNITY
Start: 2020-03-23 | End: 2021-01-01 | Stop reason: SDUPTHER

## 2020-06-08 ASSESSMENT — FIBROSIS 4 INDEX: FIB4 SCORE: 0.85

## 2020-06-08 NOTE — PROGRESS NOTES
Chief Complaint   Patient presents with   • Follow-Up     SEA- Last seen 03/09/2020       HPI:      Mrs. Aguilar is a 84 y/o female patient who is in today for three month SEA f/u. Past medical history of hypertension, hypothyroidism, atrial fibrillation, prediabetic, vitamin B12 deficiency, depression, heart murmur.    PSG from 6/2016 indicated an AHI of 67.1 and low oxygen of 84%.  Completed titration study in February 2020 for further evaluation. The PAP titration was initiated with CPAP 5 cm of water and the pressure which was slowly titrated up in an attempt to eliminate sleep disordered breathing and snoring. The final pressure tested during the study was CPAP 6 cm water and at this final pressure the patient was observed in the supine but not REM sleep stage. The apnea hypopnea index improved to 1.4 per hour and O2 nya 90%. The average O2 stauration was 93%. She spent 0.7 min of sleep time below 89% O2 saturation. Snoring was resolved.     Patient is accompanied by her son today who reports that the CPAP pressure has been decreased to 6 cmH2O. Has been compliance report from 1/8/20-2/6/20 was downloaded and reviewed with the patient which showed CPAP at 11 cmH2O, 3.3% compliance, 2 hrs 54 min use, AHI 5.8. She goes to bed between 9-10 pm and wakes up at 10 am.  She is able to sleep through the night without any problems.  She has been having trouble using the CPAP machine due to not tolerating the full face mask.  She never received her recommended mask from the [employer] company.  When she has a full facemask on she tends to pull it off her face and throw it across the room.  She would like to start using the CPAP machine because she does notice that she feels more rested and sleeps better.  She keeps busy with yard work and chores around her home.  She denies morning headaches or needing to take naps, and has been told she snores.  She uses a single-point cane for assistance with ambulation.  She denies any  new health problems or medications.      ROS:  Constitutional: Denies fevers, chills, sweats, fatigue, weight loss  Eyes: Denies glasses, vision loss, pain, drainage, double vision  Ears/Nose/Mouth/Throat: Denies rhinitis, nasal congestion, ear ache, difficulty hearing, sore throat, persistent hoarseness, decayed teeth/toothache  Cardiovascular: Denies chest pain, tightness, palpitations, swelling in feet/legs, fainting, difficulty breathing when laying down  Respiratory: Denies shortness of breath, cough, sputum, wheezing, painful breathing, coughing up blood  GI: Denies heartburn, difficulty swallowing, nausea, vomiting, abdominal pain, diarrhea, constiptation  : Denies frequent urination, painful urination  Integumentary: Denies rashes, lumps or color changes  Neurological: Denies frequent headaches, dizziness, weakness  Sleep: Denies daytime sleepiness, loud snoring, stops breathing during sleep, waking up gasping for air, insomnia, morning headaches      Past Medical History:   Diagnosis Date   • Allergic rhinitis    • Arthritis     Hands   • ASTHMA    • Atrial fibrillation (HCC) January 2015    New onset. Echocardiogram with normal LV size, mild concentric LVH, LVEF 65-70%. Normal RA and LA. Mild MR, mild TR, RVSP 35-40mmHg. June 2015: GI bleed, Coumadin stopped/contraindicated.   • B12 deficiency    • Cancer (HCC)     Vulvar; skin; thyroid   • CHI (closed head injury)    • COPD (chronic obstructive pulmonary disease) (HCC)    • Degenerative joint disease    • Dental disorder     One broken tooth   • Depression    • GERD (gastroesophageal reflux disease)    • Hemorrhoids    • [place] (hard of hearing)      Bilaterally   • HSV-1 infection    • Hypertension    • Hypothyroidism    • MEDICAL HOME    • Meningitis    • Osteoporosis    • Shingles    • Sinusitis due to Moraxella catarrhalis    • Stress incontinence    • Thyroid cancer (HCC)    • Vulvar cancer (HCC)        Past Surgical History:   Procedure Laterality  Date   • GASTROSCOPY WITH BIOPSY N/A 5/7/2015    Procedure: GASTROSCOPY WITH BIOPSY;  Surgeon: Travis Jones M.D.;  Location: SURGERY [facility];  Service:    • COLONOSCOPY WITH BIOPSY N/A 5/7/2015    Procedure: COLONOSCOPY WITH BIOPSY;  Surgeon: Travis Jones M.D.;  Location: SURGERY [facility];  Service:    • HYSTERECTOMY ROBOTIC  12/27/2013    Performed by Zoltan Salazar M.D. at SURGERY [facility]   • CYSTOSCOPY STENT PLACEMENT  12/27/2013    Performed by Zoltan [facility] M.D. at [facility]   • OTHER       Removal Rt sinus osteoma   • OTHER SURGICAL PROCEDURE      ORx3 cancer removal (vulvar Ca.)   • THYROIDECTOMY       Due to thyroid cancern   • TONSILLECTOMY         Family History   Problem Relation Age of Onset   • Heart Disease Mother         cva.[email or web address]   • Cancer Father         throat Cancer   • Heart Disease Sister    • Cancer Brother         brain tumor   • Cancer Sister         breast   • Cancer Sister         lung   • Other Sister         myeloma       Social History     Socioeconomic History   • Marital status:      Spouse name: Not on file   • Number of children: Not on file   • Years of education: Not on file   • Highest education level: Not on file   Occupational History   • Not on file   Social Needs   • Financial resource strain: Not on file   • Food insecurity     Worry: Never true     Inability: Never true   • Transportation needs     Medical: No     Non-medical: No   Tobacco Use   • Smoking status: Passive Smoke Exposure - Never Smoker   • Smokeless tobacco: Never Used   Substance and Sexual Activity   • Alcohol use: Yes     Alcohol/week: 0.6 oz     Types: 1 Standard drinks or equivalent per week     Comment: rarely   • Drug use: No   • Sexual activity: Never   Lifestyle   • Physical activity     Days per week: Not on file     Minutes per session: Not on file   • Stress: Not on file   Relationships   • Social connections     Talks on phone: Not on file      Gets together: Not on file     Attends [employer] service: Not on file     Active member of club or organization: Not on file     Attends meetings of clubs or organizations: Not on file     Relationship status: Not on file   • Intimate partner violence     Fear of current or ex partner: Not on file     Emotionally abused: Not on file     Physically abused: Not on file     Forced sexual activity: Not on file   Other Topics Concern   • Not on file   Social History Narrative   • Not on file       Allergies as of 06/08/2020 - Reviewed 06/08/2020   Allergen Reaction Noted   • Ace inhibitors  08/19/2009   • Albuterol  12/23/2013        Vitals:  Vitals:    06/08/20 1418   BP: 122/66   Pulse: 76   SpO2: 97%         Current medications as of today   Current Outpatient Medications   Medication Sig Dispense Refill   • ABALOPARATIDE 3120 MCG/1.56ML INJECT 80MCG SUBCUTANEOUSLY EVERY DAY     • levothyroxine (SYNTHROID) 175 MCG Tab Take 1 Tab by mouth Every morning on an empty stomach. 90 Tab 1   • Cholecalciferol (VITAMIN D3) 25 MCG Tab Take  by mouth.     • vitamin D, Ergocalciferol, (DRISDOL) 91486 units Cap capsule Take 1 Cap by mouth every 7 days. 12 Cap 1   • ezetimibe (ZETIA) 10 MG Tab Take 10 mg by mouth every evening.  3   • omeprazole (PRILOSEC) 40 MG delayed-release capsule Take 1 Cap by mouth every day. 90 Cap 3   • oxybutynin SR (DITROPAN-XL) 5 MG TABLET SR 24 HR Take 1 Tab by mouth every day. 90 Tab 3   • levalbuterol (XOPENEX HFA) 45 MCG/ACT inhaler Inhale 1-2 Puffs by mouth every four hours as needed for Shortness of Breath. (Patient not taking: Reported on 3/9/2020) 1 Inhaler 2   • methylPREDNISolone (MEDROL DOSEPAK) 4 MG Tablet Therapy Pack As directed on the packaging label. (Patient not taking: Reported on 3/9/2020) 21 Tab 0   • POLYETHYLENE GLYCOL 3350 PO Take 17 g by mouth 1 time daily as needed. Indications: Constipation     • PARoxetine (PAXIL) 30 MG Tab Take 1 Tab by mouth every day. (Patient not taking:  Reported on 3/9/2020) 90 Tab 3   • cetirizine (ZYRTEC) 10 MG Tab Take 10 mg by mouth every morning.       No current facility-administered medications for this visit.          Physical Exam:   Appearance: Well developed, well nourished, no acute distress  Eyes: PERRL, EOM intact, sclera white, conjunctiva moist  Ears: no lesions or deformities  Hearing: grossly intact  Nose: no lesions or deformities  Respiratory effort: no intercostal retractions or use of accessory muscles  Extremities: no cyanosis or edema  Abdomen: soft ,non tender, no masses  Gait and Station: normal  Digits and nails: no clubbing, cyanosis, petechiae or nodes.  Cranial nerves: grossly intact  Skin: no rashes, lesions or ulcers noted  Orientation: Oriented to time, person and place  Mood and affect: mood and affect appropriate, normal interaction with examiner  Judgement: Intact    Assessment:  1. SEA on CPAP     2. Paroxysmal atrial fibrillation (HCC)     3. Essential hypertension, benign     4. BMI 22.0-22.9, adult           Plan  The pathophysiology of sleep anea and the increased risk of cardiovascular morbidity from untreated sleep apnea is discussed in detail with the patient.     1. Compliance report from 1/8/20-2/6/20 was downloaded and reviewed with the patient which showed CPAP at 11 cmH2O, 3.3% compliance, 2 hrs 54 min use, AHI 5.8. Highly encouraged the patient to utilize CPAP for optimal health benefit.  Patient's CPAP pressure has been changed to 6 cmH2O since March 2020 office visit.    2. Patient will obtain F30i, small cushion and small frame mask herself as she is not able to obtain it at this time through the [employer] company due to non compliance.   3. BP today is 122/66, HR 76.   4. F/u in 3 months.     MARLEN Delacruz.      This dictation was created using voice recognition software. The accuracy of the dictation is limited to the abilities of the software. I expect there may be some errors of grammar and possibly  content.

## 2020-06-10 ENCOUNTER — OFFICE VISIT (OUTPATIENT)
Dept: ENDOCRINOLOGY | Facility: MEDICAL CENTER | Age: 85
End: 2020-06-10
Payer: MEDICARE

## 2020-06-10 ENCOUNTER — PATIENT OUTREACH (OUTPATIENT)
Dept: HEALTH INFORMATION MANAGEMENT | Facility: OTHER | Age: 85
End: 2020-06-10

## 2020-06-10 ENCOUNTER — HOSPITAL ENCOUNTER (OUTPATIENT)
Dept: LAB | Facility: MEDICAL CENTER | Age: 85
End: 2020-06-10
Attending: INTERNAL MEDICINE
Payer: MEDICARE

## 2020-06-10 VITALS
DIASTOLIC BLOOD PRESSURE: 80 MMHG | HEIGHT: 59 IN | SYSTOLIC BLOOD PRESSURE: 122 MMHG | WEIGHT: 114.8 LBS | BODY MASS INDEX: 23.14 KG/M2 | HEART RATE: 86 BPM | OXYGEN SATURATION: 96 %

## 2020-06-10 DIAGNOSIS — R80.3 BENCE JONES PROTEINURIA: ICD-10-CM

## 2020-06-10 DIAGNOSIS — M80.00XD AGE-RELATED OSTEOPOROSIS WITH CURRENT PATHOLOGICAL FRACTURE WITH ROUTINE HEALING, SUBSEQUENT ENCOUNTER: ICD-10-CM

## 2020-06-10 DIAGNOSIS — E55.9 VITAMIN D DEFICIENCY: ICD-10-CM

## 2020-06-10 DIAGNOSIS — S22.080D COMPRESSION FRACTURE OF T12 VERTEBRA WITH ROUTINE HEALING: ICD-10-CM

## 2020-06-10 DIAGNOSIS — E03.9 HYPOTHYROIDISM, UNSPECIFIED TYPE: ICD-10-CM

## 2020-06-10 LAB
ALBUMIN SERPL BCP-MCNC: 4.2 G/DL (ref 3.2–4.9)
ALBUMIN/GLOB SERPL: 1.4 G/DL
ALP SERPL-CCNC: 99 U/L (ref 30–99)
ALT SERPL-CCNC: 16 U/L (ref 2–50)
ANION GAP SERPL CALC-SCNC: 10 MMOL/L (ref 7–16)
AST SERPL-CCNC: 14 U/L (ref 12–45)
BILIRUB SERPL-MCNC: 0.3 MG/DL (ref 0.1–1.5)
BUN SERPL-MCNC: 16 MG/DL (ref 8–22)
CALCIUM SERPL-MCNC: 9.2 MG/DL (ref 8.5–10.5)
CHLORIDE SERPL-SCNC: 101 MMOL/L (ref 96–112)
CO2 SERPL-SCNC: 26 MMOL/L (ref 20–33)
CREAT SERPL-MCNC: 0.79 MG/DL (ref 0.5–1.4)
GLOBULIN SER CALC-MCNC: 3 G/DL (ref 1.9–3.5)
GLUCOSE SERPL-MCNC: 99 MG/DL (ref 65–99)
POTASSIUM SERPL-SCNC: 4.3 MMOL/L (ref 3.6–5.5)
PROT SERPL-MCNC: 7.2 G/DL (ref 6–8.2)
SODIUM SERPL-SCNC: 137 MMOL/L (ref 135–145)

## 2020-06-10 PROCEDURE — 80053 COMPREHEN METABOLIC PANEL: CPT

## 2020-06-10 PROCEDURE — 82306 VITAMIN D 25 HYDROXY: CPT

## 2020-06-10 PROCEDURE — 84439 ASSAY OF FREE THYROXINE: CPT

## 2020-06-10 PROCEDURE — 83970 ASSAY OF PARATHORMONE: CPT

## 2020-06-10 PROCEDURE — 82523 COLLAGEN CROSSLINKS: CPT | Mod: 91

## 2020-06-10 PROCEDURE — 99214 OFFICE O/P EST MOD 30 MIN: CPT | Performed by: INTERNAL MEDICINE

## 2020-06-10 PROCEDURE — 84443 ASSAY THYROID STIM HORMONE: CPT

## 2020-06-10 PROCEDURE — 83937 ASSAY OF OSTEOCALCIN: CPT

## 2020-06-10 PROCEDURE — 36415 COLL VENOUS BLD VENIPUNCTURE: CPT

## 2020-06-10 ASSESSMENT — FIBROSIS 4 INDEX: FIB4 SCORE: 0.85

## 2020-06-10 NOTE — PROGRESS NOTES
Returned members call as requested. No answer, left message. If the member calls in, please transfer to [phone]

## 2020-06-10 NOTE — PROGRESS NOTES
Member returned my call and requested a call back. I called the member and she stated that she needed a colonoscopy and wanted to know who to go to and what the steps were. I advised that I would place the referral request to an office that is in network and call her back once everything is ready to schedule. She understood and had no questions or concerns that needed to be addressed at this time. If the member calls in, please transfer to [email or web address].

## 2020-06-10 NOTE — PROGRESS NOTES
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 female here for follow up of the above medical issue.  In summary she has severe osteoporosis because of having a fragility fracture despite being on therapy with denosumab which was being given by another doctor.  Last bone density on November 21, 2019 showed the lowest T score of -2.7 for the left femur compatible with osteoporosis.  She was diagnosed with a T12 compression fracture on November 4, 2019 after a fall.    She is now on anabolic therapy with Tymlos 80 mcg subcu daily for her severe osteoporosis since Jan 2020.  She is tolerating the medication well and denies dizziness, lightheadedness and other side effects.  Her baseline PTH levels were normal.  She is also taking calcium 600 mg daily and ergocalciferol 50,000 units weekly.    Since last visit patient reports feeling good.  She denies interval fracture.  She denies interval fall.  She denies interval nephrolithiasis.  She reports adherence to calcium supplementation recommendations.  She reports adherence to vitamin D supplementation.  Her activity level: no regular exercise, sedentary      Her most recent vitamin D level is adequate at 56 with a normal calcium of 9.2 and normal alkaline phosphatase of 89 and normal PTH level of 26 on Feb 2020    I also diagnosed her with Bence-Cleveland proteinuria and referred her to hematology for evaluation and management.  She is now being seen by Dr. Sinha and is being monitored for MGUS      She has a history of iatrogenic hyperthyroidism because she was being given too much thyroid hormone by another endocrinologist despite her history of atrial fibrillation and osteoporosis.  Originally she was taking levothyroxine 200+ Cytomel 25 mcg once a day    On follow-up she is now taking  levothyroxine 175 MCG daily which has been her medication for the past 3 months  She is overdue for labs she forgot to get her TSH done to this visit  She denies palpitations, tremors, insomnia and other hyperthyroid symptoms      Patient's medications, allergies, and social histories were reviewed and updated as appropriate.      ROS:       CONS:     No fever, no chills   EYES:     No diplopia, no blurry vision   CV:           No chest pain, no palpitations   PULM:     No SOB, no cough, no hemoptysis.   GI:            No nausea, no vomiting, no diarrhea, no constipation   ENDO:     No polyuria, no polydipsia, no heat intolerance, no cold intolerance     Past Medical History:  Problem List:  2020-01: Cough  2019-12: Bence Cleveland proteinuria  2019-12: Vitamin D deficiency  2019-11: Compression fracture of T12 vertebra with routine healing  2019-10: Breast pain, right  2019-02: Poor balance  2017-07: Mild intermittent asthma without complication  2016-09: Osteoporosis  2016-09: Seasonal allergies  2016-09: Prediabetes  2016-07: SEA on CPAP  2015-11: Weight loss  2015-11: Diarrhea  2015-11: Neck stiffness  2015-08: Memory loss, short term  2015-06: COPD exacerbation ([facility])  2015-05: Anemia  2015-05: Acute gastrointestinal hemorrhage  2015-05: Abdominal pain, other specified site  2015-05: Nausea & vomiting  2015-05: Atrial fibrillation ([facility])  2015-05: Hyponatremia  2015-05: SBO (small bowel obstruction) ([facility])  2015-05: Supratherapeutic INR  2015-02: Chronic anticoagulation  2015-02: MEDICAL HOME  2015-01: COPD with exacerbation ([facility])  2015-01: Pneumonia  2015-01: Atrial fibrillation with RVR ([facility])  2013-12: Other and unspecified ovarian cyst  2011-06: Vertigo, benign positional  2011-05: B12 deficiency  2011-05: Hard of hearing  2009-08: Essential hypertension, benign  2009-08: Hypothyroidism  2009-08: Sinusitis due to Moraxella catarrhalis  2009-08: Allergic rhinitis  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  2009-07: Preventative  "health care  COPD (chronic obstructive pulmonary disease) (HCC)  Mixed incontinence urge and stress  CHI (closed head injury)  MDD (major depressive disorder), recurrent episode, moderate (HCC)  DJD (degenerative joint disease)  Asthma, mild intermittent, well-controlled  GERD (gastroesophageal reflux disease)  History of vulvar dysplasia  Heart murmur      Past Surgical History:  Past Surgical History:   Procedure Laterality Date   • GASTROSCOPY WITH BIOPSY N/A 5/7/2015    Procedure: GASTROSCOPY WITH BIOPSY;  Surgeon: Travis Jones M.D.;  Location: SURGERY [facility];  Service:    • COLONOSCOPY WITH BIOPSY N/A 5/7/2015    Procedure: COLONOSCOPY WITH BIOPSY;  Surgeon: Travis Jones M.D.;  Location: [facility];  Service:    • HYSTERECTOMY ROBOTIC  12/27/2013    Performed by Zoltan Salazar M.D. at [facility]   • CYSTOSCOPY STENT PLACEMENT  12/27/2013    Performed by Zoltan Salazar M.D. at [facility]   • OTHER       Removal Rt sinus osteoma   • OTHER SURGICAL PROCEDURE      ORx3 cancer removal (vulvar Ca.)   • THYROIDECTOMY       Due to thyroid cancern   • TONSILLECTOMY          Allergies:  Ace inhibitors and Albuterol     Social History:  Social History     Tobacco Use   • Smoking status: Passive Smoke Exposure - Never Smoker   • Smokeless tobacco: Never Used   Substance Use Topics   • Alcohol use: Yes     Alcohol/week: 0.6 oz     Types: 1 Standard drinks or equivalent per week     Comment: rarely   • Drug use: No        Family History:   family history includes Cancer in her brother, father, sister, and sister; Heart Disease in her mother and sister; Other in her sister.      PHYSICAL EXAM:   Vital signs: /80 (BP Location: Left arm, Patient Position: Sitting, BP Cuff Size: Adult)   Pulse 86   Ht 1.499 m (4' 11\")   Wt 52.1 kg (114 lb 12.8 oz)   LMP  (LMP Unknown)   SpO2 96%   BMI 23.19 kg/m²   GENERAL: Elderly female in no apparent distress.    EYE:  No " ocular asymmetry, PERRLA  HENT: Pink, moist mucous membranes.    NECK: No thyromegaly.   CARDIOVASCULAR:  No murmurs  LUNGS: Clear breath sounds  BACK: No spinal tenderness on palpation  ABDOMEN: Soft, nontender   EXTREMITIES: No clubbing, cyanosis, or edema.   NEUROLOGICAL: No gross focal motor abnormalities   LYMPH: No cervical adenopathy palpated.   SKIN: No rashes, lesions.       Labs:    Lab Results   Component Value Date/Time    WBC 6.9 11/18/2019 02:26 PM    WBC 5.5 03/04/2011 11:11 AM    RBC 4.37 11/18/2019 02:26 PM    RBC 4.27 03/04/2011 11:11 AM    HEMOGLOBIN 12.8 11/18/2019 02:26 PM    MCV 87.0 11/18/2019 02:26 PM    MCV 91 03/04/2011 11:11 AM    MCH 29.3 11/18/2019 02:26 PM    MCH 30.9 03/04/2011 11:11 AM    MCHC 33.7 11/18/2019 02:26 PM    RDW 40.8 11/18/2019 02:26 PM    RDW 13.8 03/04/2011 11:11 AM    MPV 9.6 11/18/2019 02:26 PM       Lab Results   Component Value Date/Time    SODIUM 135 02/24/2020 03:47 PM    POTASSIUM 4.2 02/24/2020 03:47 PM    CHLORIDE 104 02/24/2020 03:47 PM    CO2 24 02/24/2020 03:47 PM    ANION 7.0 02/24/2020 03:47 PM    GLUCOSE 103 (H) 02/24/2020 03:47 PM    BUN 15 02/24/2020 03:47 PM    CREATININE 0.82 02/24/2020 03:47 PM    CREATININE 0.63 04/30/2013 12:03 PM    CALCIUM 9.2 02/24/2020 03:47 PM    ASTSGOT 12 02/24/2020 03:47 PM    ALTSGPT 11 02/24/2020 03:47 PM    TBILIRUBIN 0.4 02/24/2020 03:47 PM    ALBUMIN 3.9 02/24/2020 03:47 PM    ALBUMIN 4.10 02/24/2020 03:47 PM    TOTPROTEIN 7.0 02/24/2020 03:47 PM    TOTPROTEIN 6.8 02/24/2020 03:47 PM    GLOBULIN 3.1 02/24/2020 03:47 PM    AGRATIO 1.3 02/24/2020 03:47 PM       Lab Results   Component Value Date/Time    TSHULTRASEN <0.005 (L) 11/14/2019 1028     Lab Results   Component Value Date/Time    FREET4 2.08 (H) 11/14/2019 1028     Lab Results   Component Value Date/Time    FREET3 3.75 11/14/2019 1028     No results found for: THYSTIMIG        Imaging:        ASSESSMENT/PLAN:       1. Age-related osteoporosis with current  pathological fracture with routine healing, subsequent encounter  Unstable with elevated fracture risk due to severe osteoporosis  Continue Tymlos 80 mcg subcu daily   Continue calcium and vitamin D supplementation  Recommend regular weightbearing exercise  I am getting labs today and I will update her  We will plan for follow-up in 3 months with a repeat of her calcium 25-hydroxy vitamin D and PTH      2. Compression fracture of T12 vertebra with routine healing  Stable  She is no longer in pain  Recommend observation  Recommend imaging of her back but I will defer this to her orthopedic surgeon    3. Bence Cleveland proteinuria  She has MGUS now being followed by Dr. Sinha with oncology  Continue observation and monitoring of her hematologic parameters    4. Hypothyroidism, unspecified type  She is overdue for labs  She was previously iatrogenically hyperthyroid when she was given high doses of Synthroid and Cytomel by another doctor  I am getting a TSH today and I will update her  I am going to adjust her levothyroxine 175 mcg dose depending upon her labs  We will plan for follow-up in 3 months    5. Vitamin D deficiency  Controlled adequate vitamin D levels  Continue ergocalciferol 50,000 units weekly  We will plan for repeat calcium and vitamin D levels in 3 months      Return in about 3 months (around 9/10/2020).      Thank you kindly for allowing me to participate in the endocrine care plan for this patient.    Toñito Luevano MD, FACE, ECN  12/04/19    CC:   Ezequiel Grewal M.D.

## 2020-06-11 LAB
25(OH)D3 SERPL-MCNC: 32 NG/ML (ref 30–100)
PTH-INTACT SERPL-MCNC: 20.7 PG/ML (ref 14–72)
T4 FREE SERPL-MCNC: 2.48 NG/DL (ref 0.93–1.7)
TSH SERPL DL<=0.005 MIU/L-ACNC: <0.005 UIU/ML (ref 0.38–5.33)

## 2020-06-11 RX ORDER — LEVOTHYROXINE SODIUM 137 UG/1
137 TABLET ORAL
Qty: 90 TAB | Refills: 2 | Status: SHIPPED | OUTPATIENT
Start: 2020-06-11 | End: 2020-09-09

## 2020-06-11 NOTE — PROGRESS NOTES
Called member to follow up on her issue and her call back request. The member stated she does in fact have a referral to get a Colonoscopy. I advised to call [facility] and schedule from there. She understood and had no questions or concerns at this time. If the member calls back, please transfer to [id number].

## 2020-06-12 ENCOUNTER — TELEPHONE (OUTPATIENT)
Dept: ENDOCRINOLOGY | Facility: MEDICAL CENTER | Age: 85
End: 2020-06-12

## 2020-06-12 LAB
COLLAGEN NTX/CREAT UR-SRTO: 69
CREAT 24H UR-MCNC: 27 MG/DL

## 2020-06-12 NOTE — TELEPHONE ENCOUNTER
Phone Number Called: [phone]    Call outcome: Left detailed message for patient. Informed to call back with any additional questions.    Message: Contacted patient but there was no answer. I let patient know Dr. Luevano sent her a [employer] message regarding her lab results. I asked patient to either call us back or send us a [employer] message if she had any questions.

## 2020-06-12 NOTE — TELEPHONE ENCOUNTER
----- Message from Toñito Luevano M.D. sent at 6/11/2020 10:57 PM PDT -----  To Ms. Aguilar    Your labs show 175mcg of thyroid hormone is still way too strong for you    We need to reduce your medication to 137 once a day    As you kknow too much thyroid hormone causes bone loss and heart rhythm  issues    Your other labs are still pending    please continue the rest of your meds    I am sending the new thyroid dose to your pharmacy locally - please stop the 175 dose immediately    Thank you    Dr. Luevano

## 2020-06-13 LAB
COLLAGEN CTX SERPL-MCNC: 794 PG/ML
MISCELLANEOUS LAB RESULT MISCLAB: ABNORMAL

## 2020-07-20 DIAGNOSIS — E53.8 B12 DEFICIENCY: ICD-10-CM

## 2020-07-20 RX ORDER — CYANOCOBALAMIN 1000 UG/ML
1000 INJECTION, SOLUTION INTRAMUSCULAR; SUBCUTANEOUS
Qty: 1 ML | Refills: 11 | Status: SHIPPED | OUTPATIENT
Start: 2020-07-20 | End: 2020-08-19

## 2020-08-05 ENCOUNTER — PATIENT OUTREACH (OUTPATIENT)
Dept: HEALTH INFORMATION MANAGEMENT | Facility: OTHER | Age: 85
End: 2020-08-05

## 2020-08-05 NOTE — PROGRESS NOTES
Called member to schedule over due AWV with PCP. No answer, no VM set up. If the member calls in, please transfer to [phone]

## 2020-08-06 NOTE — PROGRESS NOTES
Called member to schedule over due AWV with PCP. No answer, no VM set up. If the member calls in, please transfer to [email or web address]

## 2020-08-12 NOTE — PROGRESS NOTES
Outcome: Left Message to call back to schedule overdue AWV with PCP    Please transfer to Patient Outreach Team at [phone] when patient returns call.    [facility] Verified: yes    Attempt # 3

## 2020-08-19 ENCOUNTER — HOSPITAL ENCOUNTER (OUTPATIENT)
Dept: LAB | Facility: MEDICAL CENTER | Age: 85
End: 2020-08-19
Attending: INTERNAL MEDICINE
Payer: MEDICARE

## 2020-08-19 LAB
ALBUMIN SERPL BCP-MCNC: 4 G/DL (ref 3.2–4.9)
ALBUMIN/GLOB SERPL: 1.3 G/DL
ALP SERPL-CCNC: 98 U/L (ref 30–99)
ALT SERPL-CCNC: 13 U/L (ref 2–50)
ANION GAP SERPL CALC-SCNC: 11 MMOL/L (ref 7–16)
AST SERPL-CCNC: 11 U/L (ref 12–45)
BASOPHILS # BLD AUTO: 0.9 % (ref 0–1.8)
BASOPHILS # BLD: 0.04 K/UL (ref 0–0.12)
BILIRUB SERPL-MCNC: 0.3 MG/DL (ref 0.1–1.5)
BUN SERPL-MCNC: 11 MG/DL (ref 8–22)
CALCIUM SERPL-MCNC: 8.9 MG/DL (ref 8.5–10.5)
CHLORIDE SERPL-SCNC: 100 MMOL/L (ref 96–112)
CO2 SERPL-SCNC: 25 MMOL/L (ref 20–33)
CREAT SERPL-MCNC: 0.94 MG/DL (ref 0.5–1.4)
EOSINOPHIL # BLD AUTO: 0.16 K/UL (ref 0–0.51)
EOSINOPHIL NFR BLD: 3.7 % (ref 0–6.9)
ERYTHROCYTE [DISTWIDTH] IN BLOOD BY AUTOMATED COUNT: 41.8 FL (ref 35.9–50)
GLOBULIN SER CALC-MCNC: 3.1 G/DL (ref 1.9–3.5)
GLUCOSE SERPL-MCNC: 123 MG/DL (ref 65–99)
HCT VFR BLD AUTO: 41.3 % (ref 37–47)
HGB BLD-MCNC: 13.5 G/DL (ref 12–16)
IMM GRANULOCYTES # BLD AUTO: 0.01 K/UL (ref 0–0.11)
IMM GRANULOCYTES NFR BLD AUTO: 0.2 % (ref 0–0.9)
LYMPHOCYTES # BLD AUTO: 1.39 K/UL (ref 1–4.8)
LYMPHOCYTES NFR BLD: 32 % (ref 22–41)
MCH RBC QN AUTO: 29.6 PG (ref 27–33)
MCHC RBC AUTO-ENTMCNC: 32.7 G/DL (ref 33.6–35)
MCV RBC AUTO: 90.6 FL (ref 81.4–97.8)
MONOCYTES # BLD AUTO: 0.35 K/UL (ref 0–0.85)
MONOCYTES NFR BLD AUTO: 8 % (ref 0–13.4)
NEUTROPHILS # BLD AUTO: 2.4 K/UL (ref 2–7.15)
NEUTROPHILS NFR BLD: 55.2 % (ref 44–72)
NRBC # BLD AUTO: 0 K/UL
NRBC BLD-RTO: 0 /100 WBC
PLATELET # BLD AUTO: 302 K/UL (ref 164–446)
PMV BLD AUTO: 9.5 FL (ref 9–12.9)
POTASSIUM SERPL-SCNC: 4.4 MMOL/L (ref 3.6–5.5)
PROT SERPL-MCNC: 7.1 G/DL (ref 6–8.2)
RBC # BLD AUTO: 4.56 M/UL (ref 4.2–5.4)
SODIUM SERPL-SCNC: 136 MMOL/L (ref 135–145)
WBC # BLD AUTO: 4.4 K/UL (ref 4.8–10.8)

## 2020-08-19 PROCEDURE — 82232 ASSAY OF BETA-2 PROTEIN: CPT

## 2020-08-19 PROCEDURE — 84156 ASSAY OF PROTEIN URINE: CPT

## 2020-08-19 PROCEDURE — 82784 ASSAY IGA/IGD/IGG/IGM EACH: CPT

## 2020-08-19 PROCEDURE — 83520 IMMUNOASSAY QUANT NOS NONAB: CPT | Mod: 91

## 2020-08-19 PROCEDURE — 85025 COMPLETE CBC W/AUTO DIFF WBC: CPT

## 2020-08-19 PROCEDURE — 84165 PROTEIN E-PHORESIS SERUM: CPT

## 2020-08-19 PROCEDURE — 84155 ASSAY OF PROTEIN SERUM: CPT | Mod: XU

## 2020-08-19 PROCEDURE — 80053 COMPREHEN METABOLIC PANEL: CPT

## 2020-08-19 PROCEDURE — 36415 COLL VENOUS BLD VENIPUNCTURE: CPT

## 2020-08-19 PROCEDURE — 86334 IMMUNOFIX E-PHORESIS SERUM: CPT

## 2020-08-21 ENCOUNTER — NURSE TRIAGE (OUTPATIENT)
Dept: HEALTH INFORMATION MANAGEMENT | Facility: OTHER | Age: 85
End: 2020-08-21

## 2020-08-21 LAB — B2 MICROGLOB SERPL-MCNC: 3.5 MG/L (ref 1.1–2.4)

## 2020-08-21 NOTE — TELEPHONE ENCOUNTER
C/o pain in left external ear for a couple of weeks and now intermediate to chin. Swollen, painful, red. Advised pt that she needs to go to the Emergency Room. She said she will have to call her daughter for transportation to ER.

## 2020-08-22 ENCOUNTER — OFFICE VISIT (OUTPATIENT)
Dept: URGENT CARE | Facility: CLINIC | Age: 85
End: 2020-08-22
Payer: MEDICARE

## 2020-08-22 VITALS
OXYGEN SATURATION: 99 % | HEART RATE: 86 BPM | DIASTOLIC BLOOD PRESSURE: 80 MMHG | SYSTOLIC BLOOD PRESSURE: 142 MMHG | WEIGHT: 115 LBS | BODY MASS INDEX: 23.18 KG/M2 | HEIGHT: 59 IN | TEMPERATURE: 98.4 F | RESPIRATION RATE: 16 BRPM

## 2020-08-22 DIAGNOSIS — B02.9 HERPES ZOSTER WITHOUT COMPLICATION: ICD-10-CM

## 2020-08-22 PROCEDURE — 99214 OFFICE O/P EST MOD 30 MIN: CPT | Performed by: PHYSICIAN ASSISTANT

## 2020-08-22 RX ORDER — VALACYCLOVIR HYDROCHLORIDE 1 G/1
1000 TABLET, FILM COATED ORAL 3 TIMES DAILY
Qty: 30 TAB | Refills: 0 | Status: SHIPPED | OUTPATIENT
Start: 2020-08-22 | End: 2020-09-11 | Stop reason: SDUPTHER

## 2020-08-22 ASSESSMENT — ENCOUNTER SYMPTOMS
SORE THROAT: 0
GASTROINTESTINAL NEGATIVE: 1
COUGH: 0
SHORTNESS OF BREATH: 0
WHEEZING: 0
FEVER: 0
CHILLS: 0
HEADACHES: 0

## 2020-08-22 ASSESSMENT — FIBROSIS 4 INDEX: FIB4 SCORE: 0.85

## 2020-08-22 NOTE — PROGRESS NOTES
Subjective:   Tania Aguilar is a 85 y.o. female who presents for Otalgia (LEFT x3 weeks, worsening over past 2 days, pain into neck )      Otalgia   Pertinent negatives include no coughing, ear discharge, headaches or sore throat.   Patient is a 85-year-old female who presents with left ear pain to the outside of the ear, left neck soreness onset 3 weeks ago.  Worsening over the past couple days.  Pain is moderate exacerbated with touching her skin.  Associated swelling and mild redness.  She wears hearing aids and is having trouble keeping her a hearing aid in her left ear.  No drainage, fevers, chills, trouble swallowing, headache, chest pain, shortness of breath.  Has not tried anything for symptoms.    History of shingles on right hand.     Review of Systems   Constitutional: Negative for chills and fever.   HENT: Positive for ear pain. Negative for ear discharge and sore throat.    Respiratory: Negative for cough, shortness of breath and wheezing.    Cardiovascular: Negative for chest pain.   Gastrointestinal: Negative.    Neurological: Negative for headaches.       Medications:    • abaloparatide  • cetirizine Tabs  • ezetimibe Tabs  • levothyroxine Tabs  • omeprazole  • oxybutynin SR Tb24  • POLYETHYLENE GLYCOL 3350 PO  • vitamin D (Ergocalciferol) Caps  • Vitamin D3 Tabs    Allergies: Ace inhibitors and Albuterol    Problem List: Tania Aguilar has Essential hypertension, benign; Hypothyroidism; COPD (chronic obstructive pulmonary disease) ([facility]); B12 deficiency; Hard of hearing; Mixed incontinence urge and stress; MDD (major depressive disorder), recurrent episode, moderate ([facility]); DJD (degenerative joint disease); Asthma, mild intermittent, well-controlled; GERD (gastroesophageal reflux disease); History of vulvar dysplasia; Heart murmur; Atrial fibrillation ([facility]); SEA on CPAP; Osteoporosis; Seasonal allergies; Prediabetes; Poor balance; Breast pain, right; Compression fracture of T12 vertebra  "with routine healing; [place]morenita Cleveland proteinuria; Vitamin D deficiency; and Cough on their problem list.    Surgical History:  Past Surgical History:   Procedure Laterality Date   • GASTROSCOPY WITH BIOPSY N/A 5/7/2015    Procedure: GASTROSCOPY WITH BIOPSY;  Surgeon: Travis Jones M.D.;  Location: [facility];  Service:    • COLONOSCOPY WITH BIOPSY N/A 5/7/2015    Procedure: COLONOSCOPY WITH BIOPSY;  Surgeon: Travis Jones M.D.;  Location: [facility];  Service:    • HYSTERECTOMY ROBOTIC  12/27/2013    Performed by Zoltan Salazar M.D. at SURGERY [facility]   • CYSTOSCOPY STENT PLACEMENT  12/27/2013    Performed by Zoltan Salazar M.D. at SURGERY [facility]   • OTHER       Removal Rt sinus osteoma   • OTHER SURGICAL PROCEDURE      ORx3 cancer removal (vulvar Ca.)   • THYROIDECTOMY       Due to thyroid cancern   • TONSILLECTOMY         Past Social Hx: Tania Aguilar  reports that she is a non-smoker but has been exposed to tobacco smoke. She has been exposed to 0.00 packs per day. She has never used smokeless tobacco. She reports current alcohol use of about 0.6 oz of alcohol per week. She reports that she does not use drugs.     Past Family Hx:  Tania Aguilar family history includes Cancer in her brother, father, sister, and sister; Heart Disease in her mother and sister; Other in her sister.     Problem list, medications, and allergies reviewed by myself today in Epic.     Objective:     /80   Pulse 86   Temp 36.9 °C (98.4 °F) (Temporal)   Resp 16   Ht 1.499 m (4' 11\")   Wt 52.2 kg (115 lb)   LMP  (LMP Unknown)   SpO2 99%   BMI 23.23 kg/m²     Physical Exam  Vitals signs reviewed.   Constitutional:       General: She is not in acute distress.     Appearance: She is not ill-appearing or toxic-appearing.   HENT:      Right Ear: External ear normal. There is impacted cerumen.      Left Ear: Tympanic membrane normal.      Ears:        Comments: Mild to " moderate edema to left external ear canal as well as the pinna.   Mild erythema.  Small multiple excoriations to pinna  Tenderness to palpation  There is no fluctuance, crepitus, drainage.      Nose: Nose normal.      Mouth/Throat:      Mouth: Mucous membranes are moist.      Pharynx: Oropharynx is clear. No oropharyngeal exudate or posterior oropharyngeal erythema.   Eyes:      Conjunctiva/sclera: Conjunctivae normal.      Pupils: Pupils are equal, round, and reactive to light.   Neck:        Comments: Multiple small papular, vesicular lesions with some excoriations to left neck and superficial cervical plexus dermatomal position.  Trace erythema   No edema no fluctuance or drainage  Tenderness to palpation.  Neurological:      Mental Status: She is alert.         Assessment/Plan:     Diagnosis and associated orders:     1. Herpes zoster without complication  valacyclovir (VALTREX) 1 GM Tab      Comments/MDM:     • Discussed with patient signs symptoms are consistent with herpes zoster.   • Treatment of valacyclovir for 10 days.  Plenty of fluids, she may apply Polysporin once or twice to the area, Tylenol for pain.   • Return to the urgent care if symptoms are not improving in the next 2 to 3 days or any worsening symptoms.        Red flags discussed   Supportive care, differential diagnoses, and indications for immediate follow-up discussed with patient.    Pathogenesis of diagnosis discussed including typical length and natural progression. Patient expresses understanding and agrees to plan.    Advised the patient to follow-up with the primary care physician for recheck, reevaluation, and consideration of further management.    Please note that this dictation was created using voice recognition software. I have made a reasonable attempt to correct obvious errors, but I expect that there are errors of grammar and possibly content that I did not discover before finalizing the note.    This note was electronically  signed by Shakeel Haji PA-C

## 2020-08-23 DIAGNOSIS — E55.9 VITAMIN D DEFICIENCY: ICD-10-CM

## 2020-08-24 ENCOUNTER — NURSE TRIAGE (OUTPATIENT)
Dept: HEALTH INFORMATION MANAGEMENT | Facility: OTHER | Age: 85
End: 2020-08-24

## 2020-08-24 LAB
ALBUMIN 24H MFR UR ELPH: 59.5 %
ALBUMIN SERPL ELPH-MCNC: 3.83 G/DL (ref 3.75–5.01)
ALPHA1 GLOB 24H MFR UR ELPH: 23.6 %
ALPHA1 GLOB SERPL ELPH-MCNC: 0.3 G/DL (ref 0.19–0.46)
ALPHA2 GLOB 24H MFR UR ELPH: 11 %
ALPHA2 GLOB SERPL ELPH-MCNC: 0.73 G/DL (ref 0.48–1.05)
B-GLOBULIN 24H MFR UR ELPH: 5.9 %
B-GLOBULIN SERPL ELPH-MCNC: 0.55 G/DL (ref 0.48–1.1)
COLLECT DURATION TIME SPEC: NORMAL HRS
EER MONOCLONAL PROTEIN AND FLC, SERUM Q5224: ABNORMAL
EER MONOCLONAL PROTEIN STUDY, 24 HOUR U Q5964: NORMAL
GAMMA GLOB 24H MFR UR ELPH: 0 %
GAMMA GLOB SERPL ELPH-MCNC: 1.19 G/DL (ref 0.62–1.51)
IGA SERPL-MCNC: 16 MG/DL (ref 68–408)
IGG SERPL-MCNC: 1480 MG/DL (ref 768–1632)
IGM SERPL-MCNC: 15 MG/DL (ref 35–263)
INTERPRETATION SERPL IFE-IMP: ABNORMAL
INTERPRETATION SERPL IFE-IMP: ABNORMAL
INTERPRETATION UR IFE-IMP: NORMAL
KAPPA LC FREE 24H UR-MRATE: NORMAL MG/D
KAPPA LC FREE SER-MCNC: 43.55 MG/L (ref 3.3–19.4)
KAPPA LC FREE UR-MCNC: 14.86 MG/L (ref 0–32.9)
KAPPA LC FREE/LAMBDA FREE SER NEPH: 7.53 {RATIO} (ref 0.26–1.65)
LAMBDA LC FREE 24H UR-MRATE: NORMAL MG/D
LAMBDA LC FREE SERPL-MCNC: 5.78 MG/L (ref 5.71–26.3)
LAMBDA LC FREE UR-MCNC: <0.74 MG/L (ref 0–3.79)
M PROTEIN 24H MFR UR ELPH: 0 %
M PROTEIN 24H UR ELPH-MRATE: NORMAL MG/24 HRS
PROT 24H UR-MRATE: NORMAL MG/D
PROT 24H UR-MRATE: NORMAL MG/D (ref 40–150)
PROT SERPL-MCNC: 6.6 G/DL (ref 6.3–8.2)
PROT UR-MCNC: 4 MG/DL
SPECIMEN VOL ?TM UR: NORMAL ML

## 2020-08-24 RX ORDER — ERGOCALCIFEROL 1.25 MG/1
CAPSULE ORAL
Qty: 12 CAP | Refills: 0 | Status: SHIPPED | OUTPATIENT
Start: 2020-08-24 | End: 2020-11-11 | Stop reason: SDUPTHER

## 2020-08-24 NOTE — TELEPHONE ENCOUNTER
Received request via: Pharmacy    Was the patient seen in the last year in this department? Yes    Does the patient have an active prescription (recently filled or refills available) for medication(s) requested? No       vitamin D, Ergocalciferol, (DRISDOL) 1.25 MG (24400 UT) Cap capsule    Sig: TAKE 1 CAPSULE BY MOUTH  EVERY 7 DAYS

## 2020-08-24 NOTE — TELEPHONE ENCOUNTER
Regarding: ear pain - #Negative on COVID-19 screening questions#   ----- Message from Akiko Beckman sent at 8/21/2020  4:36 PM PDT -----  Pt is having ear pain, wants to schedule an appt with pcp. #Negative on COVID-19 screening questions#    Spoke with Shirley in Nurse advice on 8/21/20202. See her note for disposition

## 2020-08-25 ENCOUNTER — OFFICE VISIT (OUTPATIENT)
Dept: MEDICAL GROUP | Facility: MEDICAL CENTER | Age: 85
End: 2020-08-25
Payer: MEDICARE

## 2020-08-25 VITALS
WEIGHT: 118 LBS | SYSTOLIC BLOOD PRESSURE: 130 MMHG | OXYGEN SATURATION: 95 % | BODY MASS INDEX: 23.79 KG/M2 | HEART RATE: 93 BPM | HEIGHT: 59 IN | TEMPERATURE: 98.1 F | DIASTOLIC BLOOD PRESSURE: 78 MMHG

## 2020-08-25 DIAGNOSIS — B02.9 HERPES ZOSTER WITHOUT COMPLICATION: ICD-10-CM

## 2020-08-25 PROCEDURE — 99214 OFFICE O/P EST MOD 30 MIN: CPT | Performed by: FAMILY MEDICINE

## 2020-08-25 RX ORDER — LIDOCAINE 50 MG/G
1 OINTMENT TOPICAL EVERY 4 HOURS PRN
Qty: 50 G | Refills: 1 | Status: SHIPPED | OUTPATIENT
Start: 2020-08-25 | End: 2020-10-05

## 2020-08-25 RX ORDER — LIDOCAINE 50 MG/G
1 OINTMENT TOPICAL EVERY 4 HOURS PRN
Qty: 50 G | Refills: 1 | Status: SHIPPED | OUTPATIENT
Start: 2020-08-25 | End: 2020-08-25 | Stop reason: SDUPTHER

## 2020-08-25 ASSESSMENT — FIBROSIS 4 INDEX: FIB4 SCORE: 0.85

## 2020-08-25 NOTE — ASSESSMENT & PLAN NOTE
For the last 1-2 weeks she was having pain on left side of face/left ear, felt like it was on fire and could not get a hearing aid in because of sensitivity. Went to urgent care 3 days ago and started on Valtrex. She is complaining of persistent pain that is throbbing, burning and itching, overall symptoms have improved.

## 2020-08-25 NOTE — PROGRESS NOTES
Subjective:   Tania Aguilar is a 85 y.o. female here today for herpes zoster without complications    Herpes zoster without complication  For the last 1-2 weeks she was having pain on left side of face/left ear, felt like it was on fire and could not get a hearing aid in because of sensitivity. Went to urgent care 3 days ago and started on Valtrex. She is complaining of persistent pain that is throbbing, burning and itching, overall symptoms have improved.         Current medicines (including changes today)  Current Outpatient Medications   Medication Sig Dispense Refill   • lidocaine (XYLOCAINE) 5 % Ointment Apply 1 g to affected area(s) every four hours as needed (shingles pain on left side of neck/face). 50 g 1   • vitamin D, Ergocalciferol, (DRISDOL) 1.25 MG (39590 UT) Cap capsule TAKE 1 CAPSULE BY MOUTH  EVERY 7 DAYS 12 Cap 0   • valacyclovir (VALTREX) 1 GM Tab Take 1 Tab by mouth 3 times a day for 10 days. 30 Tab 0   • oxybutynin SR (DITROPAN-XL) 5 MG TABLET SR 24 HR Take 1 tablet by mouth once daily 90 Tab 3   • levothyroxine (SYNTHROID) 137 MCG Tab Take 1 Tab by mouth Every morning on an empty stomach for 90 days. 90 Tab 2   • ABALOPARATIDE 3120 MCG/1.56ML INJECT 80MCG SUBCUTANEOUSLY EVERY DAY     • Cholecalciferol (VITAMIN D3) 25 MCG Tab Take  by mouth.     • POLYETHYLENE GLYCOL 3350 PO Take 17 g by mouth 1 time daily as needed. Indications: Constipation     • ezetimibe (ZETIA) 10 MG Tab Take 10 mg by mouth every evening.  3   • omeprazole (PRILOSEC) 40 MG delayed-release capsule Take 1 Cap by mouth every day. 90 Cap 3   • cetirizine (ZYRTEC) 10 MG Tab Take 10 mg by mouth every morning.       No current facility-administered medications for this visit.      She  has a past medical history of Allergic rhinitis, Arthritis, ASTHMA, Atrial fibrillation (HCC) (January 2015), B12 deficiency, Cancer ([facility]), CHI (closed head injury), COPD (chronic obstructive pulmonary disease) ([facility]), Degenerative joint  "disease, Dental disorder, Depression, GERD (gastroesophageal reflux disease), Hemorrhoids, [place] (hard of hearing) ( ), HSV-1 infection, Hypertension, Hypothyroidism, MEDICAL HOME, Meningitis, Osteoporosis, Shingles, Sinusitis due to Moraxella catarrhalis, Stress incontinence, Thyroid cancer (HCC), and Vulvar cancer (HCC). She also has no past medical history of Breast cancer (HCC).    ROS   No fever, no vision changes       Objective:     /78 (BP Location: Right arm, Patient Position: Sitting, BP Cuff Size: Adult)   Pulse 93   Temp 36.7 °C (98.1 °F) (Temporal)   Ht 1.499 m (4' 11\")   Wt 53.5 kg (118 lb)   SpO2 95%  Body mass index is 23.83 kg/m².   Physical Exam:  Constitutional: Alert, no distress.  Skin: Warm, dry, good turgor. Left side of posterior and anterior neck mild erythemas macular papular rash in small clusters.  Eye: Equal, round and reactive, conjunctiva clear, lids normal.  Psych: Alert and oriented x3, normal affect and mood.        Assessment and Plan:   The following treatment plan was discussed    1. Herpes zoster without complication  New problem to me. Improving. Prescription for lidocaine 5% ointment to help with pain as needed.  - lidocaine (XYLOCAINE) 5 % Ointment; Apply 1 g to affected area(s) every four hours as needed (shingles pain on left side of neck/face).  Dispense: 50 g; Refill: 1      Followup: Return if symptoms worsen or fail to improve.         "

## 2020-09-11 ENCOUNTER — PATIENT MESSAGE (OUTPATIENT)
Dept: MEDICAL GROUP | Facility: MEDICAL CENTER | Age: 85
End: 2020-09-11

## 2020-09-11 DIAGNOSIS — B02.9 HERPES ZOSTER WITHOUT COMPLICATION: ICD-10-CM

## 2020-09-11 RX ORDER — VALACYCLOVIR HYDROCHLORIDE 1 G/1
1000 TABLET, FILM COATED ORAL 3 TIMES DAILY
Qty: 30 TAB | Refills: 0 | Status: SHIPPED | OUTPATIENT
Start: 2020-09-11 | End: 2020-09-21

## 2020-09-16 ENCOUNTER — APPOINTMENT (OUTPATIENT)
Dept: ENDOCRINOLOGY | Facility: MEDICAL CENTER | Age: 85
End: 2020-09-16
Attending: INTERNAL MEDICINE
Payer: MEDICARE

## 2020-10-05 ENCOUNTER — OFFICE VISIT (OUTPATIENT)
Dept: ENDOCRINOLOGY | Facility: MEDICAL CENTER | Age: 85
End: 2020-10-05
Attending: INTERNAL MEDICINE
Payer: MEDICARE

## 2020-10-05 VITALS
DIASTOLIC BLOOD PRESSURE: 70 MMHG | WEIGHT: 115 LBS | OXYGEN SATURATION: 94 % | BODY MASS INDEX: 23.18 KG/M2 | HEART RATE: 95 BPM | HEIGHT: 59 IN | SYSTOLIC BLOOD PRESSURE: 122 MMHG

## 2020-10-05 DIAGNOSIS — E55.9 VITAMIN D DEFICIENCY: ICD-10-CM

## 2020-10-05 DIAGNOSIS — E03.9 HYPOTHYROIDISM, UNSPECIFIED TYPE: ICD-10-CM

## 2020-10-05 DIAGNOSIS — M80.00XD AGE-RELATED OSTEOPOROSIS WITH CURRENT PATHOLOGICAL FRACTURE WITH ROUTINE HEALING, SUBSEQUENT ENCOUNTER: ICD-10-CM

## 2020-10-05 DIAGNOSIS — R80.3 BENCE JONES PROTEINURIA: ICD-10-CM

## 2020-10-05 DIAGNOSIS — S22.080D COMPRESSION FRACTURE OF T12 VERTEBRA WITH ROUTINE HEALING: ICD-10-CM

## 2020-10-05 PROCEDURE — 99214 OFFICE O/P EST MOD 30 MIN: CPT | Performed by: INTERNAL MEDICINE

## 2020-10-05 PROCEDURE — 99211 OFF/OP EST MAY X REQ PHY/QHP: CPT | Performed by: INTERNAL MEDICINE

## 2020-10-05 RX ORDER — CYANOCOBALAMIN 1000 UG/ML
1 INJECTION, SOLUTION INTRAMUSCULAR; SUBCUTANEOUS
COMMUNITY
Start: 2020-09-16 | End: 2020-10-07 | Stop reason: SDUPTHER

## 2020-10-05 RX ORDER — LEVOTHYROXINE SODIUM 137 UG/1
1 TABLET ORAL
COMMUNITY
Start: 2020-09-30 | End: 2020-10-07

## 2020-10-05 ASSESSMENT — FIBROSIS 4 INDEX: FIB4 SCORE: 0.85

## 2020-10-05 NOTE — PROGRESS NOTES
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 female here for follow up of the above medical issue.  In summary she has severe osteoporosis because of having a fragility fracture despite being on therapy with denosumab which was being given by another doctor.  Last bone density on November 21, 2019 showed the lowest T score of -2.7 for the left femur compatible with osteoporosis.  She was diagnosed with a T12 compression fracture on November 4, 2019 after a fall.   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  She is now being seen by Dr. Sinha and is being monitored for MGUS        She was on anabolic therapy with Tymlos 80 mcg subcu daily for her severe osteoporosis since Jan 2020. She is also taking calcium 600 mg daily and ergocalciferol 50,000 units weekly.    She stopped taking the medication in June because her co-pay went up.  We applied for patient assistance but she admits that she did not complete her patient assistance forms properly and did not send proof of her income      Since last visit patient reports feeling good.  She denies interval fracture.  She denies interval fall.  She denies interval nephrolithiasis.  She reports adherence to calcium supplementation recommendations.  She reports adherence to vitamin D supplementation.  Her activity level: no regular exercise, sedentary        She also forgot to complete her labs prior to this visit which includes her TSH, free T4 and vitamin D        She has a history of iatrogenic hyperthyroidism because she was being given too much thyroid hormone by another endocrinologist despite her history of atrial fibrillation and osteoporosis.  Originally she was taking levothyroxine 200+ Cytomel 25 mcg once a  day    On follow-up she is now taking levothyroxine 137 MCG daily which has been her medication for the past 3 months  She is overdue for labs she forgot to get her TSH done to this visit  She denies palpitations, tremors, insomnia and other hyperthyroid symptoms        Patient's medications, allergies, and social histories were reviewed and updated as appropriate.      ROS:       CONS:     No fever, no chills   EYES:     No diplopia, no blurry vision   CV:           No chest pain, no palpitations   PULM:     No SOB, no cough, no hemoptysis.   GI:            No nausea, no vomiting, no diarrhea, no constipation   ENDO:     No polyuria, no polydipsia, no heat intolerance, no cold intolerance     Past Medical History:  Problem List:  2020-08: Herpes zoster without complication  2020-01: Cough  2019-12: Bence Cleveland proteinuria  2019-12: Vitamin D deficiency  2019-11: Compression fracture of T12 vertebra with routine healing  2019-10: Breast pain, right  2019-02: Poor balance  2017-07: Mild intermittent asthma without complication  2016-09: Osteoporosis  2016-09: Seasonal allergies  2016-09: Prediabetes  2016-07: SEA on CPAP  2015-11: Weight loss  2015-11: Diarrhea  2015-11: Neck stiffness  2015-08: Memory loss, short term  2015-06: COPD exacerbation ([facility])  2015-05: Anemia  2015-05: Acute gastrointestinal hemorrhage  2015-05: Abdominal pain, other specified site  2015-05: Nausea & vomiting  2015-05: Atrial fibrillation ([facility])  2015-05: Hyponatremia  2015-05: SBO (small bowel obstruction) ([facility])  2015-05: Supratherapeutic INR  2015-02: Chronic anticoagulation  2015-02: MEDICAL HOME  2015-01: COPD with exacerbation ([facility])  2015-01: Pneumonia  2015-01: Atrial fibrillation with RVR ([facility])  2013-12: Other and unspecified ovarian cyst  2011-06: Vertigo, benign positional  2011-05: B12 deficiency  2011-05: Hard of hearing  2009-08: Essential hypertension, benign  2009-08: Hypothyroidism  2009-08: Sinusitis due to Moraxella  "catarrhalis  2009-08: Allergic rhinitis  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  2009-07: Preventative health care  COPD (chronic obstructive pulmonary disease) (HCC)  Mixed incontinence urge and stress  CHI (closed head injury)  MDD (major depressive disorder), recurrent episode, moderate (HCC)  DJD (degenerative joint disease)  Asthma, mild intermittent, well-controlled  GERD (gastroesophageal reflux disease)  History of vulvar dysplasia  Heart murmur      Past Surgical History:  Past Surgical History:   Procedure Laterality Date   • GASTROSCOPY WITH BIOPSY N/A 5/7/2015    Procedure: GASTROSCOPY WITH BIOPSY;  Surgeon: Travis Jones M.D.;  Location[facility];  Service:    • COLONOSCOPY WITH BIOPSY N/A 5/7/2015    Procedure: COLONOSCOPY WITH BIOPSY;  Surgeon: Travis Jones M.D.;  Location: [facility];  Service:    • HYSTERECTOMY ROBOTIC  12/27/2013    Performed by Zoltan Salazar M.D. at [facility]   • CYSTOSCOPY STENT PLACEMENT  12/27/2013    Performed by Zoltan Salazar M.D. at [facility]   • OTHER       Removal Rt sinus osteoma   • OTHER SURGICAL PROCEDURE      ORx3 cancer removal (vulvar Ca.)   • THYROIDECTOMY       Due to thyroid cancern   • TONSILLECTOMY          Allergies:  Ace inhibitors and Albuterol     Social History:  Social History     Tobacco Use   • Smoking status: Passive Smoke Exposure - Never Smoker   • Smokeless tobacco: Never Used   Substance Use Topics   • Alcohol use: Yes     Alcohol/week: 0.6 oz     Types: 1 Standard drinks or equivalent per week     Comment: rarely   • Drug use: No        Family History:   family history includes Cancer in her brother, father, sister, and sister; Heart Disease in her mother and sister; Other in her sister.      PHYSICAL EXAM:   Vital signs: /70   Pulse 95   Ht 1.499 m (4' 11\")   Wt 52.2 kg (115 lb)   LMP  (LMP Unknown)   SpO2 94%   BMI 23.23 kg/m²   GENERAL: Elderly female in no apparent distress.  "   EYE:  No ocular asymmetry, PERRLA  HENT: Pink, moist mucous membranes.    NECK: No thyromegaly.   CARDIOVASCULAR:  No murmurs  LUNGS: Clear breath sounds  BACK: No spinal tenderness on palpation  ABDOMEN: Soft, nontender   EXTREMITIES: No clubbing, cyanosis, or edema.   NEUROLOGICAL: No gross focal motor abnormalities   LYMPH: No cervical adenopathy palpated.   SKIN: No rashes, lesions.       Labs:    Lab Results   Component Value Date/Time    WBC 4.4 (L) 08/19/2020 01:04 PM    WBC 5.5 03/04/2011 11:11 AM    RBC 4.56 08/19/2020 01:04 PM    RBC 4.27 03/04/2011 11:11 AM    HEMOGLOBIN 13.5 08/19/2020 01:04 PM    MCV 90.6 08/19/2020 01:04 PM    MCV 91 03/04/2011 11:11 AM    MCH 29.6 08/19/2020 01:04 PM    MCH 30.9 03/04/2011 11:11 AM    MCHC 32.7 (L) 08/19/2020 01:04 PM    RDW 41.8 08/19/2020 01:04 PM    RDW 13.8 03/04/2011 11:11 AM    MPV 9.5 08/19/2020 01:04 PM       Lab Results   Component Value Date/Time    SODIUM 136 08/19/2020 01:04 PM    POTASSIUM 4.4 08/19/2020 01:04 PM    CHLORIDE 100 08/19/2020 01:04 PM    CO2 25 08/19/2020 01:04 PM    ANION 11.0 08/19/2020 01:04 PM    GLUCOSE 123 (H) 08/19/2020 01:04 PM    BUN 11 08/19/2020 01:04 PM    CREATININE 0.94 08/19/2020 01:04 PM    CREATININE 0.63 04/30/2013 12:03 PM    CALCIUM 8.9 08/19/2020 01:04 PM    ASTSGOT 11 (L) 08/19/2020 01:04 PM    ALTSGPT 13 08/19/2020 01:04 PM    TBILIRUBIN 0.3 08/19/2020 01:04 PM    ALBUMIN 4.0 08/19/2020 01:04 PM    ALBUMIN 3.83 08/19/2020 01:04 PM    TOTPROTEIN 7.1 08/19/2020 01:04 PM    TOTPROTEIN 6.6 08/19/2020 01:04 PM    GLOBULIN 3.1 08/19/2020 01:04 PM    AGRATIO 1.3 08/19/2020 01:04 PM       Lab Results   Component Value Date/Time    TSHULTRASEN <0.005 (L) 11/14/2019 1028     Lab Results   Component Value Date/Time    FREET4 2.08 (H) 11/14/2019 1028     Lab Results   Component Value Date/Time    FREET3 3.75 11/14/2019 1028     No results found for: THYSTIMIG        Imaging:        ASSESSMENT/PLAN:     1. Age-related  osteoporosis with current pathological fracture with routine healing, subsequent encounter  Unstable with elevated fracture risk due to severe osteoporosis  Restart Tymlos 80 mcg subcu daily   I am going to help the patient apply for patient assistance true radius assist I gave her the forms  She needs to bring back the forms to the office  Continue calcium and vitamin D supplementation  Recommend regular weightbearing exercise  I am getting labs today and I will update her  We will plan for follow-up in 3 months with a repeat of her calcium 25-hydroxy vitamin D and PTH      2. Compression fracture of T12 vertebra with routine healing  Stable  She is no longer in pain  Recommend observation  Recommend imaging of her back but I will defer this to her orthopedic surgeon    3. Bence [place] proteinuria  She has MGUS now being followed by Dr. Sinha with oncology  Continue observation and monitoring of her hematologic parameters    4. Hypothyroidism, unspecified type  She is overdue for labs  She was previously iatrogenically hyperthyroid when she was given high doses of Synthroid and Cytomel by another doctor  I am getting a TSH today and I will update her  I am going to adjust her levothyroxine  dose depending upon her labs  We will plan for follow-up in 3 months    5. Vitamin D deficiency  Controlled adequate vitamin D levels  Continue ergocalciferol 50,000 units weekly  We will plan for repeat calcium and vitamin D levels in 3 months      Return in about 3 months (around 1/5/2021).      Thank you kindly for allowing me to participate in the endocrine care plan for this patient.    Toñito Luevano MD, NEVIN, [facility]  12/04/19    CC:   Ezequiel Grewal M.D.

## 2020-10-06 ENCOUNTER — PATIENT MESSAGE (OUTPATIENT)
Dept: MEDICAL GROUP | Facility: MEDICAL CENTER | Age: 85
End: 2020-10-06

## 2020-10-06 ENCOUNTER — HOSPITAL ENCOUNTER (OUTPATIENT)
Dept: LAB | Facility: MEDICAL CENTER | Age: 85
End: 2020-10-06
Attending: INTERNAL MEDICINE
Payer: MEDICARE

## 2020-10-06 DIAGNOSIS — M80.00XD AGE-RELATED OSTEOPOROSIS WITH CURRENT PATHOLOGICAL FRACTURE WITH ROUTINE HEALING, SUBSEQUENT ENCOUNTER: ICD-10-CM

## 2020-10-06 DIAGNOSIS — E53.8 B12 DEFICIENCY: ICD-10-CM

## 2020-10-06 DIAGNOSIS — R80.3 BENCE JONES PROTEINURIA: ICD-10-CM

## 2020-10-06 DIAGNOSIS — E03.9 HYPOTHYROIDISM, UNSPECIFIED TYPE: ICD-10-CM

## 2020-10-06 DIAGNOSIS — S22.080D COMPRESSION FRACTURE OF T12 VERTEBRA WITH ROUTINE HEALING: ICD-10-CM

## 2020-10-06 DIAGNOSIS — E55.9 VITAMIN D DEFICIENCY: ICD-10-CM

## 2020-10-06 LAB
25(OH)D3 SERPL-MCNC: 43 NG/ML (ref 30–100)
ALBUMIN SERPL BCP-MCNC: 3.7 G/DL (ref 3.2–4.9)
ALBUMIN/GLOB SERPL: 1.3 G/DL
ALP SERPL-CCNC: 99 U/L (ref 30–99)
ALT SERPL-CCNC: 16 U/L (ref 2–50)
ANION GAP SERPL CALC-SCNC: 8 MMOL/L (ref 7–16)
AST SERPL-CCNC: 11 U/L (ref 12–45)
BILIRUB SERPL-MCNC: <0.2 MG/DL (ref 0.1–1.5)
BUN SERPL-MCNC: 15 MG/DL (ref 8–22)
CALCIUM SERPL-MCNC: 8.6 MG/DL (ref 8.5–10.5)
CHLORIDE SERPL-SCNC: 106 MMOL/L (ref 96–112)
CO2 SERPL-SCNC: 25 MMOL/L (ref 20–33)
CREAT SERPL-MCNC: 0.98 MG/DL (ref 0.5–1.4)
GLOBULIN SER CALC-MCNC: 2.9 G/DL (ref 1.9–3.5)
GLUCOSE SERPL-MCNC: 99 MG/DL (ref 65–99)
POTASSIUM SERPL-SCNC: 4.4 MMOL/L (ref 3.6–5.5)
PROT SERPL-MCNC: 6.6 G/DL (ref 6–8.2)
PTH-INTACT SERPL-MCNC: 33.8 PG/ML (ref 14–72)
SODIUM SERPL-SCNC: 139 MMOL/L (ref 135–145)
T4 FREE SERPL-MCNC: 1.81 NG/DL (ref 0.93–1.7)
TSH SERPL DL<=0.005 MIU/L-ACNC: <0.005 UIU/ML (ref 0.38–5.33)

## 2020-10-06 PROCEDURE — 84439 ASSAY OF FREE THYROXINE: CPT

## 2020-10-06 PROCEDURE — 36415 COLL VENOUS BLD VENIPUNCTURE: CPT

## 2020-10-06 PROCEDURE — 82306 VITAMIN D 25 HYDROXY: CPT

## 2020-10-06 PROCEDURE — 83970 ASSAY OF PARATHORMONE: CPT

## 2020-10-06 PROCEDURE — 84443 ASSAY THYROID STIM HORMONE: CPT

## 2020-10-06 PROCEDURE — 80053 COMPREHEN METABOLIC PANEL: CPT

## 2020-10-07 DIAGNOSIS — E03.9 HYPOTHYROIDISM, UNSPECIFIED TYPE: ICD-10-CM

## 2020-10-07 RX ORDER — OXYBUTYNIN CHLORIDE 5 MG/1
5 TABLET, EXTENDED RELEASE ORAL DAILY
Qty: 90 TAB | Refills: 3 | Status: SHIPPED | OUTPATIENT
Start: 2020-10-07 | End: 2021-01-01 | Stop reason: SDUPTHER

## 2020-10-07 RX ORDER — CYANOCOBALAMIN 1000 UG/ML
1000 INJECTION, SOLUTION INTRAMUSCULAR; SUBCUTANEOUS
Qty: 12 ML | Refills: 3 | Status: SHIPPED | OUTPATIENT
Start: 2020-10-07 | End: 2021-01-01

## 2020-10-07 RX ORDER — LEVOTHYROXINE SODIUM 0.12 MG/1
125 TABLET ORAL
Qty: 90 TAB | Refills: 2 | Status: SHIPPED | OUTPATIENT
Start: 2020-10-07 | End: 2021-01-01

## 2020-10-07 RX ORDER — CYANOCOBALAMIN 1000 UG/ML
1000 INJECTION, SOLUTION INTRAMUSCULAR; SUBCUTANEOUS
Qty: 12 ML | Refills: 3 | Status: SHIPPED | OUTPATIENT
Start: 2020-10-07 | End: 2020-10-07 | Stop reason: SDUPTHER

## 2020-10-07 NOTE — TELEPHONE ENCOUNTER
From: Tania Aguilar  To: Ezequiel Grewal M.D.  Sent: 10/6/2020 4:49 PM PDT  Subject: Prescription Question    Would you be able to prescribe my Vit-B12, 1000 mcg/ml, for once a week Injection shots? 90 day supply-12 injections. Prescription needs to be sent to [facility] at [id number] [facility] as [facility] does not carry it. Dr. Luevano requested that I ask you to fill this prescription.   Look forward to hearing back from you,  Rossana

## 2020-10-19 ENCOUNTER — HOSPITAL ENCOUNTER (OUTPATIENT)
Dept: LAB | Facility: MEDICAL CENTER | Age: 85
End: 2020-10-19
Attending: INTERNAL MEDICINE
Payer: MEDICARE

## 2020-10-19 LAB
ALBUMIN SERPL BCP-MCNC: 3.8 G/DL (ref 3.2–4.9)
ALBUMIN/GLOB SERPL: 1.3 G/DL
ALP SERPL-CCNC: 96 U/L (ref 30–99)
ALT SERPL-CCNC: 14 U/L (ref 2–50)
ANION GAP SERPL CALC-SCNC: 10 MMOL/L (ref 7–16)
AST SERPL-CCNC: 10 U/L (ref 12–45)
BASOPHILS # BLD AUTO: 1.1 % (ref 0–1.8)
BASOPHILS # BLD: 0.06 K/UL (ref 0–0.12)
BILIRUB SERPL-MCNC: 0.4 MG/DL (ref 0.1–1.5)
BUN SERPL-MCNC: 16 MG/DL (ref 8–22)
CALCIUM SERPL-MCNC: 9 MG/DL (ref 8.5–10.5)
CHLORIDE SERPL-SCNC: 103 MMOL/L (ref 96–112)
CO2 SERPL-SCNC: 25 MMOL/L (ref 20–33)
CREAT SERPL-MCNC: 0.79 MG/DL (ref 0.5–1.4)
EOSINOPHIL # BLD AUTO: 0.22 K/UL (ref 0–0.51)
EOSINOPHIL NFR BLD: 4.1 % (ref 0–6.9)
ERYTHROCYTE [DISTWIDTH] IN BLOOD BY AUTOMATED COUNT: 50.2 FL (ref 35.9–50)
GLOBULIN SER CALC-MCNC: 3 G/DL (ref 1.9–3.5)
GLUCOSE SERPL-MCNC: 100 MG/DL (ref 65–99)
HCT VFR BLD AUTO: 39.7 % (ref 37–47)
HGB BLD-MCNC: 13.1 G/DL (ref 12–16)
IMM GRANULOCYTES # BLD AUTO: 0.02 K/UL (ref 0–0.11)
IMM GRANULOCYTES NFR BLD AUTO: 0.4 % (ref 0–0.9)
LYMPHOCYTES # BLD AUTO: 2.33 K/UL (ref 1–4.8)
LYMPHOCYTES NFR BLD: 43.1 % (ref 22–41)
MCH RBC QN AUTO: 30.5 PG (ref 27–33)
MCHC RBC AUTO-ENTMCNC: 33 G/DL (ref 33.6–35)
MCV RBC AUTO: 92.5 FL (ref 81.4–97.8)
MONOCYTES # BLD AUTO: 0.35 K/UL (ref 0–0.85)
MONOCYTES NFR BLD AUTO: 6.5 % (ref 0–13.4)
NEUTROPHILS # BLD AUTO: 2.43 K/UL (ref 2–7.15)
NEUTROPHILS NFR BLD: 44.8 % (ref 44–72)
NRBC # BLD AUTO: 0 K/UL
NRBC BLD-RTO: 0 /100 WBC
PLATELET # BLD AUTO: 342 K/UL (ref 164–446)
PMV BLD AUTO: 10.4 FL (ref 9–12.9)
POTASSIUM SERPL-SCNC: 4.2 MMOL/L (ref 3.6–5.5)
PROT SERPL-MCNC: 6.8 G/DL (ref 6–8.2)
RBC # BLD AUTO: 4.29 M/UL (ref 4.2–5.4)
SODIUM SERPL-SCNC: 138 MMOL/L (ref 135–145)
WBC # BLD AUTO: 5.4 K/UL (ref 4.8–10.8)

## 2020-10-19 PROCEDURE — 84156 ASSAY OF PROTEIN URINE: CPT

## 2020-10-19 PROCEDURE — 85025 COMPLETE CBC W/AUTO DIFF WBC: CPT

## 2020-10-19 PROCEDURE — 82232 ASSAY OF BETA-2 PROTEIN: CPT

## 2020-10-19 PROCEDURE — 36415 COLL VENOUS BLD VENIPUNCTURE: CPT

## 2020-10-19 PROCEDURE — 86334 IMMUNOFIX E-PHORESIS SERUM: CPT

## 2020-10-19 PROCEDURE — 80053 COMPREHEN METABOLIC PANEL: CPT

## 2020-10-19 PROCEDURE — 82784 ASSAY IGA/IGD/IGG/IGM EACH: CPT

## 2020-10-19 PROCEDURE — 83520 IMMUNOASSAY QUANT NOS NONAB: CPT | Mod: 91

## 2020-10-19 PROCEDURE — 84165 PROTEIN E-PHORESIS SERUM: CPT

## 2020-10-19 PROCEDURE — 84155 ASSAY OF PROTEIN SERUM: CPT

## 2020-10-21 LAB — B2 MICROGLOB SERPL-MCNC: 3 MG/L (ref 1.1–2.4)

## 2020-10-22 LAB
COLLECT DURATION TIME SPEC: NORMAL HRS
KAPPA LC FREE 24H UR-MRATE: NORMAL MG/D
KAPPA LC FREE UR-MCNC: 14.46 MG/L (ref 0–32.9)
LAMBDA LC FREE 24H UR-MRATE: NORMAL MG/D
LAMBDA LC FREE UR-MCNC: <0.74 MG/L (ref 0–3.79)
PROT 24H UR-MRATE: NORMAL MG/D
SPECIMEN VOL ?TM UR: NORMAL ML

## 2020-10-23 LAB
ALBUMIN SERPL ELPH-MCNC: 3.84 G/DL (ref 3.75–5.01)
ALPHA1 GLOB SERPL ELPH-MCNC: 0.27 G/DL (ref 0.19–0.46)
ALPHA2 GLOB SERPL ELPH-MCNC: 0.78 G/DL (ref 0.48–1.05)
B-GLOBULIN SERPL ELPH-MCNC: 0.53 G/DL (ref 0.48–1.1)
EER MONOCLONAL PROTEIN AND FLC, SERUM Q5224: ABNORMAL
GAMMA GLOB SERPL ELPH-MCNC: 1.18 G/DL (ref 0.62–1.51)
IGA SERPL-MCNC: 18 MG/DL (ref 68–408)
IGG SERPL-MCNC: 1500 MG/DL (ref 768–1632)
IGM SERPL-MCNC: 17 MG/DL (ref 35–263)
INTERPRETATION SERPL IFE-IMP: ABNORMAL
INTERPRETATION SERPL IFE-IMP: ABNORMAL
KAPPA LC FREE SER-MCNC: 46.28 MG/L (ref 3.3–19.4)
KAPPA LC FREE/LAMBDA FREE SER NEPH: 5.96 {RATIO} (ref 0.26–1.65)
LAMBDA LC FREE SERPL-MCNC: 7.77 MG/L (ref 5.71–26.3)
PROT SERPL-MCNC: 6.6 G/DL (ref 6.3–8.2)

## 2020-10-30 LAB
ALBUMIN 24H MFR UR ELPH: 91.6 %
ALPHA1 GLOB 24H MFR UR ELPH: 6.5 %
ALPHA2 GLOB 24H MFR UR ELPH: 1.9 %
B-GLOBULIN 24H MFR UR ELPH: 0 %
EER MONOCLONAL PROTEIN STUDY, 24 HOUR U Q5964: NORMAL
GAMMA GLOB 24H MFR UR ELPH: 0 %
INTERPRETATION UR IFE-IMP: NORMAL
M PROTEIN 24H MFR UR ELPH: 0 %
M PROTEIN 24H UR ELPH-MRATE: NORMAL MG/24 HRS
PROT 24H UR-MRATE: NORMAL MG/D (ref 40–150)
PROT UR-MCNC: 4 MG/DL

## 2020-11-11 DIAGNOSIS — E55.9 VITAMIN D DEFICIENCY: ICD-10-CM

## 2020-11-12 RX ORDER — ERGOCALCIFEROL 1.25 MG/1
50000 CAPSULE ORAL
Qty: 12 CAP | Refills: 0 | Status: SHIPPED | OUTPATIENT
Start: 2020-11-12 | End: 2021-01-01 | Stop reason: SDUPTHER

## 2020-11-12 NOTE — TELEPHONE ENCOUNTER
Received request via: Pharmacy    Was the patient seen in the last year in this department? Yes    Does the patient have an active prescription (recently filled or refills available) for medication(s) requested? No       vitamin D, Ergocalciferol, (DRISDOL) 1.25 MG (30698 UT) Cap capsule        Sig: Take 1 Cap by mouth.

## 2020-11-23 PROBLEM — C90.00 MULTIPLE MYELOMA NOT HAVING ACHIEVED REMISSION (HCC): Status: ACTIVE | Noted: 2020-01-01

## 2020-11-23 NOTE — PROGRESS NOTES
Chief Complaint   Patient presents with   • Annual Wellness Visit         HPI:  Tania Aguilar is a 85 y.o. here for Medicare Annual Wellness Visit     Patient Active Problem List    Diagnosis Date Noted   • Atrial fibrillation (HCC) 05/01/2015     Priority: High   • Essential hypertension, benign 08/19/2009     Priority: High   • Hard of hearing 05/03/2011     Priority: Medium   • Asthma, mild intermittent, well-controlled      Priority: Medium   • GERD (gastroesophageal reflux disease)      Priority: Medium   • Heart murmur      Priority: Medium   • COPD (chronic obstructive pulmonary disease) ([facility])      Priority: Medium   • B12 deficiency 05/03/2011     Priority: Low   • Mixed incontinence urge and stress      Priority: Low   • MDD (major depressive disorder), recurrent episode, moderate ([facility])      Priority: Low   • DJD (degenerative joint disease)      Priority: Low   • History of vulvar dysplasia      Priority: Low   • Hypothyroidism 08/19/2009     Priority: Low   • Multiple myeloma not having achieved remission ([facility]) 11/23/2020   • Herpes zoster without complication 08/25/2020   • Cough 01/23/2020   • Bence Cleveland proteinuria 12/04/2019   • Vitamin D deficiency 12/04/2019   • Compression fracture of T12 vertebra with routine healing 11/06/2019   • Breast pain, right 10/04/2019   • Poor balance 02/01/2019   • Osteoporosis 09/14/2016   • Seasonal allergies 09/14/2016   • Prediabetes 09/14/2016   • SEA on CPAP 07/07/2016       Current Outpatient Medications   Medication Sig Dispense Refill   • vitamin D, Ergocalciferol, (DRISDOL) 1.25 MG (38585 UT) Cap capsule Take 1 Cap by mouth every 7 days. 12 Cap 0   • oxybutynin SR (DITROPAN-XL) 5 MG TABLET SR 24 HR Take 1 Tab by mouth every day. 90 Tab 3   • cyanocobalamin (VITAMIN B-12) 1000 MCG/ML Solution 1 mL by Intramuscular route every 7 days. 12 mL 3   • levothyroxine (SYNTHROID) 125 MCG Tab Take 1 Tab by mouth Every morning on an empty stomach for 90 days. 90  Tab 2   • ABALOPARATIDE 3120 MCG/1.56ML INJECT 80MCG SUBCUTANEOUSLY EVERY DAY     • Cholecalciferol (VITAMIN D3) 25 MCG Tab Take  by mouth.     • cetirizine (ZYRTEC) 10 MG Tab Take 10 mg by mouth every morning.       No current facility-administered medications for this visit.             Current supplements as per medication list.       Allergies: Ace inhibitors and Albuterol    Current social contact/activities: patient reports going to [employer] often, sees her kids and grand kids frequently     She  reports that she is a non-smoker but has been exposed to tobacco smoke. She has been exposed to 0.00 packs per day. She has never used smokeless tobacco. She reports current alcohol use of about 0.6 oz of alcohol per week. She reports that she does not use drugs.  Counseling given: Not Answered      DPA/Advanced Directive:  Patient has Advanced Directive on file.     ROS:    Gait: Uses a walker and cane  Ostomy: No  Other tubes: No  Amputations: No  Chronic oxygen use: No  Last eye exam: patient reports January 2020  Wears hearing aids: Yes   : Reports urinary leakage during the last 6 months that has not interfered at all with their daily activities or sleep.    Depression Screening    Little interest or pleasure in doing things?  0 - not at all  Feeling down, depressed , or hopeless? 0 - not at all  Patient Health Questionnaire Score: 0     If depressive symptoms identified deferred to follow up visit unless specifically addressed in assessment and plan.    Interpretation of PHQ-9 Total Score   Score Severity   1-4 No Depression   5-9 Mild Depression   10-14 Moderate Depression   15-19 Moderately Severe Depression   20-27 Severe Depression    Screening for Cognitive Impairment    Three Minute Recall (river, jimbo, finger) 3/3    Henrique clock face with all 12 numbers and set the hands to show 10 past 11.  Yes 4/5  Cognitive concerns identified deferred for follow up unless specifically addressed in assessment and  plan.    Fall Risk Assessment    Has the patient had two or more falls in the last year or any fall with injury in the last year?  No    Safety Assessment    Throw rugs on floor.  No  Handrails on all stairs.  Yes  Good lighting in all hallways.  Yes  Difficulty hearing.  Yes  Patient counseled about all safety risks that were identified.    Functional Assessment ADLs    Are there any barriers preventing you from cooking for yourself or meeting nutritional needs?  No.    Are there any barriers preventing you from driving safely or obtaining transportation?  No. Patient reports not driving, does not have issues obtaining transporation  Are there any barriers preventing you from using a telephone or calling for help?  No.    Are there any barriers preventing you from shopping?  No. Patient reports depending on other for shopping, still able to shop herself  Are there any barriers preventing you from taking care of your own finances?  No.    Are there any barriers preventing you from managing your medications?  No.    Are there any barriers preventing you from showering, bathing or dressing yourself?  No.    Are you currently engaging in any exercise or physical activity?  No.  Patient reports minimal to no exercise  What is your perception of your health?  Good.      Health Maintenance Summary                IMM DTaP/Tdap/Td Vaccine Overdue 2/9/1954     IMM ZOSTER VACCINES Overdue 2/9/1986     Annual Pulmonary Function Test / Spirometry Overdue 5/17/2017      Done 5/17/2016 AMB SIMPLE SPIROMETRY     Patient has more history with this topic...    COLONOSCOPY Overdue 5/28/2020      Done 5/28/2015 AMB REFERRAL TO GI FOR COLONOSCOPY     Patient has more history with this topic...    Annual Wellness Visit Next Due 11/24/2021      Done 11/23/2020 Visit Dx: Medicare annual wellness visit, subsequent     Patient has more history with this topic...    BONE DENSITY Next Due 11/21/2024      Done 11/21/2019 DS-BONE DENSITY STUDY  (DEXA)     Patient has more history with this topic...          Patient Care Team:  Ezequiel Grewal M.D. as PCP - General (Family Medicine)  Zoltan Salazar M.D. as Consulting Physician (Gynecologic Oncology)  Sylvain Romano M.D. as Consulting Physician (Ophthalmology)  Talib Onofre as [facility]   Toñito Luevano M.D. (Endocrinology)        Social History     Tobacco Use   • Smoking status: Passive Smoke Exposure - Never Smoker   • Smokeless tobacco: Never Used   Substance Use Topics   • Alcohol use: Yes     Alcohol/week: 0.6 oz     Types: 1 Standard drinks or equivalent per week     Comment: rarely   • Drug use: No     Family History   Problem Relation Age of Onset   • Heart Disease Mother         [email or web address]   • Cancer Father         throat Cancer   • Heart Disease Sister    • Cancer Brother         brain tumor   • Cancer Sister         breast   • Cancer Sister         lung   • Other Sister         myeloma     She  has a past medical history of Allergic rhinitis, Arthritis, ASTHMA, Atrial fibrillation (HCC) (January 2015), B12 deficiency, Cancer ([facility]), CHI (closed head injury), COPD (chronic obstructive pulmonary disease) ([facility]), Degenerative joint disease, Dental disorder, Depression, GERD (gastroesophageal reflux disease), Hemorrhoids, [place] (hard of hearing) ( ), HSV-1 infection, Hypertension, Hypothyroidism, MEDICAL HOME, Meningitis, Osteoporosis, Shingles, Sinusitis due to Moraxella catarrhalis, Stress incontinence, Thyroid cancer ([facility]), and Vulvar cancer ([facility]). She also has no past medical history of Breast cancer ([facility]).   Past Surgical History:   Procedure Laterality Date   • GASTROSCOPY WITH BIOPSY N/A 5/7/2015    Procedure: GASTROSCOPY WITH BIOPSY;  Surgeon: Travis Jones M.D.;  Location: [facility];  Service:    • COLONOSCOPY WITH BIOPSY N/A 5/7/2015    Procedure: COLONOSCOPY WITH BIOPSY;  Surgeon: Travis Jones M.D.;  Location: [facility];   "Service:    • HYSTERECTOMY ROBOTIC  12/27/2013    Performed by Zoltan Salazar M.D. at SURGERY [facility]   • CYSTOSCOPY STENT PLACEMENT  12/27/2013    Performed by Zoltan Salazar M.D. at SURGERY [facility]   • OTHER       Removal Rt sinus osteoma   • OTHER SURGICAL PROCEDURE      ORx3 cancer removal (vulvar Ca.)   • THYROIDECTOMY       Due to thyroid cancern   • TONSILLECTOMY         Exam:   /78 (BP Location: Right arm, Patient Position: Sitting, BP Cuff Size: Adult)   Pulse 80   Temp 36.9 °C (98.5 °F) (Temporal)   Ht 1.499 m (4' 11\")   Wt 53.6 kg (118 lb 3.2 oz)   SpO2 95%  Body mass index is 23.87 kg/m².    Constitutional: Alert, no distress.  Skin: Warm, dry, good turgor, no rashes in visible areas.  Eye: Equal, round and reactive, conjunctiva clear, lids normal.  Respiratory: Unlabored respiratory effort, lungs clear to auscultation, no wheezes, no ronchi.  Cardiovascular: Normal S1, S2, no murmur, no edema.  Psych: Alert and oriented x3, normal affect and mood.      Assessment and Plan. The following treatment and monitoring plan is recommended:    1. Medicare annual wellness visit, subsequent      2. MDD (major depressive disorder), recurrent episode, moderate (HCC)  Uncontrolled slightly right now, but patient believes she is off paroxetine 20 mg daily.  Her daughter will go home and check medication to see if she is in fact not taking paroxetine anymore.  If she is not then she would like to restart and we will restart 10 mg daily.    3. Paroxysmal atrial fibrillation (HCC)  Patient developed GI bleed with Coumadin.  She does not want to take aspirin for risk of bleeding as well.    4. Chronic obstructive pulmonary disease, unspecified COPD type (HCC)  Asymptomatic.  Continue to monitor.    5. Multiple myeloma not having achieved remission (HCC)  New diagnosis of smoldering multiple myeloma.  Continue following with labs every 3 months per oncology's recommendations.    6. Mixed incontinence " urge and stress  Stable.  Continue oxybutynin.    7. Hypothyroidism, unspecified type  Doing better clinically with lower dose of levothyroxine.  Continue levothyroxine.    8. History of vulvar dysplasia  No evidence of recurrence since 2005.        Services suggested: No services needed at this time  Health Care Screening: Age-appropriate preventive services recommended by USPTF and ACIP covered by Medicare were discussed today. Services ordered if indicated and agreed upon by the patient.  Referrals offered: Community-based lifestyle interventions to reduce health risks and promote self-management and wellness, fall prevention, nutrition, physical activity, tobacco-use cessation, weight loss, and mental health services as per orders if indicated.    Discussion today about general wellness and lifestyle habits:    · Prevent falls and reduce trip hazards; Cautioned about securing or removing rugs.  · Have a working fire alarm and carbon monoxide detector;   · Engage in regular physical activity and social activities     Follow-up: Return in about 1 year (around 11/23/2021) for Annual.

## 2021-01-01 ENCOUNTER — HOME CARE VISIT (OUTPATIENT)
Dept: HOSPICE | Facility: HOSPICE | Age: 86
End: 2021-01-01
Payer: MEDICARE

## 2021-01-01 ENCOUNTER — HOSPITAL ENCOUNTER (OUTPATIENT)
Dept: RADIOLOGY | Facility: MEDICAL CENTER | Age: 86
End: 2021-06-01
Attending: FAMILY MEDICINE
Payer: MEDICARE

## 2021-01-01 ENCOUNTER — TELEPHONE (OUTPATIENT)
Dept: MEDICAL GROUP | Facility: PHYSICIAN GROUP | Age: 86
End: 2021-01-01

## 2021-01-01 ENCOUNTER — HOME CARE VISIT (OUTPATIENT)
Dept: HOME HEALTH SERVICES | Facility: HOME HEALTHCARE | Age: 86
End: 2021-01-01
Payer: MEDICARE

## 2021-01-01 ENCOUNTER — APPOINTMENT (OUTPATIENT)
Dept: RADIOLOGY | Facility: MEDICAL CENTER | Age: 86
End: 2021-01-01
Attending: EMERGENCY MEDICINE
Payer: MEDICARE

## 2021-01-01 ENCOUNTER — PATIENT MESSAGE (OUTPATIENT)
Dept: MEDICAL GROUP | Facility: PHYSICIAN GROUP | Age: 86
End: 2021-01-01

## 2021-01-01 ENCOUNTER — NON-PROVIDER VISIT (OUTPATIENT)
Dept: MEDICAL GROUP | Facility: PHYSICIAN GROUP | Age: 86
End: 2021-01-01
Payer: MEDICARE

## 2021-01-01 ENCOUNTER — OFFICE VISIT (OUTPATIENT)
Dept: MEDICAL GROUP | Facility: PHYSICIAN GROUP | Age: 86
End: 2021-01-01
Payer: MEDICARE

## 2021-01-01 ENCOUNTER — HOSPITAL ENCOUNTER (OUTPATIENT)
Dept: LAB | Facility: MEDICAL CENTER | Age: 86
End: 2021-04-02
Attending: INTERNAL MEDICINE
Payer: MEDICARE

## 2021-01-01 ENCOUNTER — HOSPITAL ENCOUNTER (OUTPATIENT)
Dept: LAB | Facility: MEDICAL CENTER | Age: 86
End: 2021-10-01
Attending: INTERNAL MEDICINE
Payer: MEDICARE

## 2021-01-01 ENCOUNTER — HOSPITAL ENCOUNTER (OUTPATIENT)
Dept: LAB | Facility: MEDICAL CENTER | Age: 86
End: 2021-03-19
Attending: FAMILY MEDICINE
Payer: MEDICARE

## 2021-01-01 ENCOUNTER — PATIENT OUTREACH (OUTPATIENT)
Dept: HEALTH INFORMATION MANAGEMENT | Facility: OTHER | Age: 86
End: 2021-01-01

## 2021-01-01 ENCOUNTER — NON-PROVIDER VISIT (OUTPATIENT)
Dept: CARDIOLOGY | Facility: MEDICAL CENTER | Age: 86
End: 2021-01-01
Attending: FAMILY MEDICINE
Payer: MEDICARE

## 2021-01-01 ENCOUNTER — APPOINTMENT (OUTPATIENT)
Dept: MEDICAL GROUP | Facility: PHYSICIAN GROUP | Age: 86
End: 2021-01-01
Payer: MEDICARE

## 2021-01-01 ENCOUNTER — HOSPITAL ENCOUNTER (OUTPATIENT)
Dept: RADIOLOGY | Facility: MEDICAL CENTER | Age: 86
End: 2021-09-07
Attending: FAMILY MEDICINE
Payer: MEDICARE

## 2021-01-01 ENCOUNTER — HOSPITAL ENCOUNTER (EMERGENCY)
Facility: MEDICAL CENTER | Age: 86
End: 2021-07-25
Attending: EMERGENCY MEDICINE
Payer: MEDICARE

## 2021-01-01 ENCOUNTER — TELEPHONE (OUTPATIENT)
Dept: CARDIOLOGY | Facility: MEDICAL CENTER | Age: 86
End: 2021-01-01

## 2021-01-01 ENCOUNTER — PATIENT MESSAGE (OUTPATIENT)
Dept: HEALTH INFORMATION MANAGEMENT | Facility: OTHER | Age: 86
End: 2021-01-01

## 2021-01-01 ENCOUNTER — HOSPITAL ENCOUNTER (OUTPATIENT)
Dept: LAB | Facility: MEDICAL CENTER | Age: 86
End: 2021-01-01
Attending: INTERNAL MEDICINE
Payer: MEDICARE

## 2021-01-01 ENCOUNTER — APPOINTMENT (OUTPATIENT)
Dept: MEDICAL GROUP | Facility: MEDICAL CENTER | Age: 86
End: 2021-01-01
Payer: MEDICARE

## 2021-01-01 ENCOUNTER — HOSPICE ADMISSION (OUTPATIENT)
Dept: HOSPICE | Facility: HOSPICE | Age: 86
End: 2021-01-01
Payer: MEDICARE

## 2021-01-01 ENCOUNTER — HOME HEALTH ADMISSION (OUTPATIENT)
Dept: HOME HEALTH SERVICES | Facility: HOME HEALTHCARE | Age: 86
End: 2021-01-01
Payer: MEDICARE

## 2021-01-01 ENCOUNTER — OFFICE VISIT (OUTPATIENT)
Dept: SLEEP MEDICINE | Facility: MEDICAL CENTER | Age: 86
End: 2021-01-01
Payer: MEDICARE

## 2021-01-01 ENCOUNTER — HOSPITAL ENCOUNTER (OUTPATIENT)
Dept: LAB | Facility: MEDICAL CENTER | Age: 86
End: 2021-01-04
Attending: INTERNAL MEDICINE
Payer: MEDICARE

## 2021-01-01 ENCOUNTER — OFFICE VISIT (OUTPATIENT)
Dept: ENDOCRINOLOGY | Facility: MEDICAL CENTER | Age: 86
End: 2021-01-01
Attending: INTERNAL MEDICINE
Payer: MEDICARE

## 2021-01-01 ENCOUNTER — PATIENT MESSAGE (OUTPATIENT)
Dept: ENDOCRINOLOGY | Facility: MEDICAL CENTER | Age: 86
End: 2021-01-01

## 2021-01-01 ENCOUNTER — IMMUNIZATION (OUTPATIENT)
Dept: FAMILY PLANNING/WOMEN'S HEALTH CLINIC | Facility: IMMUNIZATION CENTER | Age: 86
End: 2021-01-01
Attending: INTERNAL MEDICINE
Payer: MEDICARE

## 2021-01-01 ENCOUNTER — DOCUMENTATION (OUTPATIENT)
Dept: MEDICAL GROUP | Facility: PHYSICIAN GROUP | Age: 86
End: 2021-01-01

## 2021-01-01 ENCOUNTER — HOSPITAL ENCOUNTER (OUTPATIENT)
Facility: MEDICAL CENTER | Age: 86
End: 2021-07-27
Attending: FAMILY MEDICINE
Payer: MEDICARE

## 2021-01-01 ENCOUNTER — HOSPITAL ENCOUNTER (OUTPATIENT)
Facility: MEDICAL CENTER | Age: 86
End: 2021-09-01
Attending: FAMILY MEDICINE
Payer: MEDICARE

## 2021-01-01 ENCOUNTER — IMMUNIZATION (OUTPATIENT)
Dept: FAMILY PLANNING/WOMEN'S HEALTH CLINIC | Facility: IMMUNIZATION CENTER | Age: 86
End: 2021-01-01
Payer: MEDICARE

## 2021-01-01 ENCOUNTER — HOSPITAL ENCOUNTER (OUTPATIENT)
Dept: LAB | Facility: MEDICAL CENTER | Age: 86
End: 2021-07-21
Attending: FAMILY MEDICINE
Payer: MEDICARE

## 2021-01-01 ENCOUNTER — TELEPHONE (OUTPATIENT)
Dept: SCHEDULING | Facility: IMAGING CENTER | Age: 86
End: 2021-01-01

## 2021-01-01 ENCOUNTER — APPOINTMENT (OUTPATIENT)
Dept: CARDIOLOGY | Facility: MEDICAL CENTER | Age: 86
End: 2021-01-01
Attending: INTERNAL MEDICINE
Payer: MEDICARE

## 2021-01-01 VITALS
DIASTOLIC BLOOD PRESSURE: 74 MMHG | OXYGEN SATURATION: 97 % | BODY MASS INDEX: 24.92 KG/M2 | WEIGHT: 123.6 LBS | HEART RATE: 77 BPM | TEMPERATURE: 98.2 F | HEIGHT: 59 IN | SYSTOLIC BLOOD PRESSURE: 140 MMHG | RESPIRATION RATE: 16 BRPM

## 2021-01-01 VITALS
DIASTOLIC BLOOD PRESSURE: 80 MMHG | HEART RATE: 66 BPM | SYSTOLIC BLOOD PRESSURE: 140 MMHG | OXYGEN SATURATION: 94 % | RESPIRATION RATE: 16 BRPM | TEMPERATURE: 98.6 F

## 2021-01-01 VITALS
BODY MASS INDEX: 25.6 KG/M2 | SYSTOLIC BLOOD PRESSURE: 150 MMHG | OXYGEN SATURATION: 97 % | TEMPERATURE: 98.3 F | RESPIRATION RATE: 16 BRPM | HEART RATE: 77 BPM | DIASTOLIC BLOOD PRESSURE: 70 MMHG | HEIGHT: 59 IN | WEIGHT: 127 LBS

## 2021-01-01 VITALS
WEIGHT: 128.2 LBS | BODY MASS INDEX: 25.84 KG/M2 | HEIGHT: 59 IN | OXYGEN SATURATION: 100 % | SYSTOLIC BLOOD PRESSURE: 142 MMHG | HEART RATE: 82 BPM | RESPIRATION RATE: 16 BRPM | DIASTOLIC BLOOD PRESSURE: 76 MMHG

## 2021-01-01 VITALS — RESPIRATION RATE: 20 BRPM | SYSTOLIC BLOOD PRESSURE: 110 MMHG | HEART RATE: 92 BPM | DIASTOLIC BLOOD PRESSURE: 72 MMHG

## 2021-01-01 VITALS
SYSTOLIC BLOOD PRESSURE: 195 MMHG | RESPIRATION RATE: 16 BRPM | OXYGEN SATURATION: 93 % | TEMPERATURE: 98 F | HEART RATE: 69 BPM | WEIGHT: 123.46 LBS | BODY MASS INDEX: 24.89 KG/M2 | DIASTOLIC BLOOD PRESSURE: 93 MMHG | HEIGHT: 59 IN

## 2021-01-01 VITALS
RESPIRATION RATE: 16 BRPM | HEART RATE: 79 BPM | DIASTOLIC BLOOD PRESSURE: 60 MMHG | OXYGEN SATURATION: 95 % | TEMPERATURE: 98.2 F | SYSTOLIC BLOOD PRESSURE: 121 MMHG

## 2021-01-01 VITALS
HEART RATE: 68 BPM | OXYGEN SATURATION: 98 % | TEMPERATURE: 97.9 F | RESPIRATION RATE: 16 BRPM | DIASTOLIC BLOOD PRESSURE: 90 MMHG | SYSTOLIC BLOOD PRESSURE: 160 MMHG

## 2021-01-01 VITALS
TEMPERATURE: 97.8 F | OXYGEN SATURATION: 95 % | HEART RATE: 63 BPM | DIASTOLIC BLOOD PRESSURE: 74 MMHG | SYSTOLIC BLOOD PRESSURE: 154 MMHG | RESPIRATION RATE: 16 BRPM

## 2021-01-01 VITALS
BODY MASS INDEX: 23.59 KG/M2 | SYSTOLIC BLOOD PRESSURE: 132 MMHG | WEIGHT: 117 LBS | OXYGEN SATURATION: 98 % | DIASTOLIC BLOOD PRESSURE: 76 MMHG | HEIGHT: 59 IN | HEART RATE: 73 BPM

## 2021-01-01 VITALS
TEMPERATURE: 97.7 F | SYSTOLIC BLOOD PRESSURE: 148 MMHG | HEART RATE: 92 BPM | DIASTOLIC BLOOD PRESSURE: 82 MMHG | RESPIRATION RATE: 20 BRPM

## 2021-01-01 VITALS
RESPIRATION RATE: 14 BRPM | BODY MASS INDEX: 25.26 KG/M2 | TEMPERATURE: 98.6 F | HEART RATE: 87 BPM | SYSTOLIC BLOOD PRESSURE: 130 MMHG | WEIGHT: 125.3 LBS | HEIGHT: 59 IN | DIASTOLIC BLOOD PRESSURE: 60 MMHG | OXYGEN SATURATION: 94 %

## 2021-01-01 VITALS
SYSTOLIC BLOOD PRESSURE: 116 MMHG | OXYGEN SATURATION: 95 % | HEART RATE: 80 BPM | HEIGHT: 59 IN | BODY MASS INDEX: 24.44 KG/M2 | DIASTOLIC BLOOD PRESSURE: 64 MMHG | WEIGHT: 121.2 LBS

## 2021-01-01 VITALS
BODY MASS INDEX: 24.64 KG/M2 | BODY MASS INDEX: 24.8 KG/M2 | TEMPERATURE: 98.8 F | OXYGEN SATURATION: 95 % | HEIGHT: 59 IN | SYSTOLIC BLOOD PRESSURE: 118 MMHG | RESPIRATION RATE: 18 BRPM | WEIGHT: 123 LBS | DIASTOLIC BLOOD PRESSURE: 70 MMHG | HEART RATE: 66 BPM | SYSTOLIC BLOOD PRESSURE: 170 MMHG | OXYGEN SATURATION: 95 % | HEIGHT: 59 IN | RESPIRATION RATE: 16 BRPM | WEIGHT: 122.2 LBS | DIASTOLIC BLOOD PRESSURE: 74 MMHG | HEART RATE: 76 BPM

## 2021-01-01 VITALS
HEIGHT: 59 IN | SYSTOLIC BLOOD PRESSURE: 171 MMHG | HEART RATE: 66 BPM | BODY MASS INDEX: 24.8 KG/M2 | DIASTOLIC BLOOD PRESSURE: 78 MMHG | TEMPERATURE: 97.5 F | WEIGHT: 123 LBS | RESPIRATION RATE: 16 BRPM | OXYGEN SATURATION: 98 %

## 2021-01-01 VITALS
TEMPERATURE: 98.1 F | HEART RATE: 74 BPM | DIASTOLIC BLOOD PRESSURE: 75 MMHG | OXYGEN SATURATION: 98 % | RESPIRATION RATE: 16 BRPM | SYSTOLIC BLOOD PRESSURE: 155 MMHG

## 2021-01-01 VITALS
HEIGHT: 59 IN | BODY MASS INDEX: 24.78 KG/M2 | SYSTOLIC BLOOD PRESSURE: 125 MMHG | TEMPERATURE: 98.9 F | HEART RATE: 70 BPM | DIASTOLIC BLOOD PRESSURE: 70 MMHG | WEIGHT: 122.9 LBS | OXYGEN SATURATION: 95 % | RESPIRATION RATE: 14 BRPM

## 2021-01-01 VITALS
HEART RATE: 75 BPM | OXYGEN SATURATION: 96 % | DIASTOLIC BLOOD PRESSURE: 72 MMHG | RESPIRATION RATE: 18 BRPM | HEIGHT: 59 IN | SYSTOLIC BLOOD PRESSURE: 120 MMHG | BODY MASS INDEX: 24.96 KG/M2 | WEIGHT: 123.8 LBS | TEMPERATURE: 97.7 F

## 2021-01-01 VITALS
DIASTOLIC BLOOD PRESSURE: 70 MMHG | HEART RATE: 76 BPM | OXYGEN SATURATION: 96 % | RESPIRATION RATE: 16 BRPM | TEMPERATURE: 98 F | SYSTOLIC BLOOD PRESSURE: 124 MMHG

## 2021-01-01 VITALS
RESPIRATION RATE: 16 BRPM | DIASTOLIC BLOOD PRESSURE: 70 MMHG | OXYGEN SATURATION: 99 % | TEMPERATURE: 98.1 F | SYSTOLIC BLOOD PRESSURE: 130 MMHG | HEART RATE: 64 BPM

## 2021-01-01 VITALS — RESPIRATION RATE: 24 BRPM

## 2021-01-01 VITALS — DIASTOLIC BLOOD PRESSURE: 58 MMHG | SYSTOLIC BLOOD PRESSURE: 130 MMHG

## 2021-01-01 VITALS — HEART RATE: 104 BPM | RESPIRATION RATE: 24 BRPM

## 2021-01-01 DIAGNOSIS — F41.9 ANXIETY: ICD-10-CM

## 2021-01-01 DIAGNOSIS — E55.9 VITAMIN D DEFICIENCY: ICD-10-CM

## 2021-01-01 DIAGNOSIS — S22.080D COMPRESSION FRACTURE OF T12 VERTEBRA WITH ROUTINE HEALING: ICD-10-CM

## 2021-01-01 DIAGNOSIS — J44.9 CHRONIC OBSTRUCTIVE PULMONARY DISEASE, UNSPECIFIED COPD TYPE (HCC): ICD-10-CM

## 2021-01-01 DIAGNOSIS — R51.9 CHRONIC NONINTRACTABLE HEADACHE, UNSPECIFIED HEADACHE TYPE: ICD-10-CM

## 2021-01-01 DIAGNOSIS — F01.50 VASCULAR DEMENTIA WITHOUT BEHAVIORAL DISTURBANCE (HCC): ICD-10-CM

## 2021-01-01 DIAGNOSIS — N39.46 MIXED INCONTINENCE URGE AND STRESS: ICD-10-CM

## 2021-01-01 DIAGNOSIS — R80.3 BENCE JONES PROTEINURIA: ICD-10-CM

## 2021-01-01 DIAGNOSIS — M80.00XD AGE-RELATED OSTEOPOROSIS WITH CURRENT PATHOLOGICAL FRACTURE WITH ROUTINE HEALING, SUBSEQUENT ENCOUNTER: ICD-10-CM

## 2021-01-01 DIAGNOSIS — R41.3 MEMORY PROBLEM: ICD-10-CM

## 2021-01-01 DIAGNOSIS — F33.1 MDD (MAJOR DEPRESSIVE DISORDER), RECURRENT EPISODE, MODERATE (HCC): ICD-10-CM

## 2021-01-01 DIAGNOSIS — Z23 ENCOUNTER FOR VACCINATION: Primary | ICD-10-CM

## 2021-01-01 DIAGNOSIS — G89.29 CHRONIC NONINTRACTABLE HEADACHE, UNSPECIFIED HEADACHE TYPE: ICD-10-CM

## 2021-01-01 DIAGNOSIS — M54.50 ACUTE LEFT-SIDED LOW BACK PAIN WITHOUT SCIATICA: ICD-10-CM

## 2021-01-01 DIAGNOSIS — R62.7 FAILURE TO THRIVE IN ADULT: ICD-10-CM

## 2021-01-01 DIAGNOSIS — Z23 NEED FOR VACCINATION: ICD-10-CM

## 2021-01-01 DIAGNOSIS — F01.50 VASCULAR DEMENTIA WITHOUT BEHAVIORAL DISTURBANCE (HCC): Primary | ICD-10-CM

## 2021-01-01 DIAGNOSIS — C90.00 MULTIPLE MYELOMA NOT HAVING ACHIEVED REMISSION (HCC): ICD-10-CM

## 2021-01-01 DIAGNOSIS — E03.9 ACQUIRED HYPOTHYROIDISM: ICD-10-CM

## 2021-01-01 DIAGNOSIS — Z23 NEED FOR SHINGLES VACCINE: ICD-10-CM

## 2021-01-01 DIAGNOSIS — Z87.412 HISTORY OF VULVAR DYSPLASIA: ICD-10-CM

## 2021-01-01 DIAGNOSIS — K21.9 GASTROESOPHAGEAL REFLUX DISEASE WITHOUT ESOPHAGITIS: ICD-10-CM

## 2021-01-01 DIAGNOSIS — S70.02XA CONTUSION OF LEFT HIP, INITIAL ENCOUNTER: ICD-10-CM

## 2021-01-01 DIAGNOSIS — S09.90XA CLOSED HEAD INJURY, INITIAL ENCOUNTER: ICD-10-CM

## 2021-01-01 DIAGNOSIS — E53.8 B12 DEFICIENCY: ICD-10-CM

## 2021-01-01 DIAGNOSIS — G47.33 OSA (OBSTRUCTIVE SLEEP APNEA): ICD-10-CM

## 2021-01-01 DIAGNOSIS — E03.9 HYPOTHYROIDISM, UNSPECIFIED TYPE: ICD-10-CM

## 2021-01-01 DIAGNOSIS — R25.9 ABNORMAL MOVEMENT: ICD-10-CM

## 2021-01-01 DIAGNOSIS — J45.20 ASTHMA, MILD INTERMITTENT, WELL-CONTROLLED: ICD-10-CM

## 2021-01-01 DIAGNOSIS — I47.10 SVT (SUPRAVENTRICULAR TACHYCARDIA) (HCC): ICD-10-CM

## 2021-01-01 DIAGNOSIS — Z66 DNR (DO NOT RESUSCITATE): ICD-10-CM

## 2021-01-01 DIAGNOSIS — I48.0 PAROXYSMAL ATRIAL FIBRILLATION (HCC): ICD-10-CM

## 2021-01-01 DIAGNOSIS — R13.10 DYSPHAGIA, UNSPECIFIED TYPE: ICD-10-CM

## 2021-01-01 DIAGNOSIS — R26.81 GAIT INSTABILITY: ICD-10-CM

## 2021-01-01 DIAGNOSIS — R09.82 PND (POST-NASAL DRIP): ICD-10-CM

## 2021-01-01 DIAGNOSIS — R52 PAIN: ICD-10-CM

## 2021-01-01 DIAGNOSIS — R05.9 COUGH: ICD-10-CM

## 2021-01-01 DIAGNOSIS — I10 ESSENTIAL HYPERTENSION, BENIGN: ICD-10-CM

## 2021-01-01 DIAGNOSIS — R25.9 INVOLUNTARY MOVEMENTS: ICD-10-CM

## 2021-01-01 DIAGNOSIS — G47.33 OSA ON CPAP: ICD-10-CM
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
## 2021-01-01 DIAGNOSIS — I48.0 PAF (PAROXYSMAL ATRIAL FIBRILLATION) (HCC): ICD-10-CM

## 2021-01-01 DIAGNOSIS — Z23 NEED FOR DIPHTHERIA-TETANUS-PERTUSSIS (TDAP) VACCINE: ICD-10-CM

## 2021-01-01 DIAGNOSIS — G89.29 OTHER CHRONIC PAIN: ICD-10-CM

## 2021-01-01 DIAGNOSIS — H91.13 PRESBYCUSIS OF BOTH EARS: ICD-10-CM

## 2021-01-01 DIAGNOSIS — I48.91 ATRIAL FIBRILLATION, UNSPECIFIED TYPE (HCC): ICD-10-CM

## 2021-01-01 DIAGNOSIS — R26.89 POOR BALANCE: ICD-10-CM

## 2021-01-01 DIAGNOSIS — R52 PAIN ASSOCIATED WITH TERMINAL ILLNESS: ICD-10-CM

## 2021-01-01 DIAGNOSIS — R06.2 WHEEZE: ICD-10-CM

## 2021-01-01 LAB
25(OH)D3 SERPL-MCNC: 66 NG/ML (ref 30–100)
25(OH)D3 SERPL-MCNC: 84 NG/ML (ref 30–100)
ALBUMIN 24H MFR UR ELPH: 51.9 %
ALBUMIN SERPL BCP-MCNC: 3.9 G/DL (ref 3.2–4.9)
ALBUMIN SERPL BCP-MCNC: 4.1 G/DL (ref 3.2–4.9)
ALBUMIN SERPL BCP-MCNC: 4.1 G/DL (ref 3.2–4.9)
ALBUMIN SERPL BCP-MCNC: 4.2 G/DL (ref 3.2–4.9)
ALBUMIN SERPL BCP-MCNC: 4.4 G/DL (ref 3.2–4.9)
ALBUMIN SERPL ELPH-MCNC: 4.06 G/DL (ref 3.75–5.01)
ALBUMIN/GLOB SERPL: 1.2 G/DL
ALBUMIN/GLOB SERPL: 1.2 G/DL
ALBUMIN/GLOB SERPL: 1.3 G/DL
ALBUMIN/GLOB SERPL: 1.4 G/DL
ALBUMIN/GLOB SERPL: 1.5 G/DL
ALP SERPL-CCNC: 60 U/L (ref 30–99)
ALP SERPL-CCNC: 79 U/L (ref 30–99)
ALP SERPL-CCNC: 83 U/L (ref 30–99)
ALP SERPL-CCNC: 87 U/L (ref 30–99)
ALP SERPL-CCNC: 88 U/L (ref 30–99)
ALPHA1 GLOB 24H MFR UR ELPH: 13.7 %
ALPHA1 GLOB SERPL ELPH-MCNC: 0.28 G/DL (ref 0.19–0.46)
ALPHA2 GLOB 24H MFR UR ELPH: 14.3 %
ALPHA2 GLOB SERPL ELPH-MCNC: 0.74 G/DL (ref 0.48–1.05)
ALT SERPL-CCNC: 11 U/L (ref 2–50)
ALT SERPL-CCNC: 12 U/L (ref 2–50)
ALT SERPL-CCNC: 13 U/L (ref 2–50)
ALT SERPL-CCNC: 15 U/L (ref 2–50)
ALT SERPL-CCNC: 9 U/L (ref 2–50)
AMPHETAMINES UR QL: NEGATIVE NG/ML
ANION GAP SERPL CALC-SCNC: 10 MMOL/L (ref 7–16)
ANION GAP SERPL CALC-SCNC: 12 MMOL/L (ref 7–16)
ANION GAP SERPL CALC-SCNC: 6 MMOL/L (ref 7–16)
ANION GAP SERPL CALC-SCNC: 7 MMOL/L (ref 7–16)
ANION GAP SERPL CALC-SCNC: 7 MMOL/L (ref 7–16)
ANION GAP SERPL CALC-SCNC: 8 MMOL/L (ref 7–16)
AST SERPL-CCNC: 13 U/L (ref 12–45)
AST SERPL-CCNC: 15 U/L (ref 12–45)
AST SERPL-CCNC: 15 U/L (ref 12–45)
AST SERPL-CCNC: 16 U/L (ref 12–45)
AST SERPL-CCNC: 16 U/L (ref 12–45)
B-GLOBULIN 24H MFR UR ELPH: 9.1 %
B-GLOBULIN SERPL ELPH-MCNC: 0.55 G/DL (ref 0.48–1.1)
B2 MICROGLOB SERPL-MCNC: 3 MG/L (ref 1.1–2.4)
BARBITURATES UR QL: NEGATIVE NG/ML
BASOPHILS # BLD AUTO: 0.8 % (ref 0–1.8)
BASOPHILS # BLD: 0.05 K/UL (ref 0–0.12)
BENZODIAZ UR QL: NEGATIVE NG/ML
BILIRUB SERPL-MCNC: 0.4 MG/DL (ref 0.1–1.5)
BILIRUB SERPL-MCNC: 0.4 MG/DL (ref 0.1–1.5)
BILIRUB SERPL-MCNC: 0.5 MG/DL (ref 0.1–1.5)
BUN SERPL-MCNC: 10 MG/DL (ref 8–22)
BUN SERPL-MCNC: 11 MG/DL (ref 8–22)
BUN SERPL-MCNC: 13 MG/DL (ref 8–22)
BUN SERPL-MCNC: 17 MG/DL (ref 8–22)
CALCIUM SERPL-MCNC: 8.9 MG/DL (ref 8.4–10.2)
CALCIUM SERPL-MCNC: 8.9 MG/DL (ref 8.5–10.5)
CALCIUM SERPL-MCNC: 9 MG/DL (ref 8.5–10.5)
CALCIUM SERPL-MCNC: 9.1 MG/DL (ref 8.5–10.5)
CALCIUM SERPL-MCNC: 9.2 MG/DL (ref 8.5–10.5)
CALCIUM SERPL-MCNC: 9.3 MG/DL (ref 8.5–10.5)
CANNABINOIDS UR QL SCN: NEGATIVE NG/ML
CHLORIDE SERPL-SCNC: 100 MMOL/L (ref 96–112)
CHLORIDE SERPL-SCNC: 106 MMOL/L (ref 96–112)
CHLORIDE SERPL-SCNC: 98 MMOL/L (ref 96–112)
CHLORIDE SERPL-SCNC: 98 MMOL/L (ref 96–112)
CHLORIDE SERPL-SCNC: 99 MMOL/L (ref 96–112)
CHLORIDE SERPL-SCNC: 99 MMOL/L (ref 96–112)
CO2 SERPL-SCNC: 24 MMOL/L (ref 20–33)
CO2 SERPL-SCNC: 26 MMOL/L (ref 20–33)
CO2 SERPL-SCNC: 27 MMOL/L (ref 20–33)
CO2 SERPL-SCNC: 27 MMOL/L (ref 20–33)
COCAINE UR QL: NEGATIVE NG/ML
COLLECT DURATION TIME SPEC: NORMAL HRS
CREAT SERPL-MCNC: 0.71 MG/DL (ref 0.5–1.4)
CREAT SERPL-MCNC: 0.81 MG/DL (ref 0.5–1.4)
CREAT SERPL-MCNC: 0.81 MG/DL (ref 0.5–1.4)
CREAT SERPL-MCNC: 0.86 MG/DL (ref 0.5–1.4)
CREAT SERPL-MCNC: 0.89 MG/DL (ref 0.5–1.4)
CREAT SERPL-MCNC: 0.91 MG/DL (ref 0.5–1.4)
DRUG SCREEN COMMENT UR-IMP: NORMAL
EER MONOCLONAL PROTEIN AND FLC, SERUM Q5224: ABNORMAL
EER MONOCLONAL PROTEIN STUDY, 24 HOUR U Q5964: NORMAL
EKG IMPRESSION: NORMAL
EKG IMPRESSION: NORMAL
EOSINOPHIL # BLD AUTO: 0.09 K/UL (ref 0–0.51)
EOSINOPHIL NFR BLD: 1.4 % (ref 0–6.9)
ERYTHROCYTE [DISTWIDTH] IN BLOOD BY AUTOMATED COUNT: 44.7 FL (ref 35.9–50)
FOLATE SERPL-MCNC: 17.9 NG/ML
GAMMA GLOB 24H MFR UR ELPH: 11 %
GAMMA GLOB SERPL ELPH-MCNC: 1.26 G/DL (ref 0.62–1.51)
GLOBULIN SER CALC-MCNC: 3 G/DL (ref 1.9–3.5)
GLOBULIN SER CALC-MCNC: 3 G/DL (ref 1.9–3.5)
GLOBULIN SER CALC-MCNC: 3.1 G/DL (ref 1.9–3.5)
GLOBULIN SER CALC-MCNC: 3.2 G/DL (ref 1.9–3.5)
GLOBULIN SER CALC-MCNC: 3.3 G/DL (ref 1.9–3.5)
GLUCOSE SERPL-MCNC: 103 MG/DL (ref 65–99)
GLUCOSE SERPL-MCNC: 104 MG/DL (ref 65–99)
GLUCOSE SERPL-MCNC: 106 MG/DL (ref 65–99)
GLUCOSE SERPL-MCNC: 127 MG/DL (ref 65–99)
GLUCOSE SERPL-MCNC: 91 MG/DL (ref 65–99)
GLUCOSE SERPL-MCNC: 91 MG/DL (ref 65–99)
HCT VFR BLD AUTO: 41.5 % (ref 37–47)
HGB BLD-MCNC: 14 G/DL (ref 12–16)
IGA SERPL-MCNC: 17 MG/DL (ref 68–408)
IGG SERPL-MCNC: 1482 MG/DL (ref 768–1632)
IGM SERPL-MCNC: 22 MG/DL (ref 35–263)
IMM GRANULOCYTES # BLD AUTO: 0.03 K/UL (ref 0–0.11)
IMM GRANULOCYTES NFR BLD AUTO: 0.5 % (ref 0–0.9)
INTERPRETATION SERPL IFE-IMP: ABNORMAL
INTERPRETATION SERPL IFE-IMP: ABNORMAL
INTERPRETATION UR IFE-IMP: NORMAL
KAPPA LC FREE SER-MCNC: 47.6 MG/L (ref 3.3–19.4)
KAPPA LC FREE/LAMBDA FREE SER NEPH: 10.92 {RATIO} (ref 0.26–1.65)
LAMBDA LC FREE SERPL-MCNC: 4.36 MG/L (ref 5.71–26.3)
LYMPHOCYTES # BLD AUTO: 1.61 K/UL (ref 1–4.8)
LYMPHOCYTES NFR BLD: 24.7 % (ref 22–41)
M PROTEIN 24H MFR UR ELPH: 0 %
M PROTEIN 24H UR ELPH-MRATE: NORMAL MG/24 HRS
MCH RBC QN AUTO: 30.8 PG (ref 27–33)
MCHC RBC AUTO-ENTMCNC: 33.7 G/DL (ref 33.6–35)
MCV RBC AUTO: 91.4 FL (ref 81.4–97.8)
MDMA CTO UR SCN-MCNC: NEGATIVE NG/ML
METHADONE UR QL: NEGATIVE NG/ML
MONOCYTES # BLD AUTO: 0.35 K/UL (ref 0–0.85)
MONOCYTES NFR BLD AUTO: 5.4 % (ref 0–13.4)
NEUTROPHILS # BLD AUTO: 4.39 K/UL (ref 2–7.15)
NEUTROPHILS NFR BLD: 67.2 % (ref 44–72)
NRBC # BLD AUTO: 0 K/UL
NRBC BLD-RTO: 0 /100 WBC
OPIATES UR QL: NEGATIVE NG/ML
OXYCODONE CTO UR SCN-MCNC: NEGATIVE NG/ML
PCP UR QL SCN: NEGATIVE NG/ML
PHOSPHATE SERPL-MCNC: 4 MG/DL (ref 2.5–4.5)
PLATELET # BLD AUTO: 306 K/UL (ref 164–446)
PMV BLD AUTO: 9.5 FL (ref 9–12.9)
POTASSIUM SERPL-SCNC: 4.2 MMOL/L (ref 3.6–5.5)
POTASSIUM SERPL-SCNC: 4.5 MMOL/L (ref 3.6–5.5)
POTASSIUM SERPL-SCNC: 4.8 MMOL/L (ref 3.6–5.5)
POTASSIUM SERPL-SCNC: 5.4 MMOL/L (ref 3.6–5.5)
PROPOXYPH UR QL: NEGATIVE NG/ML
PROT 24H UR-MRATE: NORMAL MG/D (ref 40–150)
PROT SERPL-MCNC: 6.9 G/DL (ref 6.3–8.2)
PROT SERPL-MCNC: 7.1 G/DL (ref 6–8.2)
PROT SERPL-MCNC: 7.2 G/DL (ref 6–8.2)
PROT SERPL-MCNC: 7.2 G/DL (ref 6–8.2)
PROT SERPL-MCNC: 7.4 G/DL (ref 6–8.2)
PROT SERPL-MCNC: 7.4 G/DL (ref 6–8.2)
PROT UR-MCNC: <4 MG/DL
PTH-INTACT SERPL-MCNC: 21.4 PG/ML (ref 14–72)
PTH-INTACT SERPL-MCNC: 21.9 PG/ML (ref 14–72)
RBC # BLD AUTO: 4.54 M/UL (ref 4.2–5.4)
SODIUM SERPL-SCNC: 131 MMOL/L (ref 135–145)
SODIUM SERPL-SCNC: 133 MMOL/L (ref 135–145)
SODIUM SERPL-SCNC: 133 MMOL/L (ref 135–145)
SODIUM SERPL-SCNC: 135 MMOL/L (ref 135–145)
SODIUM SERPL-SCNC: 136 MMOL/L (ref 135–145)
SODIUM SERPL-SCNC: 138 MMOL/L (ref 135–145)
SPECIMEN VOL ?TM UR: NORMAL ML
T3FREE SERPL-MCNC: 2.84 PG/ML (ref 2–4.4)
T4 FREE SERPL-MCNC: 1.17 NG/DL (ref 0.93–1.7)
T4 FREE SERPL-MCNC: 1.8 NG/DL (ref 0.93–1.7)
T4 FREE SERPL-MCNC: 2.16 NG/DL (ref 0.93–1.7)
TROPONIN T SERPL-MCNC: <6 NG/L (ref 6–19)
TSH RECEP AB SER-ACNC: <0.9 IU/L
TSH SERPL DL<=0.005 MIU/L-ACNC: 0.85 UIU/ML (ref 0.38–5.33)
TSH SERPL DL<=0.005 MIU/L-ACNC: 0.87 UIU/ML (ref 0.38–5.33)
TSH SERPL DL<=0.005 MIU/L-ACNC: <0.005 UIU/ML (ref 0.38–5.33)
TSH SERPL DL<=0.005 MIU/L-ACNC: <0.005 UIU/ML (ref 0.38–5.33)
TSI SER-ACNC: <0.1 IU/L
VIT B12 SERPL-MCNC: 2445 PG/ML (ref 211–911)
VIT B12 SERPL-MCNC: 427 PG/ML (ref 211–911)
WBC # BLD AUTO: 6.5 K/UL (ref 4.8–10.8)

## 2021-01-01 PROCEDURE — 84439 ASSAY OF FREE THYROXINE: CPT

## 2021-01-01 PROCEDURE — 82784 ASSAY IGA/IGD/IGG/IGM EACH: CPT

## 2021-01-01 PROCEDURE — 99999 PR NO CHARGE: CPT | Performed by: FAMILY MEDICINE

## 2021-01-01 PROCEDURE — 80053 COMPREHEN METABOLIC PANEL: CPT

## 2021-01-01 PROCEDURE — 36415 COLL VENOUS BLD VENIPUNCTURE: CPT

## 2021-01-01 PROCEDURE — G0299 HHS/HOSPICE OF RN EA 15 MIN: HCPCS

## 2021-01-01 PROCEDURE — 82306 VITAMIN D 25 HYDROXY: CPT

## 2021-01-01 PROCEDURE — 82232 ASSAY OF BETA-2 PROTEIN: CPT

## 2021-01-01 PROCEDURE — 84443 ASSAY THYROID STIM HORMONE: CPT

## 2021-01-01 PROCEDURE — 84445 ASSAY OF TSI GLOBULIN: CPT

## 2021-01-01 PROCEDURE — G0155 HHCP-SVS OF CSW,EA 15 MIN: HCPCS

## 2021-01-01 PROCEDURE — 99214 OFFICE O/P EST MOD 30 MIN: CPT | Mod: 95,CR | Performed by: INTERNAL MEDICINE

## 2021-01-01 PROCEDURE — G0151 HHCP-SERV OF PT,EA 15 MIN: HCPCS

## 2021-01-01 PROCEDURE — 90750 HZV VACC RECOMBINANT IM: CPT | Performed by: FAMILY MEDICINE

## 2021-01-01 PROCEDURE — G0157 HHC PT ASSISTANT EA 15: HCPCS | Mod: CQ

## 2021-01-01 PROCEDURE — 99215 OFFICE O/P EST HI 40 MIN: CPT | Performed by: FAMILY MEDICINE

## 2021-01-01 PROCEDURE — 99214 OFFICE O/P EST MOD 30 MIN: CPT | Performed by: FAMILY MEDICINE

## 2021-01-01 PROCEDURE — 84481 FREE ASSAY (FT-3): CPT

## 2021-01-01 PROCEDURE — S9126 HOSPICE CARE, IN THE HOME, P: HCPCS

## 2021-01-01 PROCEDURE — 84165 PROTEIN E-PHORESIS SERUM: CPT

## 2021-01-01 PROCEDURE — 82607 VITAMIN B-12: CPT

## 2021-01-01 PROCEDURE — 93248 EXT ECG>7D<15D REV&INTERPJ: CPT | Performed by: INTERNAL MEDICINE

## 2021-01-01 PROCEDURE — 83520 IMMUNOASSAY QUANT NOS NONAB: CPT

## 2021-01-01 PROCEDURE — 73700 CT LOWER EXTREMITY W/O DYE: CPT | Mod: LT,MF

## 2021-01-01 PROCEDURE — 83970 ASSAY OF PARATHORMONE: CPT

## 2021-01-01 PROCEDURE — 93005 ELECTROCARDIOGRAM TRACING: CPT | Performed by: EMERGENCY MEDICINE

## 2021-01-01 PROCEDURE — 90471 IMMUNIZATION ADMIN: CPT | Performed by: FAMILY MEDICINE

## 2021-01-01 PROCEDURE — 70551 MRI BRAIN STEM W/O DYE: CPT | Mod: MG

## 2021-01-01 PROCEDURE — 0001A PFIZER SARS-COV-2 VACCINE: CPT

## 2021-01-01 PROCEDURE — 93000 ELECTROCARDIOGRAM COMPLETE: CPT | Performed by: INTERNAL MEDICINE

## 2021-01-01 PROCEDURE — 0002A PFIZER SARS-COV-2 VACCINE: CPT | Performed by: INTERNAL MEDICINE

## 2021-01-01 PROCEDURE — 99213 OFFICE O/P EST LOW 20 MIN: CPT | Performed by: NURSE PRACTITIONER

## 2021-01-01 PROCEDURE — 99214 OFFICE O/P EST MOD 30 MIN: CPT | Mod: 25 | Performed by: FAMILY MEDICINE

## 2021-01-01 PROCEDURE — 84155 ASSAY OF PROTEIN SERUM: CPT | Mod: XU

## 2021-01-01 PROCEDURE — G0180 MD CERTIFICATION HHA PATIENT: HCPCS | Performed by: FAMILY MEDICINE

## 2021-01-01 PROCEDURE — 99000 SPECIMEN HANDLING OFFICE-LAB: CPT | Performed by: FAMILY MEDICINE

## 2021-01-01 PROCEDURE — 665001 SOC-HOME HEALTH

## 2021-01-01 PROCEDURE — 80053 COMPREHEN METABOLIC PANEL: CPT | Mod: 91

## 2021-01-01 PROCEDURE — 83520 IMMUNOASSAY QUANT NOS NONAB: CPT | Mod: 91

## 2021-01-01 PROCEDURE — G0493 RN CARE EA 15 MIN HH/HOSPICE: HCPCS

## 2021-01-01 PROCEDURE — 99204 OFFICE O/P NEW MOD 45 MIN: CPT | Performed by: INTERNAL MEDICINE

## 2021-01-01 PROCEDURE — 90715 TDAP VACCINE 7 YRS/> IM: CPT | Performed by: FAMILY MEDICINE

## 2021-01-01 PROCEDURE — 93246 EXT ECG>7D<15D RECORDING: CPT | Performed by: INTERNAL MEDICINE

## 2021-01-01 PROCEDURE — 36415 COLL VENOUS BLD VENIPUNCTURE: CPT | Performed by: FAMILY MEDICINE

## 2021-01-01 PROCEDURE — 70450 CT HEAD/BRAIN W/O DYE: CPT | Mod: ME

## 2021-01-01 PROCEDURE — 99211 OFF/OP EST MAY X REQ PHY/QHP: CPT | Performed by: INTERNAL MEDICINE

## 2021-01-01 PROCEDURE — 70553 MRI BRAIN STEM W/O & W/DYE: CPT | Mod: MG

## 2021-01-01 PROCEDURE — 82746 ASSAY OF FOLIC ACID SERUM: CPT

## 2021-01-01 PROCEDURE — 99213 OFFICE O/P EST LOW 20 MIN: CPT | Performed by: FAMILY MEDICINE

## 2021-01-01 PROCEDURE — 665036 HSPC NOTICE OF ELECTION NOE

## 2021-01-01 PROCEDURE — 91300 PFIZER SARS-COV-2 VACCINE: CPT | Performed by: INTERNAL MEDICINE

## 2021-01-01 PROCEDURE — 71045 X-RAY EXAM CHEST 1 VIEW: CPT

## 2021-01-01 PROCEDURE — A9576 INJ PROHANCE MULTIPACK: HCPCS | Performed by: FAMILY MEDICINE

## 2021-01-01 PROCEDURE — 700117 HCHG RX CONTRAST REV CODE 255: Performed by: FAMILY MEDICINE

## 2021-01-01 PROCEDURE — 80048 BASIC METABOLIC PNL TOTAL CA: CPT

## 2021-01-01 PROCEDURE — 72131 CT LUMBAR SPINE W/O DYE: CPT | Mod: ME

## 2021-01-01 PROCEDURE — 99285 EMERGENCY DEPT VISIT HI MDM: CPT

## 2021-01-01 PROCEDURE — 84100 ASSAY OF PHOSPHORUS: CPT

## 2021-01-01 PROCEDURE — G2212 PROLONG OUTPT/OFFICE VIS: HCPCS | Performed by: FAMILY MEDICINE

## 2021-01-01 PROCEDURE — 85025 COMPLETE CBC W/AUTO DIFF WBC: CPT

## 2021-01-01 PROCEDURE — 86334 IMMUNOFIX E-PHORESIS SERUM: CPT

## 2021-01-01 PROCEDURE — 84484 ASSAY OF TROPONIN QUANT: CPT

## 2021-01-01 PROCEDURE — 99214 OFFICE O/P EST MOD 30 MIN: CPT | Performed by: INTERNAL MEDICINE

## 2021-01-01 PROCEDURE — 91300 PFIZER SARS-COV-2 VACCINE: CPT

## 2021-01-01 PROCEDURE — 80307 DRUG TEST PRSMV CHEM ANLYZR: CPT

## 2021-01-01 RX ORDER — QUETIAPINE FUMARATE 25 MG/1
TABLET, FILM COATED ORAL
Qty: 90 TABLET | Refills: 0 | Status: SHIPPED | OUTPATIENT
Start: 2021-01-01 | End: 2021-01-01

## 2021-01-01 RX ORDER — ABALOPARATIDE 2000 UG/ML
80 INJECTION, SOLUTION SUBCUTANEOUS DAILY
Qty: 1.56 ML | Refills: 11 | Status: SHIPPED
Start: 2021-01-01 | End: 2021-01-01

## 2021-01-01 RX ORDER — FLUTICASONE PROPIONATE 50 MCG
1 SPRAY, SUSPENSION (ML) NASAL PRN
COMMUNITY
End: 2021-01-01

## 2021-01-01 RX ORDER — VITS A,C,E/LUTEIN/MINERALS 300MCG-200
1 TABLET ORAL DAILY
COMMUNITY
End: 2021-01-01

## 2021-01-01 RX ORDER — ALBUTEROL SULFATE 90 UG/1
2 AEROSOL, METERED RESPIRATORY (INHALATION) EVERY 4 HOURS PRN
Qty: 1 EACH | Refills: 11 | Status: SHIPPED | OUTPATIENT
Start: 2021-01-01 | End: 2021-01-01

## 2021-01-01 RX ORDER — MORPHINE SULFATE 100 MG/5ML
5-10 SOLUTION ORAL
Qty: 90 ML | Refills: 0
Start: 2021-01-01 | End: 2021-01-01 | Stop reason: SDUPTHER

## 2021-01-01 RX ORDER — TRAMADOL HYDROCHLORIDE 50 MG/1
50 TABLET ORAL EVERY 4 HOURS PRN
COMMUNITY
End: 2021-01-01 | Stop reason: SDUPTHER

## 2021-01-01 RX ORDER — TRAMADOL HYDROCHLORIDE 50 MG/1
50 TABLET ORAL
Qty: 30 TABLET | Refills: 0 | Status: SHIPPED | OUTPATIENT
Start: 2021-01-01 | End: 2021-01-01

## 2021-01-01 RX ORDER — LORAZEPAM 2 MG/ML
0.5 CONCENTRATE ORAL EVERY 6 HOURS PRN
Qty: 30 ML | Refills: 3 | Status: SHIPPED | OUTPATIENT
Start: 2021-01-01 | End: 2021-01-01 | Stop reason: SDUPTHER

## 2021-01-01 RX ORDER — HEPARIN SODIUM (PORCINE) LOCK FLUSH IV SOLN 100 UNIT/ML 100 UNIT/ML
500 SOLUTION INTRAVENOUS PRN
Status: CANCELLED | OUTPATIENT
Start: 2021-01-01

## 2021-01-01 RX ORDER — PAROXETINE HYDROCHLORIDE 40 MG/1
40 TABLET, FILM COATED ORAL DAILY
Qty: 100 TABLET | Refills: 3 | Status: SHIPPED | OUTPATIENT
Start: 2021-01-01 | End: 2021-01-01

## 2021-01-01 RX ORDER — ERGOCALCIFEROL 1.25 MG/1
50000 CAPSULE ORAL
Qty: 12 CAPSULE | Refills: 1 | Status: SHIPPED | OUTPATIENT
Start: 2021-01-01 | End: 2021-01-01 | Stop reason: SDUPTHER

## 2021-01-01 RX ORDER — FAMOTIDINE 40 MG/1
40 TABLET, FILM COATED ORAL
COMMUNITY
Start: 2021-01-01 | End: 2021-01-01

## 2021-01-01 RX ORDER — OXYBUTYNIN CHLORIDE 5 MG/1
5 TABLET, EXTENDED RELEASE ORAL DAILY
Qty: 100 TABLET | Refills: 3 | Status: SHIPPED | OUTPATIENT
Start: 2021-01-01 | End: 2021-01-01

## 2021-01-01 RX ORDER — SENNOSIDES 8.6 MG
650 CAPSULE ORAL EVERY 6 HOURS PRN
COMMUNITY
End: 2021-01-01

## 2021-01-01 RX ORDER — TRAMADOL HYDROCHLORIDE 50 MG/1
50 TABLET ORAL
Qty: 90 TABLET | Refills: 0 | Status: SHIPPED | OUTPATIENT
Start: 2021-01-01 | End: 2021-01-01

## 2021-01-01 RX ORDER — PAROXETINE HYDROCHLORIDE 40 MG/1
40 TABLET, FILM COATED ORAL DAILY
Qty: 100 TABLET | Refills: 3 | Status: SHIPPED | OUTPATIENT
Start: 2021-01-01 | End: 2021-01-01 | Stop reason: DRUGHIGH

## 2021-01-01 RX ORDER — FLUTICASONE PROPIONATE 50 MCG
1 SPRAY, SUSPENSION (ML) NASAL DAILY
Qty: 16 G | Refills: 11 | Status: SHIPPED | OUTPATIENT
Start: 2021-01-01 | End: 2021-01-01

## 2021-01-01 RX ORDER — LEVOTHYROXINE SODIUM 0.1 MG/1
100 TABLET ORAL
Qty: 90 TAB | Refills: 1 | Status: SHIPPED | OUTPATIENT
Start: 2021-01-01 | End: 2021-01-01

## 2021-01-01 RX ORDER — ERGOCALCIFEROL 1.25 MG/1
50000 CAPSULE ORAL
Qty: 12 CAPSULE | Refills: 1 | Status: SHIPPED | OUTPATIENT
Start: 2021-01-01 | End: 2021-01-01

## 2021-01-01 RX ORDER — ABALOPARATIDE 2000 UG/ML
80 INJECTION, SOLUTION SUBCUTANEOUS DAILY
Qty: 1.56 ML | Refills: 11 | Status: SHIPPED | OUTPATIENT
Start: 2021-01-01 | End: 2021-01-01 | Stop reason: SDUPTHER

## 2021-01-01 RX ORDER — 0.9 % SODIUM CHLORIDE 0.9 %
VIAL (ML) INJECTION PRN
Status: CANCELLED | OUTPATIENT
Start: 2021-01-01

## 2021-01-01 RX ORDER — SODIUM CHLORIDE 9 MG/ML
INJECTION, SOLUTION INTRAVENOUS CONTINUOUS
Status: CANCELLED | OUTPATIENT
Start: 2021-01-01

## 2021-01-01 RX ORDER — LEVOTHYROXINE SODIUM 0.07 MG/1
75 TABLET ORAL
Qty: 90 TABLET | Refills: 1 | Status: SHIPPED | OUTPATIENT
Start: 2021-01-01 | End: 2021-01-01 | Stop reason: SDUPTHER

## 2021-01-01 RX ORDER — ERGOCALCIFEROL 1.25 MG/1
50000 CAPSULE ORAL
Qty: 12 CAPSULE | Refills: 0 | Status: SHIPPED | OUTPATIENT
Start: 2021-01-01 | End: 2021-01-01 | Stop reason: SDUPTHER

## 2021-01-01 RX ORDER — DRONEDARONE 400 MG/1
400 TABLET, FILM COATED ORAL 2 TIMES DAILY WITH MEALS
Qty: 200 TABLET | Refills: 1 | Status: SHIPPED | OUTPATIENT
Start: 2021-01-01 | End: 2021-01-01

## 2021-01-01 RX ORDER — PAROXETINE 30 MG/1
30 TABLET, FILM COATED ORAL DAILY
Qty: 30 TABLET | Refills: 2 | Status: SHIPPED | OUTPATIENT
Start: 2021-01-01 | End: 2021-01-01 | Stop reason: DRUGHIGH

## 2021-01-01 RX ORDER — PAROXETINE HYDROCHLORIDE 20 MG/1
20 TABLET, FILM COATED ORAL DAILY
Qty: 100 TABLET | Refills: 3 | Status: SHIPPED | OUTPATIENT
Start: 2021-01-01 | End: 2021-01-01 | Stop reason: DRUGHIGH

## 2021-01-01 RX ORDER — 0.9 % SODIUM CHLORIDE 0.9 %
10 VIAL (ML) INJECTION PRN
Status: CANCELLED | OUTPATIENT
Start: 2021-01-01

## 2021-01-01 RX ORDER — LEVOTHYROXINE SODIUM 0.07 MG/1
75 TABLET ORAL
Qty: 90 TABLET | Refills: 1 | Status: SHIPPED | OUTPATIENT
Start: 2021-01-01 | End: 2021-01-01

## 2021-01-01 RX ORDER — NALOXONE HYDROCHLORIDE 4 MG/.1ML
SPRAY NASAL
Qty: 1 EACH | Refills: 1 | Status: SHIPPED | OUTPATIENT
Start: 2021-01-01 | End: 2021-01-01

## 2021-01-01 RX ORDER — MORPHINE SULFATE 100 MG/5ML
5 SOLUTION ORAL EVERY 4 HOURS PRN
Qty: 90 ML | Refills: 0 | Status: SHIPPED | OUTPATIENT
Start: 2021-01-01 | End: 2022-11-17

## 2021-01-01 RX ORDER — ERGOCALCIFEROL 1.25 MG/1
50000 CAPSULE ORAL
Status: SHIPPED | COMMUNITY
End: 2021-01-01 | Stop reason: SDUPTHER

## 2021-01-01 RX ORDER — ERGOCALCIFEROL 1.25 MG/1
50000 CAPSULE ORAL
Qty: 12 CAP | Refills: 0 | Status: SHIPPED | OUTPATIENT
Start: 2021-01-01 | End: 2021-01-01

## 2021-01-01 RX ORDER — METHYLPREDNISOLONE SODIUM SUCCINATE 125 MG/2ML
125 INJECTION, POWDER, LYOPHILIZED, FOR SOLUTION INTRAMUSCULAR; INTRAVENOUS PRN
Status: CANCELLED | OUTPATIENT
Start: 2021-01-01

## 2021-01-01 RX ORDER — EPINEPHRINE 1 MG/ML(1)
0.5 AMPUL (ML) INJECTION PRN
Status: CANCELLED | OUTPATIENT
Start: 2021-01-01

## 2021-01-01 RX ORDER — ALBUTEROL SULFATE 90 UG/1
2 AEROSOL, METERED RESPIRATORY (INHALATION) EVERY 4 HOURS PRN
Qty: 1 EACH | Refills: 0 | Status: SHIPPED | OUTPATIENT
Start: 2021-01-01 | End: 2021-01-01

## 2021-01-01 RX ORDER — LORAZEPAM 2 MG/ML
.5-1 CONCENTRATE ORAL EVERY 4 HOURS PRN
Qty: 30 ML | Refills: 3
Start: 2021-01-01 | End: 2022-11-18

## 2021-01-01 RX ORDER — DIPHENHYDRAMINE HYDROCHLORIDE 50 MG/ML
50 INJECTION INTRAMUSCULAR; INTRAVENOUS PRN
Status: CANCELLED | OUTPATIENT
Start: 2021-01-01

## 2021-01-01 RX ORDER — MORPHINE SULFATE 100 MG/5ML
15 SOLUTION ORAL
Qty: 90 ML | Refills: 0 | Status: SHIPPED | OUTPATIENT
Start: 2021-01-01 | End: 2022-11-21

## 2021-01-01 RX ORDER — DRONEDARONE 400 MG/1
400 TABLET, FILM COATED ORAL 2 TIMES DAILY WITH MEALS
Qty: 60 TABLET | Refills: 5 | Status: SHIPPED | OUTPATIENT
Start: 2021-01-01 | End: 2021-01-01 | Stop reason: SDUPTHER

## 2021-01-01 RX ORDER — LEVOTHYROXINE SODIUM 0.1 MG/1
100 TABLET ORAL
Qty: 90 TABLET | Refills: 1
Start: 2021-01-01

## 2021-01-01 RX ORDER — ZOLEDRONIC ACID 5 MG/100ML
5 INJECTION, SOLUTION INTRAVENOUS ONCE
Status: CANCELLED | OUTPATIENT
Start: 2021-01-01 | End: 2021-01-01

## 2021-01-01 RX ORDER — 0.9 % SODIUM CHLORIDE 0.9 %
3 VIAL (ML) INJECTION PRN
Status: CANCELLED | OUTPATIENT
Start: 2021-01-01

## 2021-01-01 RX ORDER — ERGOCALCIFEROL 1.25 MG/1
50000 CAPSULE ORAL
Qty: 12 CAPSULE | Refills: 0
Start: 2021-01-01

## 2021-01-01 RX ADMIN — GADOTERIDOL 10 ML: 279.3 INJECTION, SOLUTION INTRAVENOUS at 14:05

## 2021-01-01 ASSESSMENT — ENCOUNTER SYMPTOMS
LOWEST PAIN SEVERITY IN PAST 24 HOURS: 0/10
HIGHEST PAIN SEVERITY IN PAST 24 HOURS: 1/10
SUBJECTIVE PAIN PROGRESSION: GRADUALLY WORSENING
PAIN LOCATION - PAIN QUALITY: ACHY, SHARP
PAIN: 1
LOWEST PAIN SEVERITY IN PAST 24 HOURS: 0/10
HIGHEST PAIN SEVERITY IN PAST 24 HOURS: 6/10
POOR JUDGMENT: 1
PAIN SEVERITY GOAL: 0/10
HIGHEST PAIN SEVERITY IN PAST 24 HOURS: 3/10
FATIGUES EASILY: 1
SOMNOLENCE: 1
NAUSEA: DENIES
BOWEL INCONTINENCE: 1
SUBJECTIVE PAIN PROGRESSION: GRADUALLY IMPROVING
PAIN LOCATION: LEFT HIP
LAST BOWEL MOVEMENT: 66064
DIFFICULTY THINKING: 1
LAST BOWEL MOVEMENT: 66064
BOWEL INCONTINENCE: 1
PAIN LOCATION - EXACERBATING FACTORS: MOVEMENT
PERSON REPORTING PAIN: PATIENT
PAIN LOCATION - PAIN DURATION: SINCE FALL
STOOL FREQUENCY: DAILY
DENIES PAIN: 1
DENIES PAIN: 1
PAIN: 1
SNORING: 1
SUBJECTIVE PAIN PROGRESSION: GRADUALLY IMPROVING
FORGETFULNESS: 1
PERSON REPORTING PAIN: PATIENT
CONTUSION: 1
PAIN LOCATION - RELIEVING FACTORS: REST
PAIN LOCATION - PAIN FREQUENCY: WITH ACTIVITY
FORGETFULNESS: 1
TREMORS: 1
DIFFICULTY THINKING: 1
LAST BOWEL MOVEMENT: 66064
PAIN LOCATION: LEFT HIP
VOMITING: DENIES
PAIN SEVERITY GOAL: 0/10
STOOL FREQUENCY: DAILY
PAIN LOCATION: LEFT HIP
PAIN: 1
PAIN: 1
PAIN SEVERITY GOAL: 0/10
DENIES PAIN: 1
UNABLE TO COMMUNICATE PAIN: 1
PAIN LOCATION - PAIN SEVERITY: 5/10
HIGHEST PAIN SEVERITY IN PAST 24 HOURS: 6/10
UNABLE TO COMMUNICATE PAIN: 1
POOR JUDGMENT: 1
UNABLE TO COMMUNICATE PAIN: 1
SUBJECTIVE PAIN PROGRESSION: RAPIDLY IMPROVING
BOWEL INCONTINENCE: 1
HIGHEST PAIN SEVERITY IN PAST 24 HOURS: 9/10
LOWEST PAIN SEVERITY IN PAST 24 HOURS: 0/10
PERSON REPORTING PAIN: DIRECT OBSERVATION
PAIN SEVERITY GOAL: 0/10
DENIES PAIN: 1
POOR JUDGMENT: 1
FORGETFULNESS: 1
FATIGUES EASILY: 1
STOOL FREQUENCY: DAILY
LOWEST PAIN SEVERITY IN PAST 24 HOURS: 0/10
PERSON REPORTING PAIN: PATIENT
DENIES PAIN: 1
PAIN SEVERITY GOAL: 1/10
SNORING: 1
LOWEST PAIN SEVERITY IN PAST 24 HOURS: 1/10
SUBJECTIVE PAIN PROGRESSION: UNCHANGED
FATIGUE: 1
PERSON REPORTING PAIN: PATIENT
SOMNOLENCE: 1
PAIN LOCATION: HEAD
FATIGUES EASILY: 1

## 2021-01-01 ASSESSMENT — FIBROSIS 4 INDEX
FIB4 SCORE: 1.28
FIB4 SCORE: 1.03
FIB4 SCORE: 1.2
FIB4 SCORE: 1.03
FIB4 SCORE: 1.03
FIB4 SCORE: 1.2
FIB4 SCORE: 1.03

## 2021-01-01 ASSESSMENT — ACTIVITIES OF DAILY LIVING (ADL)
AMBULATION ASSISTANCE ON UNEVEN SURFACES: 1
TRANSPORTATION COMMENTS: REQUIRES ASSIST OF ANOTHER PERSON AND AD OUT OF HOME ON UNEVEN SURFACES
EATING_REQUIRES_ASSISTANCE: 1
AMBULATION ASSISTANCE ON FLAT SURFACES: 1
BATHING_REQUIRES_ASSISTANCE: 1
CONTINENCE_REQUIRES_ASSISTANCE: 1
AMBULATION_DISTANCE/DURATION_TOLERATED: 60 FEET
AMBULATION_REQUIRES_ASSISTANCE: 1
AMBULATION_DISTANCE/DURATION_TOLERATED: 300 FEET
OASIS_M1830: 03
DRESSING_REQUIRES_ASSISTANCE: 1
OASIS_M1830: 03
PHYSICAL_TRANSFER_REQUIRES_ASSISTANCE: 1
MONEY MANAGEMENT (EXPENSES/BILLS): TOTALLY DEPENDENT
AMBULATION ASSISTANCE ON FLAT SURFACES: 1
HOME_HEALTH_OASIS: 01
TRANSPORTATION COMMENTS: REQUIRES ASSIST OF ANOTHER PERSON AND AD OUT OF HOME

## 2021-01-01 ASSESSMENT — PAIN SCALES - PAIN ASSESSMENT IN ADVANCED DEMENTIA (PAINAD)
FACIALEXPRESSION: 0 - SMILING OR INEXPRESSIVE.
TOTALSCORE: 0
TOTALSCORE: 5
CONSOLABILITY: 0 - NO NEED TO CONSOLE.
BODYLANGUAGE: 0 - RELAXED.
FACIALEXPRESSION: 1 - SAD. FRIGHTENED. FROWN.
CONSOLABILITY: 0 - NO NEED TO CONSOLE.
BODYLANGUAGE: 0 - RELAXED.
BODYLANGUAGE: 0 - RELAXED.
NEGVOCALIZATION: 1 - OCCASIONAL MOAN OR GROAN. LOW-LEVEL SPEECH WITH A NEGATIVE OR DISAPPROVING QUALITY.
FACIALEXPRESSION: 0 - SMILING OR INEXPRESSIVE.
TOTALSCORE: 5
TOTALSCORE: 2
NEGVOCALIZATION: 1 - OCCASIONAL MOAN OR GROAN. LOW-LEVEL SPEECH WITH A NEGATIVE OR DISAPPROVING QUALITY.
NEGVOCALIZATION: 0 - NONE.
FACIALEXPRESSION: 0 - SMILING OR INEXPRESSIVE.
TOTALSCORE: 0
CONSOLABILITY: 1 - DISTRACTED OR REASSURED BY VOICE OR TOUCH.
FACIALEXPRESSION: 0 - SMILING OR INEXPRESSIVE.
NEGVOCALIZATION: 1 - OCCASIONAL MOAN OR GROAN. LOW-LEVEL SPEECH WITH A NEGATIVE OR DISAPPROVING QUALITY.
CONSOLABILITY: 0 - NO NEED TO CONSOLE.
BODYLANGUAGE: 0 - RELAXED.
NEGVOCALIZATION: 0 - NONE.
CONSOLABILITY: 1 - DISTRACTED OR REASSURED BY VOICE OR TOUCH.
BODYLANGUAGE: 1 - TENSE. DISTRESSED PACING. FIDGETING.
FACIALEXPRESSION: 1 - SAD. FRIGHTENED. FROWN.
NEGVOCALIZATION: 0 - NONE.
TOTALSCORE: 0
CONSOLABILITY: 0 - NO NEED TO CONSOLE.
BODYLANGUAGE: 0 - RELAXED.

## 2021-01-01 ASSESSMENT — PATIENT HEALTH QUESTIONNAIRE - PHQ9
1. LITTLE INTEREST OR PLEASURE IN DOING THINGS: NOT AT ALL
7. TROUBLE CONCENTRATING ON THINGS, SUCH AS READING THE NEWSPAPER OR WATCHING TELEVISION: SEVERAL DAYS
3. TROUBLE FALLING OR STAYING ASLEEP OR SLEEPING TOO MUCH: NOT AT ALL
SUM OF ALL RESPONSES TO PHQ QUESTIONS 1-9: 4
CLINICAL INTERPRETATION OF PHQ2 SCORE: 0
1. LITTLE INTEREST OR PLEASURE IN DOING THINGS: 00
8. MOVING OR SPEAKING SO SLOWLY THAT OTHER PEOPLE COULD HAVE NOTICED. OR THE OPPOSITE, BEING SO FIGETY OR RESTLESS THAT YOU HAVE BEEN MOVING AROUND A LOT MORE THAN USUAL: NOT AT ALL
2. FEELING DOWN, DEPRESSED, IRRITABLE, OR HOPELESS: NOT AT ALL
6. FEELING BAD ABOUT YOURSELF - OR THAT YOU ARE A FAILURE OR HAVE LET YOURSELF OR YOUR FAMILY DOWN: NOT AL ALL
SUM OF ALL RESPONSES TO PHQ9 QUESTIONS 1 AND 2: 0
9. THOUGHTS THAT YOU WOULD BE BETTER OFF DEAD, OR OF HURTING YOURSELF: NOT AT ALL
5. POOR APPETITE OR OVEREATING: NOT AT ALL
CLINICAL INTERPRETATION OF PHQ2 SCORE: 0
2. FEELING DOWN, DEPRESSED, IRRITABLE, OR HOPELESS: 00
4. FEELING TIRED OR HAVING LITTLE ENERGY: NEARLY EVERY DAY

## 2021-01-01 ASSESSMENT — COPD QUESTIONNAIRES: COPD: 1

## 2021-01-11 PROBLEM — M80.00XA AGE-RELATED OSTEOPOROSIS WITH CURRENT PATHOLOGICAL FRACTURE: Status: ACTIVE | Noted: 2021-01-01

## 2021-01-11 NOTE — PROGRESS NOTES
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  Patient was presented for a telehealth consultation via secure and encrypted videoconferencing technology. This encounter was conducted via Duetto. Verbal consent was obtained. Patient's identity was verified.      HPI:     Tania Aguilar is a 84 y.o. female here for follow up of the above medical issue.  In summary she has severe osteoporosis because of having a fragility fracture despite being on therapy with denosumab which was being given by another doctor.  Last bone density on November 21, 2019 showed the lowest T score of -2.7 for the left femur compatible with osteoporosis.  She was diagnosed with a T12 compression fracture on November 4, 2019 after a fall.   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  She is now being seen by Dr. Sinha and is being monitored for MGUS        She was on anabolic therapy with Tymlos 80 mcg subcu daily for her severe osteoporosis from Jan 2020 to June 2020.  She was unable to complete 24 months of therapy because of financial reasons.  She was supposed to apply for patient assistance back in October to December 2020 but was not able to do so because of COVID-19.   She is also taking calcium 600 mg daily and ergocalciferol 50,000 units weekly.        Since last visit patient reports feeling good.  She denies interval fracture.  She denies interval fall.  She denies interval nephrolithiasis.  She reports adherence to calcium supplementation recommendations.  She reports adherence to vitamin D supplementation.  Her activity level: no regular exercise, sedentary    Her last 25-hydroxy vitamin level was 66 on January 4, 2021        She has a history of iatrogenic hyperthyroidism because she was being given too much  thyroid hormone by another endocrinologist despite her history of atrial fibrillation and osteoporosis.  Originally she was taking levothyroxine 200+ Cytomel 25 mcg once a day    On follow-up she is now taking levothyroxine 125 MCG daily which has been her medication for the past 3 months  Her most recent TSH is still undetectable at less than 0.005 and free T4 is elevated 2.16 on January 4, 2021  She denies palpitations, tremors, insomnia and other hyperthyroid symptoms        Patient's medications, allergies, and social histories were reviewed and updated as appropriate.      ROS:       CONS:     No fever, no chills   EYES:     No diplopia, no blurry vision   CV:           No chest pain, no palpitations   PULM:     No SOB, no cough, no hemoptysis.   GI:            No nausea, no vomiting, no diarrhea, no constipation   ENDO:     No polyuria, no polydipsia, no heat intolerance, no cold intolerance     Past Medical History:  Problem List:  2021-01: Age-related osteoporosis with current pathological fracture  2020-11: Multiple myeloma not having achieved remission ([facility])  2020-08: Herpes zoster without complication  2020-01: Cough  2019-12: Bence Cleveland proteinuria  2019-12: Vitamin D deficiency  2019-11: Compression fracture of T12 vertebra with routine healing  2019-10: Breast pain, right  2019-02: Poor balance  2017-07: Mild intermittent asthma without complication  2016-09: Osteoporosis  2016-09: Seasonal allergies  2016-09: Prediabetes  2016-07: SEA on CPAP  2015-11: Weight loss  2015-11: Diarrhea  2015-11: Neck stiffness  2015-08: Memory loss, short term  2015-06: COPD exacerbation ([facility])  2015-05: Anemia  2015-05: Acute gastrointestinal hemorrhage  2015-05: Abdominal pain, other specified site  2015-05: Nausea & vomiting  2015-05: Atrial fibrillation ([facility])  2015-05: Hyponatremia  2015-05: SBO (small bowel obstruction) ([facility])  2015-05: Supratherapeutic INR  2015-02: Chronic anticoagulation  2015-02: MEDICAL  HOME  2015-01: COPD with exacerbation ([facility])  2015-01: Pneumonia  2015-01: Atrial fibrillation with RVR ([facility])  2013-12: Other and unspecified ovarian cyst  2011-06: Vertigo, benign positional  2011-05: B12 deficiency  2011-05: Hard of hearing  2009-08: Essential hypertension, benign  2009-08: Hypothyroidism  2009-08: Sinusitis due to Moraxella catarrhalis  2009-08: Allergic rhinitis  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  2009-07: Preventative health care  COPD (chronic obstructive pulmonary disease) ([facility])  Mixed incontinence urge and stress  CHI (closed head injury)  MDD (major depressive disorder), recurrent episode, moderate ([facility])  DJD (degenerative joint disease)  Asthma, mild intermittent, well-controlled  GERD (gastroesophageal reflux disease)  History of vulvar dysplasia  Heart murmur      Past Surgical History:  Past Surgical History:   Procedure Laterality Date   • GASTROSCOPY WITH BIOPSY N/A 5/7/2015    Procedure: GASTROSCOPY WITH BIOPSY;  Surgeon: Travis Jones M.D.;  Location: [facility];  Service:    • COLONOSCOPY WITH BIOPSY N/A 5/7/2015    Procedure: COLONOSCOPY WITH BIOPSY;  Surgeon: Travis Jones M.D.;  Location: [facility];  Service:    • HYSTERECTOMY ROBOTIC  12/27/2013    Performed by Zoltan Salazar M.D. at SURGERY [facility]   • CYSTOSCOPY STENT PLACEMENT  12/27/2013    Performed by Zoltan Salazar M.D. at [facility]   • OTHER       Removal Rt sinus osteoma   • OTHER SURGICAL PROCEDURE      ORx3 cancer removal (vulvar Ca.)   • THYROIDECTOMY       Due to thyroid cancern   • TONSILLECTOMY          Allergies:  Ace inhibitors and Albuterol     Social History:  Social History     Tobacco Use   • Smoking status: Passive Smoke Exposure - Never Smoker   • Smokeless tobacco: Never Used   Substance Use Topics   • Alcohol use: Yes     Alcohol/week: 0.6 oz     Types: 1 Standard drinks or equivalent per week     Comment: rarely   • Drug use: No        Family History:    family history includes Cancer in her brother, father, sister, and sister; Heart Disease in her mother and sister; Other in her sister.      PHYSICAL EXAM:   Vital signs: LMP  (LMP Unknown)   GENERAL: Elderly female in no apparent distress.    EYE:  No ocular asymmetry, PERRLA  HENT: Pink, moist mucous membranes.    NECK: No thyromegaly.   CARDIOVASCULAR:  No murmurs  LUNGS: Clear breath sounds  BACK: No spinal tenderness on palpation  ABDOMEN: Soft, nontender   EXTREMITIES: No clubbing, cyanosis, or edema.   NEUROLOGICAL: No gross focal motor abnormalities   LYMPH: No cervical adenopathy seen  SKIN: No rashes, lesions.       Labs:    Lab Results   Component Value Date/Time    WBC 5.4 10/19/2020 10:11 AM    WBC 5.5 03/04/2011 11:11 AM    RBC 4.29 10/19/2020 10:11 AM    RBC 4.27 03/04/2011 11:11 AM    HEMOGLOBIN 13.1 10/19/2020 10:11 AM    MCV 92.5 10/19/2020 10:11 AM    MCV 91 03/04/2011 11:11 AM    MCH 30.5 10/19/2020 10:11 AM    MCH 30.9 03/04/2011 11:11 AM    MCHC 33.0 (L) 10/19/2020 10:11 AM    RDW 50.2 (H) 10/19/2020 10:11 AM    RDW 13.8 03/04/2011 11:11 AM    MPV 10.4 10/19/2020 10:11 AM       Lab Results   Component Value Date/Time    SODIUM 138 01/04/2021 10:17 AM    POTASSIUM 5.4 01/04/2021 10:17 AM    CHLORIDE 106 01/04/2021 10:17 AM    CO2 26 01/04/2021 10:17 AM    ANION 6.0 (L) 01/04/2021 10:17 AM    GLUCOSE 106 (H) 01/04/2021 10:17 AM    BUN 17 01/04/2021 10:17 AM    CREATININE 0.86 01/04/2021 10:17 AM    CREATININE 0.63 04/30/2013 12:03 PM    CALCIUM 9.3 01/04/2021 10:17 AM    ASTSGOT 16 01/04/2021 10:17 AM    ALTSGPT 11 01/04/2021 10:17 AM    TBILIRUBIN 0.4 01/04/2021 10:17 AM    ALBUMIN 3.9 01/04/2021 10:17 AM    ALBUMIN 3.84 10/19/2020 10:11 AM    TOTPROTEIN 7.1 01/04/2021 10:17 AM    TOTPROTEIN 6.6 10/19/2020 10:11 AM    GLOBULIN 3.2 01/04/2021 10:17 AM    AGRATIO 1.2 01/04/2021 10:17 AM       Lab Results   Component Value Date/Time    TSHULTRASEN <0.005 (L) 11/14/2019 1028     Lab Results    Component Value Date/Time    FREET4 2.08 (H) 11/14/2019 1028     Lab Results   Component Value Date/Time    FREET3 3.75 11/14/2019 1028     No results found for: THYSTIMIG        Imaging:        ASSESSMENT/PLAN:     1. Age-related osteoporosis with current pathological fracture with routine healing, subsequent encounter  Unstable  She cannot afford Tymlos and was not able to apply for patient assistance in December because of COVID-19  Explained to patient that this is now the best time to apply for patient assistance for Tymlos because it will be covered for the rest of the year.  Patient is responsible for filling up the patient assistance form for radius  We will plan for follow-up in 3 months with repeat labs    2. Acquired hypothyroidism  Unstable TSH is still markedly low and free T4 is elevated  Adjust levothyroxine 100 mcg daily we will plan for repeat TSH levels in 3 months    3. Vitamin D deficiency  Controlled continue ergocalciferol repeat calcium 25-hydroxy panel 6 months    4. Compression fracture of T12 vertebra with routine healing  Stable she denies severe pain continue follow-up with orthopedics      Return in about 3 months (around 4/11/2021).      Thank you kindly for allowing me to participate in the endocrine care plan for this patient.    Toñito Luevano MD, [facility], [facility]  12/04/19    CC:   Ezequiel Grewal M.D.

## 2021-03-16 NOTE — TELEPHONE ENCOUNTER
NEW TO YOU PRE-VISIT PLANNING     Patient was NOT contacted to complete PVP.     Note: Patient will not be contacted if there is no indication to call.     1.  Reviewed notes from the last few office visits within the medical group: Yes    2.  If any orders were placed at last visit or intended to be done for this visit (i.e. 6 mos follow-up), do we have Results/Consult Notes?         •  Labs - Labs were not ordered at last office visit. Has lab orders from specialist  Note: If patient appointment is for lab review and patient did not complete labs, check with provider if OK to reschedule patient until labs completed.       •  Imaging - Imaging was not ordered at last office visit. Completed mammo in Dec.       •  Referrals - No referrals were ordered at last office visit.    3. Is this appointment scheduled as a Hospital Follow-Up? No    4.  Immunizations were updated in Epic using Reconcile Outside Information activity? Yes    5.  Patient is due for the following Health Maintenance Topics:   Health Maintenance Due   Topic Date Due   • IMM DTaP/Tdap/Td Vaccine (1 - Tdap) Never done   • Annual Pulmonary Function Test / Spirometry  05/17/2017   • IMM ZOSTER VACCINES (2 of 2) 02/03/2021       6.  AHA (Pulse8) form printed for Provider? Email sent to [facility] requesting form

## 2021-03-17 PROBLEM — Z66 DNR (DO NOT RESUSCITATE): Status: ACTIVE | Noted: 2021-01-01

## 2021-03-17 PROBLEM — N64.4 BREAST PAIN, RIGHT: Status: RESOLVED | Noted: 2019-10-04 | Resolved: 2021-01-01

## 2021-03-17 NOTE — ASSESSMENT & PLAN NOTE
Her mood is good on Paxil 10 mg daily.  Stable, continue current plan of care  Did stop it in the past for 6m, got grumpy off it.

## 2021-03-17 NOTE — ASSESSMENT & PLAN NOTE
TSH < 0.005, ft4 2.16 (on 200mcg synthroid per Dr. Ortiz prior), Dr. Luevano has been lowering the dose, now on 100mcd/d and feeling good on this. F/u labs this month.

## 2021-03-17 NOTE — ASSESSMENT & PLAN NOTE
Supplements with vitamin B12 1000 mcg every week.  She does this weekly.  This was continued by her prior PCP.

## 2021-03-17 NOTE — ASSESSMENT & PLAN NOTE
She is following with Dr. Galarza regarding her osteoporosis.  She did have a T12 compression fracture in 2019 despite being treated with Prolia which is why he chose to start her onTymlos, she has applied for patient assistance as the medication is quite expensive.  She is also on calcium and vitamin D.  I suggested she could consider mild weightbearing exercises like walking.  She does use a walker.

## 2021-03-17 NOTE — PROGRESS NOTES
Subjective:     CC:    Chief Complaint   Patient presents with   • Establish Care       HISTORY OF THE PRESENT ILLNESS: Patient is a 86 y.o. female. This pleasant patient is here today to establish care and discuss her routine PMH. Her prior PCP was Dr. Grewal.    Essential hypertension, benign  Had high BP when moved to [place] age 71 but not since, not on rx, doesn't monitor BP at home. Generally low salt diet.     Atrial fibrillation  January 2015: New onset, started on Diltiazem and Coumadin.  June 2015: Developed GI bleed, Coumadin stopped. Did not want to take ASA either  Per daughter h  The patient was admitt to the hospital in January 2015 for an atypical pneumonia.  She was treated with Xopenex which the patient believes might have triggered her atrial fibrillation.  She was treated with rate control and Coumadin for the atrial fibrillation but was readmitted May 2015 for GI bleed.  This was attributed to gastritis as well as gastric and colonic polyps which were removed as well as hemorrhoids both internal and external.  She has not been on Coumadin since that time.  She had an echocardiogram at the time of her diagnosis of atrial fibrillation which was negative for any valvular problems and had normal systolic function.    COPD (chronic obstructive pulmonary disease)  She does not have a history of smoking but her father was a heavy smoker and she has siblings who also have suffered with COPD because of their chronic exposures.  She has not had needed to use an inhaler in years.  She was admitted to the hospital in July 2011 for asthma, no history of intubations.    Hard of hearing  Wears hearing aids    Asthma, mild intermittent, well-controlled  I will refill an albuterol inhaler for her today, she has not needed one in years but I suggested it is good to keep one on hand.    GERD (gastroesophageal reflux disease)  This issue has resolved, she no longer needs omeprazole.  Is managed with lifestyle  improvements.    SEA on CPAP  She has seen pulmonary sleep medicine for this, she does use her CPAP but states her machine needs some repairs.  I suggested she get in touch with the sleep clinic.    Osteoporosis  She is following with Dr. Galarza regarding her osteoporosis.  She did have a T12 compression fracture in 2019 despite being treated with Prolia which is why he chose to start her onTymlos, she has applied for patient assistance as the medication is quite expensive.  She is also on calcium and vitamin D.  I suggested she could consider mild weightbearing exercises like walking.  She does use a walker.    Compression fracture of T12 vertebra with routine healing  Stable, no symptoms at this time.    Poor balance  stable, uses a walker when she goes outside.    Multiple myeloma not having achieved remission (HCC)  Sees Dr. Sinha every 3 months for this.  Stable, continue current plan of care  Says that Dr. 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    Hypothyroidism  TSH < 0.005, ft4 2.16 (on 200mcg synthroid per Dr. Ortiz prior), Dr. Luevano has been lowering the dose, now on 100mcd/d and feeling good on this. F/u labs this month.     B12 deficiency  Supplements with vitamin B12 1000 mcg every week.  She does this weekly.  This was continued by her prior PCP.    MDD (major depressive disorder), recurrent episode, moderate  Her mood is good on Paxil 10 mg daily.  Stable, continue current plan of care  Did stop it in the past for 6m, got grumpy off it.     Mixed incontinence urge and stress  Using ditropan 5mg/d. Currently sxs are under control. No side effects w/ this.     DNR (do not resuscitate)  Dnr/dni per d/w patient and dtr (POA) 21        Allergies: Ace inhibitors and Albuterol    Current Outpatient Medications Ordered in Epic   Medication Sig Dispense Refill   • albuterol 108 (90  Base) MCG/ACT Aero Soln inhalation aerosol Inhale 2 Puffs every four hours as needed for Shortness of Breath. 1 Each 0   • levothyroxine (SYNTHROID) 100 MCG Tab Take 1 Tab by mouth Every morning on an empty stomach. 90 Tab 1   • vitamin D, Ergocalciferol, (DRISDOL) 1.25 MG (98356 UT) Cap capsule Take 1 Cap by mouth every 7 days. 12 Cap 0   • ABALOPARATIDE 3120 MCG/1.56ML Inject 80 mcg under the skin every day. 1.56 mL 11   • PARoxetine (PAXIL) 10 MG Tab Take 1 Tab by mouth every day. 90 Tab 3   • oxybutynin SR (DITROPAN-XL) 5 MG TABLET SR 24 HR Take 1 Tab by mouth every day. 90 Tab 3   • cyanocobalamin (VITAMIN B-12) 1000 MCG/ML Solution 1 mL by Intramuscular route every 7 days. 12 mL 3   • Cholecalciferol (VITAMIN D3) 25 MCG Tab Take  by mouth.     • cetirizine (ZYRTEC) 10 MG Tab Take 10 mg by mouth every morning.       No current [facility]-ordered facility-administered medications on file.       Past Medical History:   Diagnosis Date   • Allergic rhinitis    • Arthritis     Hands   • ASTHMA    • Atrial fibrillation (HCC) January 2015    New onset. Echocardiogram with normal LV size, mild concentric LVH, LVEF 65-70%. Normal RA and LA. Mild MR, mild TR, RVSP 35-40mmHg. June 2015: GI bleed, Coumadin stopped/contraindicated.   • B12 deficiency    • Cancer (HCC)     Vulvar; skin; thyroid   • CHI (closed head injury)    • COPD (chronic obstructive pulmonary disease) (HCC)    • Degenerative joint disease    • Dental disorder     One broken tooth   • Depression    • GERD (gastroesophageal reflux disease)    • Hemorrhoids    • [place] (hard of hearing)      Bilaterally   • HSV-1 infection    • Hypertension    • Hypothyroidism    • MEDICAL HOME    • Meningitis    • Osteoporosis    • Shingles    • Sinusitis due to [place] catarrhalis    • Stress incontinence    • Thyroid cancer (HCC)     benign   • Vulvar cancer (HCC)        Past Surgical History:   Procedure Laterality Date   • GASTROSCOPY WITH BIOPSY N/A 5/7/2015    Procedure:  GASTROSCOPY WITH BIOPSY;  Surgeon: Travis Jones M.D.;  Location: [facility];  Service:    • COLONOSCOPY WITH BIOPSY N/A 2015    Procedure: COLONOSCOPY WITH BIOPSY;  Surgeon: Travis Jones M.D.;  Location: [facility];  Service:    • HYSTERECTOMY ROBOTIC  2013    Performed by Zoltan Salazar M.D. at SURGERY [facility]   • CYSTOSCOPY STENT PLACEMENT  2013    Performed by Zoltan Salazar M.D. at SURGERY [facility]   • OTHER       Removal Rt sinus osteoma   • OTHER SURGICAL PROCEDURE      ORx3 cancer removal (vulvar Ca.)   • THYROIDECTOMY       Due to thyroid cancern   • TONSILLECTOMY         Social History     Tobacco Use   • Smoking status: Passive Smoke Exposure - Never Smoker   • Smokeless tobacco: Never Used   Substance Use Topics   • Alcohol use: Yes     Alcohol/week: 0.6 oz     Types: 1 Standard drinks or equivalent per week     Comment: rarely   • Drug use: No       Social History     Social History Narrative    Has three children, raised them in [place]    She was  from her 2nd  at age 50 (he had been injured in the [place], and the treatment he had made things worse, he  at 55).     Moved to [place] age 71 bc 2 of her 3 kids are here.     She lives alone in a 1 story home, her kids are always bringing her leftovers, someone takes her shopping (stopped driving age 84 bc her kids said she swerved, had a bad fall in her 40s (down stairs outside, head first while holding the rail, and ever since has walked a little off, has had 3 surgeries, took 2y to find a tumor, was having spasms and other neurological issues, still occurs if you look in her ears, affected by air pressure, had the tumor removed, in her sinus cavity, had to return for further resection. Was benign but resected bc of effect on balance. Bc it took a long time to discover it some effects were permanent. Struggles to bend over bc gets vertigo.    This mostly affects her  "ability to bend over and hug her 24 grandchildren.    Also has someone come houseclean. Daughter helps her some w/ managing her finances. Daughter Etta Ferrer has POA.        Family History   Problem Relation Age of Onset   • Heart Disease Mother         [email or web address]   • Cancer Father         throat Cancer   • Heart Disease Sister    • Cancer Brother         brain tumor   • Cancer Sister         breast   • Cancer Sister         lung   • Other Sister         myeloma   • Hyperlipidemia Daughter    • Heart Disease Son        Health Maintenance: Completed    ROS:   See HPI      Objective:     Exam:   BP (!) 170/70 (BP Location: Right arm, Patient Position: Sitting, BP Cuff Size: Adult) Comment: 2nd /70  Pulse 76   Temp 37.1 °C (98.8 °F) (Temporal)   Resp 16   Ht 1.499 m (4' 11\")   Wt 55.4 kg (122 lb 3.2 oz)   LMP  (LMP Unknown)   SpO2 95%   BMI 24.68 kg/m²   Body mass index is 24.68 kg/m².  Wt Readings from Last 4 Encounters:   03/17/21 55.4 kg (122 lb 3.2 oz)   11/23/20 53.6 kg (118 lb 3.2 oz)   10/05/20 52.2 kg (115 lb)   08/25/20 53.5 kg (118 lb)     General: Normal appearing. No distress. Appropriately groomed.  HEENT: Normocephalic. Eyes conjunctiva clear lids without ptosis, pupils equal and reactive to light accommodation, ears normal shape and contour, canals are clear bilaterally, tympanic membranes are benign, nasal mucosa benign, oropharynx is without erythema, edema or exudates.   Neck: Supple without JVD or bruit. Thyroid is not enlarged.   Pulmonary: Clear to ausculation.  Normal effort. No rales, ronchi, or wheezing.  Cardiovascular: Regular rate and rhythm without murmur. Carotid and radial pulses are intact and equal bilaterally.  Abdomen: Soft, nontender, nondistended. Normal bowel sounds. Liver and spleen are not palpable  Neurologic: Grossly nonfocal  Lymph: No cervical or supraclavicular lymph nodes are palpable  Skin: Warm and dry.  No obvious lesions.  Musculoskeletal: Normal gait. No " extremity cyanosis, clubbing, or edema.  Psych: Normal mood and affect. Alert and oriented x3. Judgment and insight is normal.    EKG NSR    1/4/21 last labs  fbs 106  gfr >60  D66  TSH < 0.005, ft4 2.16   Assessment & Plan:   86 y.o. female with the following -    Overall she is doing very well.  I suggested that we check a heart monitor for a few weeks to make sure she does not have paroxysmal A. fib any longer.  There was a nurse practitioner who noted an irregular heart rhythm last year on her physical exam, I reviewed all of her physical exams over the past year and a half and no one else has noted anything but normal sinus rhythm.  If she does have A. fib we reviewed the benefits of taking anticoagulation as the etiologies for her prior GI bleed have been addressed by removal of the polyps.  She would be open to taking Xarelto if she does have A. fib.  We agreed that she should avoid using albuterol unless she is having a significant asthma exacerbation which would be unlikely.  1. Essential hypertension, benign    2. Paroxysmal atrial fibrillation (HCC)  - EKG - Clinic Performed  - RI Zio Patch Monitor; Future    3. Chronic obstructive pulmonary disease, unspecified COPD type ([facility])    4. Presbycusis of both ears    5. Asthma, mild intermittent, well-controlled    6. Gastroesophageal reflux disease without esophagitis    7. SEA on CPAP    8. Age-related osteoporosis with current pathological fracture with routine healing, subsequent encounter    9. Compression fracture of T12 vertebra with routine healing    10. Poor balance    11. Multiple myeloma not having achieved remission ([facility])    12. History of vulvar dysplasia    13. Acquired hypothyroidism    14. B12 deficiency  - VITAMIN B12; Future  - FOLATE; Future    15. MDD (major depressive disorder), recurrent episode, moderate ([facility])    16. Mixed incontinence urge and stress    17. DNR (do not resuscitate)    Other orders  - albuterol 108 (90 Base) MCG/ACT  Aero Soln inhalation aerosol; Inhale 2 Puffs every four hours as needed for Shortness of Breath.  Dispense: 1 Each; Refill: 0       Return in about 1 month (around 4/17/2021).    84 minutes spent with the patient and her daughter reviewing history as above plus reviewing her chart.    Please note that this dictation was created using voice recognition software. I have made every reasonable attempt to correct obvious errors, but I expect that there are errors of grammar and possibly content that I did not discover before finalizing the note.

## 2021-03-17 NOTE — ACP (ADVANCE CARE PLANNING)
Advance Care Planning Note    Discussion date:  3/17/2021  Discussion participants:  patient and daughter    The patient wishes to discuss Advanced Care Planning today and the following is a brief summary of our discussion.    Patient has capacity to make her own medical decisons    Advance Directives documents on file:  POLST per daughter, has at home    Communication of relevant diagnoses: has Essential hypertension, benign; Hypothyroidism; COPD (chronic obstructive pulmonary disease) ([facility]); B12 deficiency; Hard of hearing; Mixed incontinence urge and stress; MDD (major depressive disorder), recurrent episode, moderate ([facility]); DJD (degenerative joint disease); Asthma, mild intermittent, well-controlled; GERD (gastroesophageal reflux disease); History of vulvar dysplasia; Heart murmur; Atrial fibrillation ([facility]); SEA on CPAP; Osteoporosis; Seasonal allergies; Prediabetes; Poor balance; Compression fracture of T12 vertebra with routine healing; [place] proteinuria; Vitamin D deficiency; Cough; Herpes zoster without complication; Multiple myeloma not having achieved remission ([facility]); and Age-related osteoporosis with current pathological fracture on their problem list.      Communication of prognosis: good    Communication of treatment goals/options: continue current level of care    Treatment decisions: continue current level of care    Code status: do not resuscitate (DNR)        Lillie Pleitez M.D.

## 2021-03-17 NOTE — ASSESSMENT & PLAN NOTE
I will refill an albuterol inhaler for her today, she has not needed one in years but I suggested it is good to keep one on hand.

## 2021-03-17 NOTE — ASSESSMENT & PLAN NOTE
She has seen pulmonary sleep medicine for this, she does use her CPAP but states her machine needs some repairs.  I suggested she get in touch with the sleep clinic.

## 2021-03-17 NOTE — ASSESSMENT & PLAN NOTE
Sees Dr. Sinha every 3 months for this.  Stable, continue current plan of care  Says that Dr. Sinha has told her it is MM rather than just MGUS bc her sister  of this

## 2021-03-17 NOTE — ASSESSMENT & PLAN NOTE
Had high BP when moved to [place] age 71 but not since, not on rx, doesn't monitor BP at home. Generally low salt diet.

## 2021-03-17 NOTE — ASSESSMENT & PLAN NOTE
She does not have a history of smoking but her father was a heavy smoker and she has siblings who also have suffered with COPD because of their chronic exposures.  She has not had needed to use an inhaler in years.  She was admitted to the hospital in July 2011 for asthma, no history of intubations.

## 2021-03-17 NOTE — ASSESSMENT & PLAN NOTE
January 2015: New onset, started on Diltiazem and Coumadin.  June 2015: Developed GI bleed, Coumadin stopped. Did not want to take ASA either  Per daughter h  The patient was admitt to the hospital in January 2015 for an atypical pneumonia.  She was treated with Xopenex which the patient believes might have triggered her atrial fibrillation.  She was treated with rate control and Coumadin for the atrial fibrillation but was readmitted May 2015 for GI bleed.  This was attributed to gastritis as well as gastric and colonic polyps which were removed as well as hemorrhoids both internal and external.  She has not been on Coumadin since that time.  She had an echocardiogram at the time of her diagnosis of atrial fibrillation which was negative for any valvular problems and had normal systolic function.

## 2021-04-07 NOTE — TELEPHONE ENCOUNTER
Patient enrolled in the 14 day Zio XT Patch Holter monitoring program per Lillie Pleitez MD.  In-Clinic hookup.    >Currently pending EOS.

## 2021-04-07 NOTE — PROGRESS NOTES
Member's daughter called in to schedule Comprehensive Geriatric Assessment. Verified HIPPA. I scheduled the exam and provided all needed information. Used Epic and [employer].

## 2021-04-12 NOTE — PROGRESS NOTES
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  Patient was presented for a telehealth consultation via secure and encrypted videoconferencing technology. This encounter was conducted via [employer]. Verbal consent was obtained. Patient's identity was verified.      HPI:     Tania Aguilar is a 84 y.o. female here for follow up of the above medical issue.  In summary she has severe osteoporosis because of having a fragility fracture despite being on therapy with denosumab which was being given by another doctor.  Last bone density on November 21, 2019 showed the lowest T score of -2.7 for the left femur compatible with osteoporosis.  She was diagnosed with a T12 compression fracture on November 4, 2019 after a fall.   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  She is now being seen by Dr. Sinha and is being monitored for MGUS        She was on anabolic therapy with Tymlos 80 mcg subcu daily for her severe osteoporosis from Jan 2020 to June 2020.  Treatment was interrupted because of medication coverage and she was finally able to restart the anabolic medication for osteoporosis on February 1, 2020 and is ongoing.        Since last visit patient reports feeling good.  She denies interval fracture.  She denies interval fall.  She denies interval nephrolithiasis.  She reports adherence to calcium supplementation recommendations.  She reports adherence to vitamin D supplementation.  Her activity level: no regular exercise, sedentary    Her last 25-hydroxy vitamin level was 66 on January 4, 2021        She has a history of iatrogenic hyperthyroidism because she was being given too much thyroid hormone by another endocrinologist despite her history of atrial fibrillation and osteoporosis.  Originally she  was taking levothyroxine 200+ Cytomel 25 mcg once a day    On follow-up she is now taking levothyroxine 100 MCG daily which has been her medication for the past 3 months    Her most recent TSH is still undetectable at less than 0.005 and free T4 is 1.8 with a free T3 of 2.8 on April 2, 2021    Previously her free T4 was 2.16 on June 4, 2021 and TSH was suppressed while on levothyroxine 125    She denies palpitations, tremors, insomnia and other hyperthyroid symptoms        Patient's medications, allergies, and social histories were reviewed and updated as appropriate.      ROS:       CONS:     No fever, no chills   EYES:     No diplopia, no blurry vision   CV:           No chest pain, no palpitations   PULM:     No SOB, no cough, no hemoptysis.   GI:            No nausea, no vomiting, no diarrhea, no constipation   ENDO:     No polyuria, no polydipsia, no heat intolerance, no cold intolerance     Past Medical History:  Problem List:  2021-03: DNR (do not resuscitate)  2021-01: Age-related osteoporosis with current pathological fracture  2020-11: Multiple myeloma not having achieved remission (Formerly [facility])  2020-08: Herpes zoster without complication  2020-01: Cough  2019-12: Bence Cleveland proteinuria  2019-12: Vitamin D deficiency  2019-11: Compression fracture of T12 vertebra with routine healing  2019-10: Breast pain, right  2019-02: Poor balance  2017-07: Mild intermittent asthma without complication  2016-09: Osteoporosis  2016-09: Seasonal allergies  2016-09: Prediabetes  2016-07: SEA on CPAP  2015-11: Weight loss  2015-11: Diarrhea  2015-11: Neck stiffness  2015-08: Memory loss, short term  2015-06: COPD exacerbation (Formerly [facility])  2015-05: Anemia  2015-05: Acute gastrointestinal hemorrhage  2015-05: Abdominal pain, other specified site  2015-05: Nausea & vomiting  2015-05: Atrial fibrillation (Formerly [facility])  2015-05: Hyponatremia  2015-05: SBO (small bowel obstruction) (Formerly [facility])  2015-05: Supratherapeutic INR  2015-02: Chronic  anticoagulation  2015-02: MEDICAL HOME  2015-01: COPD with exacerbation (Formerly [facility])  2015-01: Pneumonia  2015-01: Atrial fibrillation with RVR ([facility]ly [facility])  2013-12: Other and unspecified ovarian cyst  2011-06: Vertigo, benign positional  2011-05: B12 deficiency  2011-05: Hard of hearing  2009-08: Essential hypertension, benign  2009-08: Hypothyroidism  2009-08: Sinusitis due to Moraxella catarrhalis  2009-08: Allergic rhinitis  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  2009-07: Preventative health care  COPD (chronic obstructive pulmonary disease) (Formerly [facility])  Mixed incontinence urge and stress  CHI (closed head injury)  MDD (major depressive disorder), recurrent episode, moderate ([facility]ly [facility])  DJD (degenerative joint disease)  Asthma, mild intermittent, well-controlled  GERD (gastroesophageal reflux disease)  History of vulvar dysplasia  Heart murmur      Past Surgical History:  Past Surgical History:   Procedure Laterality Date   • GASTROSCOPY WITH BIOPSY N/A 5/7/2015    Procedure: GASTROSCOPY WITH BIOPSY;  Surgeon: Travis Jones M.D.;  Location: [facility];  Service:    • COLONOSCOPY WITH BIOPSY N/A 5/7/2015    Procedure: COLONOSCOPY WITH BIOPSY;  Surgeon: Travis Jones M.D.;  Location: [facility];  Service:    • HYSTERECTOMY ROBOTIC  12/27/2013    Performed by Zoltan Salazar M.D. at SURGERY [facility]   • CYSTOSCOPY STENT PLACEMENT  12/27/2013    Performed by Zoltan Salazar M.D. at [facility]   • OTHER       Removal Rt sinus osteoma   • OTHER SURGICAL PROCEDURE      ORx3 cancer removal (vulvar Ca.)   • THYROIDECTOMY       Due to thyroid cancern   • TONSILLECTOMY          Allergies:  Ace inhibitors and Albuterol     Social History:  Social History     Tobacco Use   • Smoking status: Passive Smoke Exposure - Never Smoker   • Smokeless tobacco: Never Used   Substance Use Topics   • Alcohol use: Yes     Alcohol/week: 0.6 oz     Types: 1 Standard drinks or equivalent per week     Comment: rarely   • Drug  "use: No        Family History:   family history includes Cancer in her brother, father, sister, and sister; Heart Disease in her mother, sister, and son; Hyperlipidemia in her daughter; Other in her sister.      PHYSICAL EXAM:   Vital signs: /64 (BP Location: Left arm, Patient Position: Sitting, BP Cuff Size: Adult)   Pulse 80   Ht 1.499 m (4' 11\")   Wt 55 kg (121 lb 3.2 oz)   LMP  (LMP Unknown)   SpO2 95%   BMI 24.48 kg/m²   GENERAL: Elderly female in no apparent distress.    EYE:  No ocular asymmetry, PERRLA  HENT: Pink, moist mucous membranes.    NECK: No thyromegaly.   CARDIOVASCULAR:  No murmurs  LUNGS: Clear breath sounds  BACK: No spinal tenderness on palpation  ABDOMEN: Soft, nontender   EXTREMITIES: No clubbing, cyanosis, or edema.   NEUROLOGICAL: No gross focal motor abnormalities   LYMPH: No cervical adenopathy seen  SKIN: No rashes, lesions.       Labs:    Lab Results   Component Value Date/Time    WBC 5.4 10/19/2020 10:11 AM    WBC 5.5 03/04/2011 11:11 AM    RBC 4.29 10/19/2020 10:11 AM    RBC 4.27 03/04/2011 11:11 AM    HEMOGLOBIN 13.1 10/19/2020 10:11 AM    MCV 92.5 10/19/2020 10:11 AM    MCV 91 03/04/2011 11:11 AM    MCH 30.5 10/19/2020 10:11 AM    MCH 30.9 03/04/2011 11:11 AM    MCHC 33.0 (L) 10/19/2020 10:11 AM    RDW 50.2 (H) 10/19/2020 10:11 AM    RDW 13.8 03/04/2011 11:11 AM    MPV 10.4 10/19/2020 10:11 AM       Lab Results   Component Value Date/Time    SODIUM 133 (L) 04/02/2021 11:18 AM    POTASSIUM 4.2 04/02/2021 11:18 AM    CHLORIDE 98 04/02/2021 11:18 AM    CO2 27 04/02/2021 11:18 AM    ANION 8.0 04/02/2021 11:18 AM    GLUCOSE 91 04/02/2021 11:18 AM    BUN 11 04/02/2021 11:18 AM    CREATININE 0.81 04/02/2021 11:18 AM    CREATININE 0.63 04/30/2013 12:03 PM    CALCIUM 8.9 04/02/2021 11:18 AM    ASTSGOT 15 04/02/2021 11:18 AM    ALTSGPT 13 04/02/2021 11:18 AM    TBILIRUBIN 0.5 04/02/2021 11:18 AM    ALBUMIN 4.2 04/02/2021 11:18 AM    ALBUMIN 4.06 04/02/2021 11:18 AM    TOTPROTEIN " 7.2 04/02/2021 11:18 AM    TOTPROTEIN 6.9 04/02/2021 11:18 AM    GLOBULIN 3.0 04/02/2021 11:18 AM    AGRATIO 1.4 04/02/2021 11:18 AM       Lab Results   Component Value Date/Time    TSHULTRASEN <0.005 (L) 11/14/2019 1028     Lab Results   Component Value Date/Time    FREET4 2.08 (H) 11/14/2019 1028     Lab Results   Component Value Date/Time    FREET3 3.75 11/14/2019 1028     No results found for: THYSTIMIG        Imaging:        ASSESSMENT/PLAN:     1. Age-related osteoporosis with current pathological fracture with routine healing, subsequent encounter  Stable without interval fracture  Patient has severe osteoporosis at baseline with compression fractures  She is now back on anabolic agent therapy with Tymlos  Recommend that she continue calcium 600 mg twice a day  Recommend that she continue vitamin D replacement therapy  With ergocalciferol  Recommend regular exercise  I plan to repeat her bone density in November 2021  I will see her again in 6 months      2. Acquired hypothyroidism  Unstable she is improving but she is still iatrogenically hyperthyroid  But was difficult was she was placed on very massive doses of levothyroxine by another provider  Adjust levothyroxine 75 mcg daily we will plan for repeat TSH and free T4 levels in 3 months    3. Vitamin D deficiency  Controlled continue ergocalciferol repeat calcium 25-hydroxy panel 6 months    4. Compression fracture of T12 vertebra with routine healing  Stable denies any new fractures continue follow-up with orthopedics for pain management      Return in about 6 months (around 10/12/2021).      Thank you kindly for allowing me to participate in the endocrine care plan for this patient.    Toñito Luevano MD, FACE, [facility]  12/04/19    CC:   Ezequiel Grewal M.D.

## 2021-04-23 NOTE — PROGRESS NOTES
Chief Complaint   Patient presents with   • Follow-Up     SEA- Last seen 06/08/2020       HPI:      Mrs. Aguilar is a 87 y/o female patient who is in today SEA f/u. Past medical history of hypertension, hypothyroidism, atrial fibrillation, prediabetic, vitamin B12 deficiency, depression, heart murmur, tonsillectomy, passive smoke exposure-never smoker, allergic rhinitis, asthma, DJD, thyroid cancer, vulvar cancer, GERD, hard of hearing.    Patient is accompanied by her son today.  Patient states that she is missing the rubber seal that is required around the humidifying water chamber which has caused significant leaks and improper pressure with the CPAP and this is why her compliance is so low.  Her machine is 5 years old and she requests a new CPAP today.  Compliance report from 2/18/21-3/19/21 was downloaded and reviewed with the patient which showed CPAP 6 cmH2O, 80% compliance, 1 min 6 sec use, AHI of 0.0. She is tolerating the pressure and mask well. She goes to bed between 9-10 pm and wakes up at 10 am.  She is able to sleep through the night without any problems. She denies morning headache or snoring. She would like to start using the CPAP machine because she does notice that she feels more rested and sleeps better.  She keeps busy with yard work and chores around her home. She uses a single-point cane for assistance with ambulation. She denies any new health problems or medications.    Sleep Study History:   PSG titration from 2/3/20 indicated SEA. PLMS. The final pressure tested during the study was CPAP 6 cm water and at this final pressure the patient was observed in the supine but not REM sleep stage. The apnea hypopnea index improved to 1.4 per hour and O2 nya 90%. The average O2 stauration was 93%. She spent 0.7 min of sleep time below 89% O2 saturation. Snoring was resolved.    PSG from 6/2016 indicated an AHI of 67.1 and low oxygen of 84%.  Completed titration study in February 2020 for further  evaluation. The PAP titration was initiated with CPAP 5 cm of water and the pressure which was slowly titrated up in an attempt to eliminate sleep disordered breathing and snoring. The final pressure tested during the study was CPAP 6 cm water and at this final pressure the patient was observed in the supine but not REM sleep stage. The apnea hypopnea index improved to 1.4 per hour and O2 nya 90%. The average O2 stauration was 93%. She spent 0.7 min of sleep time below 89% O2 saturation. Snoring was resolved.     ROS:    Constitutional: Denies fevers, Denies weight changes  Eyes: Denies changes in vision, no eye pain  Ears/Nose/Throat/Mouth: Denies nasal congestion or sore throat   Cardiovascular: Denies chest pain or palpitations   Respiratory: Denies shortness of breath , Denies cough  Gastrointestinal/Hepatic: Denies abdominal pain, nausea, vomiting,   Skin/Breast: Denies rash,   Neurological: Denies headache, confusion,   Psychiatric: denies mood disorder   Sleep: denies snoring       Past Medical History:   Diagnosis Date   • Allergic rhinitis    • Arthritis     Hands   • ASTHMA    • Atrial fibrillation (HCC) January 2015    New onset. Echocardiogram with normal LV size, mild concentric LVH, LVEF 65-70%. Normal RA and LA. Mild MR, mild TR, RVSP 35-40mmHg. June 2015: GI bleed, Coumadin stopped/contraindicated.   • B12 deficiency    • Cancer (HCC)     Vulvar; skin; thyroid   • CHI (closed head injury)    • COPD (chronic obstructive pulmonary disease) (HCC)    • Degenerative joint disease    • Dental disorder     One broken tooth   • Depression    • GERD (gastroesophageal reflux disease)    • Hemorrhoids    • [place] (hard of hearing)      Bilaterally   • HSV-1 infection    • Hypertension    • Hypothyroidism    • MEDICAL HOME    • Meningitis    • Osteoporosis    • Shingles    • Sinusitis due to Moraxella catarrhalis    • Stress incontinence    • Thyroid cancer (HCC)     benign   • Vulvar cancer (HCC)        Past  Surgical History:   Procedure Laterality Date   • GASTROSCOPY WITH BIOPSY N/A 2015    Procedure: GASTROSCOPY WITH BIOPSY;  Surgeon: Travis Jones M.D.;  Location: [facility];  Service:    • COLONOSCOPY WITH BIOPSY N/A 2015    Procedure: COLONOSCOPY WITH BIOPSY;  Surgeon: Travis Jones M.D.;  Location: [facility];  Service:    • HYSTERECTOMY ROBOTIC  2013    Performed by Zoltan Salazar M.D. at SURGERY [facility]   • CYSTOSCOPY STENT PLACEMENT  2013    Performed by Zoltan Salazar M.D. at SURGERY [facility]   • OTHER       Removal Rt sinus osteoma   • OTHER SURGICAL PROCEDURE      ORx3 cancer removal (vulvar Ca.)   • THYROIDECTOMY       Due to thyroid cancern   • TONSILLECTOMY         Family History   Problem Relation Age of Onset   • Heart Disease Mother         [email or web address]   • Cancer Father         throat Cancer   • Heart Disease Sister    • Cancer Brother         brain tumor   • Cancer Sister         breast   • Cancer Sister         lung   • Other Sister         myeloma   • Hyperlipidemia Daughter    • Heart Disease Son        Social History     Socioeconomic History   • Marital status:      Spouse name: Not on file   • Number of children: Not on file   • Years of education: Not on file   • Highest education level: Not on file   Occupational History   • Not on file   Tobacco Use   • Smoking status: Passive Smoke Exposure - Never Smoker   • Smokeless tobacco: Never Used   Substance and Sexual Activity   • Alcohol use: Yes     Alcohol/week: 0.6 oz     Types: 1 Standard drinks or equivalent per week     Comment: rarely   • Drug use: No   • Sexual activity: Never   Other Topics Concern   • Not on file   Social History Narrative    Has three children, raised them in [place]    She was  from her 2nd  at age 50 (he had been injured in the [place] War, and the treatment he had made things worse, he  at 54).     Moved to [place] age 71 bc 2  of her 3 kids are here.     She lives alone in a 1 story home, her kids are always bringing her leftovers, someone takes her shopping (stopped driving age 84 bc her kids said she swerved, had a bad fall in her 40s (down stairs outside, head first while holding the rail, and ever since has walked a little off, has had 3 surgeries, took 2y to find a tumor, was having spasms and other neurological issues, still occurs if you look in her ears, affected by air pressure, had the tumor removed, in her sinus cavity, had to return for further resection. Was benign but resected bc of effect on balance. Bc it took a long time to discover it some effects were permanent. Struggles to bend over bc gets vertigo.    This mostly affects her ability to bend over and hug her 24 grandchildren.    Also has someone come houseclean. Daughter helps her some w/ managing her finances. Daughter Etta Ferrer has POA.      Social Determinants of Health     Financial Resource Strain:    • Difficulty of Paying Living Expenses:    Food Insecurity:    • Worried About Running Out of Food in the Last Year:    • Ran Out of Food in the Last Year:    Transportation Needs:    • Lack of Transportation (Medical):    • Lack of Transportation (Non-Medical):    Physical Activity:    • Days of Exercise per Week:    • Minutes of Exercise per Session:    Stress:    • Feeling of Stress :    Social Connections:    • Frequency of Communication with Friends and Family:    • Frequency of Social Gatherings with Friends and Family:    • Attends [employer] Services:    • Active Member of Clubs or Organizations:    • Attends Club or Organization Meetings:    • Marital Status:    Intimate Partner Violence:    • Fear of Current or Ex-Partner:    • Emotionally Abused:    • Physically Abused:    • Sexually Abused:        Allergies as of 04/23/2021 - Reviewed 04/12/2021   Allergen Reaction Noted   • Ace inhibitors  08/19/2009   • Albuterol  12/23/2013        Vitals:  Vitals:     04/23/21 1441   BP: 132/76   Pulse: 73   SpO2: 98%       Current medications as of today   Current Outpatient Medications   Medication Sig Dispense Refill   • levothyroxine (SYNTHROID) 75 MCG Tab Take 1 tablet by mouth every morning on an empty stomach. 90 tablet 1   • vitamin D, Ergocalciferol, (DRISDOL) 1.25 MG (93117 UT) Cap capsule Take 1 capsule by mouth every 7 days. 12 capsule 1   • albuterol 108 (90 Base) MCG/ACT Aero Soln inhalation aerosol Inhale 2 Puffs every four hours as needed for Shortness of Breath. 1 Each 0   • ABALOPARATIDE 3120 MCG/1.56ML Inject 80 mcg under the skin every day. 1.56 mL 11   • PARoxetine (PAXIL) 10 MG Tab Take 1 Tab by mouth every day. 90 Tab 3   • oxybutynin SR (DITROPAN-XL) 5 MG TABLET SR 24 HR Take 1 Tab by mouth every day. 90 Tab 3   • cyanocobalamin (VITAMIN B-12) 1000 MCG/ML Solution 1 mL by Intramuscular route every 7 days. (Patient not taking: Reported on 4/12/2021) 12 mL 3   • Cholecalciferol (VITAMIN D3) 25 MCG Tab Take  by mouth.     • cetirizine (ZYRTEC) 10 MG Tab Take 10 mg by mouth every morning.       No current facility-administered medications for this visit.         Physical Exam: Limited by COVID-19 precautions.  Appearance: Well developed, well nourished, no acute distress  Eyes: PERRL, EOM intact, sclera white, conjunctiva moist  Ears: no lesions or deformities  Hearing: grossly intact  Nose: no lesions or deformities  Respiratory effort: no intercostal retractions or use of accessory muscles  Extremities: no cyanosis or edema  Abdomen: soft   Gait and Station: normal  Digits and nails: no clubbing, cyanosis, petechiae or nodes.  Cranial nerves: grossly intact  Skin: no visible rashes, lesions or ulcers noted  Orientation: Oriented to time, person and place  Mood and affect: mood and affect appropriate, normal interaction with examiner  Judgement: Intact    Assessment:  1. SEA (obstructive sleep apnea)     2. Paroxysmal atrial fibrillation (HCC)     3.  Essential hypertension, benign     4. BMI 23.0-23.9, adult           Plan  Discussed the cardiovascular and neuropsychiatric risks of untreated SEA; including but not limited to: HTN, DM, MI, ASCVD, CVA, CHF, traffic accidents.     1. Compliance report from 2/18/21-3/19/21 was downloaded and reviewed with the patient which showed CPAP 6 cmH2O, 80% compliance, 1 min 6 sec use, AHI of 0.0. Continue CPAP. Patient is compliant and benefiting from CPAP therapy for management of SEA. Advised patient to use the CPAP every night for more than four hours for optimal health benefit and to meet the health insurance 70% compliance guideline.     *DME order ([facility]) for new CPAP 6 cmH2O mask (small Airfit F30i mask with small frame or MOC) and supplies was provided today. Continue to clean mask and supplies weekly with soap and water, and change supplies per insurance guidelines.     2-3. Continue to f/u with cardiology for Afib and BP management.   4. Continue to stay active.   5. Sleep hygiene discussed. Recommend keeping a set sleep/wake schedule. Logging enough hours of sleep. Limiting/Avoiding naps. No caffeine after noon and no heavy meals in the evening.   6. Follow up with the appropriate healthcare practitioners for all other medical problems and issues.  7. F/u in 3 months.       MARLEN Delacruz.      This dictation was created using voice recognition software. The accuracy of the dictation is limited to the abilities of the software. I expect there may be some errors of grammar and possibly content.

## 2021-04-23 NOTE — TELEPHONE ENCOUNTER
ESTABLISHED PATIENT PRE-VISIT PLANNING     Patient was NOT contacted to complete PVP.     Note: Patient will not be contacted if there is no indication to call.     1.  Reviewed notes from the last few office visits within the medical group: Yes    2.  If any orders were placed at last visit or intended to be done for this visit (i.e. 6 mos follow-up), do we have Results/Consult Notes?         •  Labs - Labs were not ordered at last office visit.  Note: If patient appointment is for lab review and patient did not complete labs, check with provider if OK to reschedule patient until labs completed.       •  Imaging - Imaging was not ordered at last office visit.       •  Referrals - No referrals were ordered at last office visit.    3. Is this appointment scheduled as a Hospital Follow-Up? No    4.  Immunizations were updated in Epic using Reconcile Outside Information activity? Yes    5.  Patient is due for the following Health Maintenance Topics:   Health Maintenance Due   Topic Date Due   • IMM DTaP/Tdap/Td Vaccine (1 - Tdap) Never done   • Annual Pulmonary Function Test / Spirometry  05/17/2017   • IMM ZOSTER VACCINES (2 of 2) 02/03/2021       6.  AHA (Pulse8) form printed for Provider? Email sent to [facility] requesting form

## 2021-04-28 NOTE — PROGRESS NOTES
Subjective:     CC:   Chief Complaint   Patient presents with   • Follow-Up     Labs          HPI:   Tania presents today with :      Has a group of friends she plays [employer] with, plans to host a [employer] party soon.     Problem   Atrial Fibrillation (Hcc)    2015: New onset, started on Diltiazem and Coumadin.  2015: Developed GI bleed, Coumadin stopped. Does not want to take ASA either.  Submitted zio patch 21 but it has not yet been resulted. No palpitations. Prefers to wait to see results before deciding whether to return to see cardiology. Denies any palpitations or CP or syncope or fatigue.      Copd (Chronic Obstructive Pulmonary Disease) ([facility])    Well controlled, rare use of inhalers, not in years.      B12 Deficiency    Stopped her B12 last month bc of elevated level. Can recheck in a few months. Might have had impaired absorption prior when taking PPI.      Mixed Incontinence Urge and Stress    Feels well managed on diptropan xl 5mg. Wants to continue this dose.      History of Vulvar Dysplasia    Last surgery in . Has yearly f/u with Dr. Salazar gyn onc.     Hypothyroidism    She saw endo 21, had her dose of synthroid lowered further to 75mcg/d. Has repeat labs pending.      Multiple Myeloma Not Having Achieved Remission ([facility])    Seeing Dr. Sinha for this. Her sister  of multiple myeloma. Is monitored closely.      Cough    Here w/ dtr - notes she has been coughing a lot. 3/14/21 Saw Dr. Robert WILKINS who did a f/u after her  cscope (had a few polyps removed) and endoscopy - she denied GERD but he rx'd pepcid to see if it would help her cough. Started it 3/15 - it hasn't helped the cough. Does have PND.        Osteoporosis    Seeing tanika for this.      Price On Cpap    Saw sleep provider 21 - is waiting to have her CPAP replaced to resume using it.             Current Outpatient Medications Ordered in Epic   Medication Sig Dispense Refill   • fluticasone (FLONASE) 50 MCG/ACT  nasal spray Administer 1 Spray into affected nostril(S) every day. 16 g 11   • levothyroxine (SYNTHROID) 75 MCG Tab Take 1 tablet by mouth every morning on an empty stomach. 90 tablet 1   • vitamin D, Ergocalciferol, (DRISDOL) 1.25 MG (34106 UT) Cap capsule Take 1 capsule by mouth every 7 days. 12 capsule 1   • albuterol 108 (90 Base) MCG/ACT Aero Soln inhalation aerosol Inhale 2 Puffs every four hours as needed for Shortness of Breath. 1 Each 0   • ABALOPARATIDE 3120 MCG/1.56ML Inject 80 mcg under the skin every day. 1.56 mL 11   • PARoxetine (PAXIL) 10 MG Tab Take 1 Tab by mouth every day. 90 Tab 3   • oxybutynin SR (DITROPAN-XL) 5 MG TABLET SR 24 HR Take 1 Tab by mouth every day. 90 Tab 3   • Cholecalciferol (VITAMIN D3) 25 MCG Tab Take  by mouth.     • cetirizine (ZYRTEC) 10 MG Tab Take 10 mg by mouth every morning.       No current [facility]-ordered facility-administered medications on file.       Past Medical History:   Diagnosis Date   • Allergic rhinitis    • Arthritis     Hands   • ASTHMA    • Atrial fibrillation (HCC) January 2015    New onset. Echocardiogram with normal LV size, mild concentric LVH, LVEF 65-70%. Normal RA and LA. Mild MR, mild TR, RVSP 35-40mmHg. June 2015: GI bleed, Coumadin stopped/contraindicated.   • B12 deficiency    • Cancer (HCC)     Vulvar; skin; thyroid   • CHI (closed head injury)    • COPD (chronic obstructive pulmonary disease) (HCC)    • Degenerative joint disease    • Dental disorder     One broken tooth   • Depression    • GERD (gastroesophageal reflux disease)    • Hemorrhoids    • [place] (hard of hearing)      Bilaterally   • HSV-1 infection    • Hypertension    • Hypothyroidism    • MEDICAL HOME    • Meningitis    • Osteoporosis    • Shingles    • Sinusitis due to Moraxella catarrhalis    • Stress incontinence    • Thyroid cancer (HCC)     benign   • Vulvar cancer (HCC)        Social History     Tobacco Use   • Smoking status: Passive Smoke Exposure - Never Smoker   • Smokeless  "tobacco: Never Used   Substance Use Topics   • Alcohol use: Yes     Alcohol/week: 0.6 oz     Types: 1 Standard drinks or equivalent per week     Comment: rarely   • Drug use: No       Allergies:  Ace inhibitors and Albuterol    Health Maintenance: Completed    ROS:  Per HPI  No wheezing or sob.     Objective:       Exam:  /70 (BP Location: Left arm)   Pulse 70   Temp 37.2 °C (98.9 °F) (Temporal)   Resp 14   Ht 1.499 m (4' 11\")   Wt 55.7 kg (122 lb 14.4 oz)   LMP  (LMP Unknown)   SpO2 95%   BMI 24.82 kg/m²   Body mass index is 24.82 kg/m².  Wt Readings from Last 4 Encounters:   04/28/21 55.7 kg (122 lb 14.4 oz)   04/23/21 53.1 kg (117 lb)   04/12/21 55 kg (121 lb 3.2 oz)   03/17/21 55.4 kg (122 lb 3.2 oz)       Gen: Alert and oriented, No apparent distress. Appropriately groomed.  Neck: Neck is supple without lymphadenopathy.No thyromegaly.   Lungs: Normal effort, CTA bilaterally, no wheezes, rhonchi, or rales  CV: Regular rate and rhythm. No murmurs, rubs, or gallops.  Ext: No clubbing, cyanosis, edema.  Skin: No rash noted.      Assessment & Plan:     86 y.o. female with the following -   D/c the pepcid as unlikely gerd as cough didn't improve w/ this.   Resume flonase as cough likely 2/2 pnd  Do tdap at f/u appt. Finish shingrix today  Repeat BP wnl - had been excited earlier talking w/ dtr. Will monitor.   If wnl zio patch can defer cards f/u, if abnl will d/w her and dtr re: what to do next    1. SEA on CPAP    2. Cough    3. Acquired hypothyroidism    4. Paroxysmal atrial fibrillation (HCC)    5. Chronic obstructive pulmonary disease, unspecified COPD type (HCC)    6. Age-related osteoporosis with current pathological fracture with routine healing, subsequent encounter    7. Multiple myeloma not having achieved remission (HCC)    8. B12 deficiency  - VITAMIN B12; Future    9. History of vulvar dysplasia    10. Mixed incontinence urge and stress    11. Need for shingles vaccine  - Shingles Vaccine " (Shingrix)    12. Need for diphtheria-tetanus-pertussis (Tdap) vaccine    Other orders  - fluticasone (FLONASE) 50 MCG/ACT nasal spray; Administer 1 Spray into affected nostril(S) every day.  Dispense: 16 g; Refill: 11            Return in about 3 months (around 7/28/2021).    Please note that this dictation was created using voice recognition software. I have made every reasonable attempt to correct obvious errors, but I expect that there are errors of grammar and possibly content that I did not discover before finalizing the note.

## 2021-05-05 NOTE — TELEPHONE ENCOUNTER
Please call the patient and her daughter and let them know that her Zio patch is suggesting that she does have atrial fibrillation.  I would like her to see a cardiologist.if she is agreeable to this then please cue up the referral and I will sign it.  If she wants to talk about it further just tell her I will call her tomorrow and let me know approximately what time she is available.

## 2021-05-11 NOTE — TELEPHONE ENCOUNTER
ESTABLISHED PATIENT PRE-VISIT PLANNING     Patient was NOT contacted to complete PVP.     Note: Patient will not be contacted if there is no indication to call.     1.  Reviewed notes from the last few office visits within the medical group: Yes    2.  If any orders were placed at last visit or intended to be done for this visit (i.e. 6 mos follow-up), do we have Results/Consult Notes?         •  Labs - Vitamin B12 lab not yet completed  Note: If patient appointment is for lab review and patient did not complete labs, check with provider if OK to reschedule patient until labs completed.       •  Imaging - Imaging was not ordered at last office visit.       •  Referrals -  Cardiology referral pending    3. Is this appointment scheduled as a Hospital Follow-Up? No    4.  Immunizations were updated in Epic using Reconcile Outside Information activity? Yes    5.  Patient is due for the following Health Maintenance Topics:   Health Maintenance Due   Topic Date Due   • IMM DTaP/Tdap/Td Vaccine (1 - Tdap) Never done   • Annual Pulmonary Function Test / Spirometry  05/17/2017       6.  AHA (Pulse8) form printed for Provider? Email sent to [facility] [place] requesting form

## 2021-05-13 PROBLEM — R09.82 PND (POST-NASAL DRIP): Status: ACTIVE | Noted: 2021-01-01

## 2021-05-13 NOTE — PROGRESS NOTES
Subjective:     CC:   Chief Complaint   Patient presents with   • Follow-Up     Results Review          HPI:   Tania presents today with follow-up from her visit with me April 28 at which time I stopped her Pepcid as her reflux did not improve with that and resumed her Flonase for her chronic cough from postnasal drip.  She is due for a tetanus vaccine today.      Problem   Pnd (Post-Nasal Drip)    Her PND and cough are improving, only seems to have symptoms when she forgets to use the flonase. Also on zyrtec.        Bence [place] Proteinuria    Sees Dr. Sinha     Compression Fracture of T12 Vertebra With Routine Healing    On tymlos per her endo.      Osteoporosis    Seeing endo for this. On tymlos per endo     Price On Cpap    Saw sleep provider 4/23/21 - is waiting to have her CPAP replaced to resume using it     Memory Problem    Has been noting this for some time. Would like brain imaging. mmse 26/30 today (off for county, only remembered 2/3 at 5min, unable to name a pen, called it a pencil multiple times), and abnl clock draw - set to 1150. Defers neuropsych testing for now. incr paxil per request.      Atrial Fibrillation (Hcc)    January 2015: New onset, started on Diltiazem and Coumadin.  June 2015: Developed GI bleed, Coumadin stopped. Does not want to take ASA either.  Submitted zio patch 4/21/21 with concern for parox afib.  No palpitations or CP or syncope or fatigue.   I have received the results of her Zio patch suggesting that she does have atrial fibrillation, she was agreeable to seeing a cardiologist. I made the referral 5/6/21, she hasn't yet heard from referrals.   Has been using otc med for an upset stomach, past few days, attributed it to having company from [place] recently     Mixed Incontinence Urge and Stress    Feels well managed on diptropan xl 5mg. Wants to continue this dose     MDD (major depressive disorder), recurrent episode, moderate (HCC)    Improving with 10mg paxil. Had taken 20mg in  the past. The 10mg is helping but would like to incr dose to help w/ irritability.     Asthma, Mild Intermittent, Well-Controlled    Rare use of alb mdi prn.      Gerd (Gastroesophageal Reflux Disease)    Rare use of otc med. Resolving cough more d/t PND.      History of Vulvar Dysplasia    Last surgery in 2005. Has yearly f/u with Dr. Salazar gyn onc     Essential Hypertension, Benign    Diet controlled.      Hypothyroidism    She saw endo 4/12/21, had her dose of synthroid lowered further to 75mcg/d. Has repeat labs pending. Managed by endo.          Current Outpatient Medications Ordered in Epic   Medication Sig Dispense Refill   • PARoxetine (PAXIL) 20 MG Tab Take 1 tablet by mouth every day. 100 tablet 3   • fluticasone (FLONASE) 50 MCG/ACT nasal spray Administer 1 Spray into affected nostril(S) every day. 16 g 11   • levothyroxine (SYNTHROID) 75 MCG Tab Take 1 tablet by mouth every morning on an empty stomach. 90 tablet 1   • vitamin D, Ergocalciferol, (DRISDOL) 1.25 MG (41660 UT) Cap capsule Take 1 capsule by mouth every 7 days. 12 capsule 1   • albuterol 108 (90 Base) MCG/ACT Aero Soln inhalation aerosol Inhale 2 Puffs every four hours as needed for Shortness of Breath. 1 Each 0   • ABALOPARATIDE 3120 MCG/1.56ML Inject 80 mcg under the skin every day. 1.56 mL 11   • oxybutynin SR (DITROPAN-XL) 5 MG TABLET SR 24 HR Take 1 Tab by mouth every day. 90 Tab 3   • Cholecalciferol (VITAMIN D3) 25 MCG Tab Take  by mouth.     • cetirizine (ZYRTEC) 10 MG Tab Take 10 mg by mouth every morning.       No current [facility]-ordered facility-administered medications on file.       Past Medical History:   Diagnosis Date   • Allergic rhinitis    • Arthritis     Hands   • ASTHMA    • Atrial fibrillation (HCC) January 2015    New onset. Echocardiogram with normal LV size, mild concentric LVH, LVEF 65-70%. Normal RA and LA. Mild MR, mild TR, RVSP 35-40mmHg. June 2015: GI bleed, Coumadin stopped/contraindicated.   • B12 deficiency    •  "Cancer (HCC)     Vulvar; skin; thyroid   • CHI (closed head injury)    • COPD (chronic obstructive pulmonary disease) (HCC)    • Degenerative joint disease    • Dental disorder     One broken tooth   • Depression    • GERD (gastroesophageal reflux disease)    • Hemorrhoids    • [place] (hard of hearing)      Bilaterally   • HSV-1 infection    • Hypertension    • Hypothyroidism    • MEDICAL HOME    • Meningitis    • Osteoporosis    • Shingles    • Sinusitis due to Moraxella catarrhalis    • Stress incontinence    • Thyroid cancer (HCC)     benign   • Vulvar cancer (HCC)        Social History     Tobacco Use   • Smoking status: Passive Smoke Exposure - Never Smoker   • Smokeless tobacco: Never Used   Vaping Use   • Vaping Use: Never used   Substance Use Topics   • Alcohol use: Yes     Alcohol/week: 0.6 oz     Types: 1 Standard drinks or equivalent per week     Comment: rarely   • Drug use: No       Allergies:  Ace inhibitors and Albuterol    Health Maintenance: Completed    ROS:  Per HPI      Objective:       Exam:  /60 (BP Location: Right arm, Patient Position: Sitting, BP Cuff Size: Adult)   Pulse 87   Temp 37 °C (98.6 °F) (Temporal)   Resp 14   Ht 1.499 m (4' 11\")   Wt 56.8 kg (125 lb 4.8 oz)   LMP  (LMP Unknown)   SpO2 94%   BMI 25.31 kg/m²   Body mass index is 25.31 kg/m².  Wt Readings from Last 4 Encounters:   05/13/21 56.8 kg (125 lb 4.8 oz)   04/28/21 55.7 kg (122 lb 14.4 oz)   04/23/21 53.1 kg (117 lb)   04/12/21 55 kg (121 lb 3.2 oz)       Gen: Alert and oriented, No apparent distress. Appropriately groomed.  Neck: Neck is supple without lymphadenopathy.No thyromegaly.   Lungs: Normal effort, CTA bilaterally, no wheezes, rhonchi, or rales.  CV: irRegularlyt irregular rhythm, wnl rate. No murmurs, rubs, or gallops.  ABD:  Soft, nontender, nondistended, NABSx4, no HSM or RBT or guarding or masses.  Ext: No clubbing, cyanosis, edema.  Skin: No rash noted.      Labs: pending per endo  Mini-Mental " Status exam today was 26 out of 40, she was able to draw clock appropriately but not able to set the time properly.  She it at 10 minutes before 11 rather than 11:10.    Assessment & Plan:     86 y.o. female with the following -     I will check a brain MRI per her request.  She defers neuropsych testing at this time.  We can also go up on the Paxil to 20 mg/day to help with her irritability.  I will see her back in approximately 6 weeks.    1. PND (post-nasal drip)    2. Compression fracture of T12 vertebra with routine healing    3. Essential hypertension, benign    4. Acquired hypothyroidism    5. Mixed incontinence urge and stress    6. MDD (major depressive disorder), recurrent episode, moderate (HCC)    7. Asthma, mild intermittent, well-controlled    8. Gastroesophageal reflux disease without esophagitis    9. History of vulvar dysplasia    10. Paroxysmal atrial fibrillation (HCC)    11. Need for vaccination  - Tdap =>6yo IM    12. SEA on CPAP    13. Age-related osteoporosis with current pathological fracture with routine healing, subsequent encounter    14. Bence [place] proteinuria    15. Memory problem  - MR-BRAIN-W/O; Future    Other orders  - PARoxetine (PAXIL) 20 MG Tab; Take 1 tablet by mouth every day.  Dispense: 100 tablet; Refill: 3          Return in about 6 weeks (around 6/24/2021), or mri.    Please note that this dictation was created using voice recognition software. I have made every reasonable attempt to correct obvious errors, but I expect that there are errors of grammar and possibly content that I did not discover before finalizing the note.

## 2021-06-03 NOTE — PROGRESS NOTES
Reminder-Comprehensive Health Assessment. Member was a bit confused about upcoming appointment. She stated she didn't know who scheduled this. She will have her daughter Etta Jaquez call us back tomorrow to go over this. Verified HIPAA.

## 2021-06-07 PROBLEM — I77.9 DISORDER OF ARTERIES AND ARTERIOLES (HCC): Status: ACTIVE | Noted: 2021-01-01

## 2021-06-07 PROBLEM — J96.10 CHRONIC RESPIRATORY FAILURE (HCC): Status: ACTIVE | Noted: 2021-01-01

## 2021-06-07 PROBLEM — H35.30 MACULAR DEGENERATION: Status: ACTIVE | Noted: 2021-01-01

## 2021-06-07 PROBLEM — C51.9 VULVAR CANCER (HCC): Status: ACTIVE | Noted: 2021-01-01

## 2021-06-28 NOTE — TELEPHONE ENCOUNTER
Chart Prep    Spoke to patient she has not had any testing or seen a previous cardiology besides Ratna Irving in 2016. The only recent testing patient states had done was the Zio patch which is in media. Patient confirmed date time and location of appointment with SS 6-30-19 @ 11am.

## 2021-06-29 NOTE — PROGRESS NOTES
"Subjective:     CC:   Chief Complaint   Patient presents with   • Follow-Up     MRI          HPI:   Tania presents today with f/u from our 5/13/21 visit. Here today w/ her daughter Etta.   Had abnl zio patch, 5/3 - concern for paf - h/o taking blood thinners - coumadin, in the past, felt \"up and down on them\" - has appt with cardiology this week.     Problem   Mild Cognitive Impairment    mmse 26/30 5/21 (off for county, only remembered 2/3 at 5min, unable to name a pen, called it a pencil multiple times), and abnl clock draw - set to 1150. Defers neuropsych testing for now. She has deferred neuropsych testing. She has difficulty with word-finding. She struggles to remember things eg yesterday updated her calendar and she had to repeat what days she was changing appts to.   5/21 I increased her paxil to 20mg.   She lives alone, no longer drives, cooks some meals, daughter brings leftovers as well. Goes to son's for dinner after [employer] on Sundays.     MRI showed :   Mild generalized volume loss. No disproportionate hippocampal or temporal lobe volume loss is seen.  Confluent and scattered T2 hyperintensities in the periventricular, deep and subcortical cerebral white matter and mild patchy in the samaria are nonspecific, most likely representing chronic microvascular ischemic changes. Findings have increased from the   prior brain MRI.  No acute intracranial hemorrhage, extra-axial fluid collection, mass effect, midline shift or hydrocephalus. No restricted diffusion to suggest acute infarct.  Proximal vascular flow voids are patent.  Paranasal sinuses and mastoids are clear.  Bone marrow signal is normal. Both globes demonstrate prior lens surgery.  IMPRESSION:  1.  Advanced chronic microvascular ischemic type changes.  2.  Mild age-related volume loss.  3.  No acute intracranial abnormality.     Mixed Incontinence Urge and Stress    Feels well managed on diptropan xl 5mg. Wants to continue this dose.         Current " Outpatient Medications Ordered in Epic   Medication Sig Dispense Refill   • PARoxetine (PAXIL) 20 MG Tab Take 1 tablet by mouth every day. 100 tablet 3   • fluticasone (FLONASE) 50 MCG/ACT nasal spray Administer 1 Spray into affected nostril(S) every day. 16 g 11   • levothyroxine (SYNTHROID) 75 MCG Tab Take 1 tablet by mouth every morning on an empty stomach. 90 tablet 1   • vitamin D, Ergocalciferol, (DRISDOL) 1.25 MG (07773 UT) Cap capsule Take 1 capsule by mouth every 7 days. 12 capsule 1   • albuterol 108 (90 Base) MCG/ACT Aero Soln inhalation aerosol Inhale 2 Puffs every four hours as needed for Shortness of Breath. 1 Each 0   • oxybutynin SR (DITROPAN-XL) 5 MG TABLET SR 24 HR Take 1 Tab by mouth every day. 90 Tab 3   • Cholecalciferol (VITAMIN D3) 25 MCG Tab Take  by mouth.     • cetirizine (ZYRTEC) 10 MG Tab Take 10 mg by mouth every morning.       No current [facility]-ordered facility-administered medications on file.       Past Medical History:   Diagnosis Date   • Allergic rhinitis    • Arthritis     Hands   • ASTHMA    • Atrial fibrillation (HCC) January 2015    New onset. Echocardiogram with normal LV size, mild concentric LVH, LVEF 65-70%. Normal RA and LA. Mild MR, mild TR, RVSP 35-40mmHg. June 2015: GI bleed, Coumadin stopped/contraindicated.   • B12 deficiency    • Cancer (HCC)     Vulvar; skin; thyroid   • CHI (closed head injury)    • COPD (chronic obstructive pulmonary disease) (HCC)    • Degenerative joint disease    • Dental disorder     One broken tooth   • Depression    • GERD (gastroesophageal reflux disease)    • Hemorrhoids    • [place] (hard of hearing)      Bilaterally   • HSV-1 infection    • Hypertension    • Hypothyroidism    • MEDICAL HOME    • Meningitis    • Osteoporosis    • Shingles    • Sinusitis due to Moraxella catarrhalis    • Stress incontinence    • Thyroid cancer (HCC)     benign   • Vulvar cancer (HCC)        Social History     Tobacco Use   • Smoking status: Passive Smoke  "Exposure - Never Smoker   • Smokeless tobacco: Never Used   Vaping Use   • Vaping Use: Never used   Substance Use Topics   • Alcohol use: Yes     Alcohol/week: 0.6 oz     Types: 1 Standard drinks or equivalent per week     Comment: rarely   • Drug use: No       Allergies:  Ace inhibitors and Albuterol    Health Maintenance: Completed    ROS:  Per HPI  No cp/ha/sob/abd pain    Objective:       Exam:  /70 (BP Location: Left arm, Patient Position: Sitting, BP Cuff Size: Adult)   Pulse 77   Temp 36.8 °C (98.3 °F) (Temporal)   Resp 16   Ht 1.499 m (4' 11\")   Wt 57.6 kg (127 lb)   LMP  (LMP Unknown)   SpO2 97%   BMI 25.65 kg/m²   Body mass index is 25.65 kg/m².  Wt Readings from Last 4 Encounters:   06/29/21 57.6 kg (127 lb)   06/07/21 55.3 kg (122 lb)   05/13/21 56.8 kg (125 lb 4.8 oz)   04/28/21 55.7 kg (122 lb 14.4 oz)       Gen: Alert and oriented, No apparent distress. Appropriately groomed.  Neck: Neck is supple without lymphadenopathy.No thyromegaly.   Lungs: Normal effort, CTA bilaterally, no wheezes, rhonchi, or rales.  CV: Regular rate and rhythm. No murmurs, rubs, or gallops.  ABD:  Soft, nontender, nondistended, NABSx4, no HSM or RBT or guarding or masses.  Ext: No clubbing, cyanosis, edema.  Skin: No rash noted.          Assessment & Plan:     86 y.o. female with the following -   Reviewed with the patient that she most likely has mild cognitive impairment and we reviewed things she can do to prevent worsening of her memory.  She would prefer to stay on the Ditropan as it does help with her bladder control.  She will meet with cardiology to review the Zio patch and is open to considering anticoagulation.  1. Memory problem    2. Mixed incontinence urge and stress        Return in about 3 months (around 9/29/2021) for med review.    Please note that this dictation was created using voice recognition software. I have made every reasonable attempt to correct obvious errors, but I expect that there " are errors of grammar and possibly content that I did not discover before finalizing the note.

## 2021-06-30 NOTE — PROGRESS NOTES
"Arrhythmia Clinic Note (New Patient)    DOS: 6/30/2021    Referring physician: Lillie Pleitez MD    Chief complaint/Reason for consult: PAF/AFL    HPI:  Patient is an 85 yo F. History of AF, COPD, HTN, cognitive impairment. Comes in with her daughter. Recently underwent zio patch testing. Tells me this was done for history of \"AF\" and no symptoms in particular. Zio showing 33% AF burden. Rate is poorly controlled along with post conversion pauses up to ~4 seconds. She denies syncope or pre-syncopal symptoms or any symptoms of palpitations/HF. She had been on coumadin in the past but did not tolerate. Presents to EP for abnormal zio.    ROS (+in BOLD):  Constitutional: Fevers/chills/fatigue/weightloss  HEENT: Blurry vision/eye pain/sore throat/hearing loss  Respiratory: Shortness of breath/cough  Cardiovascular: Chest pain/palpitations/edema/orthopnea/syncope  GI: Nausea/vomitting/diarrhea  MSK: Arthralgias/myagias/muscle weakness  Skin: Rash/sores  Neurological: Numbness/tremors/vertigo  Endocrine: Excessive thirst/polyuria/cold intolerance/heat intolerance  Psych: Depression/anxiety    Past Medical History:   Diagnosis Date   • Allergic rhinitis    • Arthritis     Hands   • ASTHMA    • Atrial fibrillation (HCC) January 2015    New onset. Echocardiogram with normal LV size, mild concentric LVH, LVEF 65-70%. Normal RA and LA. Mild MR, mild TR, RVSP 35-40mmHg. June 2015: GI bleed, Coumadin stopped/contraindicated.   • B12 deficiency    • Cancer (HCC)     Vulvar; skin; thyroid   • CHI (closed head injury)    • COPD (chronic obstructive pulmonary disease) (HCC)    • Degenerative joint disease    • Dental disorder     One broken tooth   • Depression    • GERD (gastroesophageal reflux disease)    • Hemorrhoids    • [place] (hard of hearing)      Bilaterally   • HSV-1 infection    • Hypertension    • Hypothyroidism    • MEDICAL HOME    • Meningitis    • Osteoporosis    • Shingles    • Sinusitis due to Moraxella catarrhalis  "   • Stress incontinence    • Thyroid cancer (HCC)     benign   • Vulvar cancer (HCC)        Past Surgical History:   Procedure Laterality Date   • GASTROSCOPY WITH BIOPSY N/A 2015    Procedure: GASTROSCOPY WITH BIOPSY;  Surgeon: Tarvis Jones M.D.;  Location: [facility];  Service:    • COLONOSCOPY WITH BIOPSY N/A 2015    Procedure: COLONOSCOPY WITH BIOPSY;  Surgeon: Travis Jones M.D.;  Location: [facility];  Service:    • HYSTERECTOMY ROBOTIC  2013    Performed by Zoltan Salazar M.D. at [facility]ERY [facility]   • CYSTOSCOPY STENT PLACEMENT  2013    Performed by Zoltan Salazar M.D. at SUR[facility]ERY [facility]   • OTHER       Removal Rt sinus osteoma   • OTHER SURGICAL PROCEDURE      ORx3 cancer removal (vulvar Ca.)   • THYROIDECTOMY       Due to thyroid cancern   • TONSILLECTOMY         Social History     Socioeconomic History   • Marital status:      Spouse name: Not on file   • Number of children: Not on file   • Years of education: Not on file   • Highest education level: Not on file   Occupational History   • Not on file   Tobacco Use   • Smoking status: Passive Smoke Exposure - Never Smoker   • Smokeless tobacco: Never Used   Vaping Use   • Vaping Use: Never used   Substance and Sexual Activity   • Alcohol use: Yes     Alcohol/week: 0.6 oz     Types: 1 Standard drinks or equivalent per week     Comment: rarely   • Drug use: No   • Sexual activity: Never   Other Topics Concern   • Not on file   Social History Narrative    Has three children, raised them in [place]    She was  from her 2nd  at age 50 (he had been injured in the [place] War, and the treatment he had made things worse, he  at 55).     Moved to [place] age 71 bc 2 of her 3 kids are here.     She lives alone in a 1 story home, her kids are always bringing her leftovers, someone takes her shopping (stopped driving age 84 bc her kids said she swerved, had a bad fall in her  40s (down stairs outside, head first while holding the rail, and ever since has walked a little off, has had 3 surgeries, took 2y to find a tumor, was having spasms and other neurological issues, still occurs if you look in her ears, affected by air pressure, had the tumor removed, in her sinus cavity, had to return for further resection. Was benign but resected bc of effect on balance. Bc it took a long time to discover it some effects were permanent. Struggles to bend over bc gets vertigo.    This mostly affects her ability to bend over and hug her 24 grandchildren.    Also has someone come houseclean. Daughter helps her some w/ managing her finances. Daughter Etta Ferrer has POA.      Social Determinants of Health     Financial Resource Strain:    • Difficulty of Paying Living Expenses:    Food Insecurity:    • Worried About Running Out of Food in the Last Year:    • Ran Out of Food in the Last Year:    Transportation Needs:    • Lack of Transportation (Medical):    • Lack of Transportation (Non-Medical):    Physical Activity:    • Days of Exercise per Week:    • Minutes of Exercise per Session:    Stress:    • Feeling of Stress :    Social Connections:    • Frequency of Communication with Friends and Family:    • Frequency of Social Gatherings with Friends and Family:    • Attends Jain Services:    • Active Member of Clubs or Organizations:    • Attends Club or Organization Meetings:    • Marital Status:    Intimate Partner Violence:    • Fear of Current or Ex-Partner:    • Emotionally Abused:    • Physically Abused:    • Sexually Abused:        Family History   Problem Relation Age of Onset   • Heart Disease Mother         cva.tia   • Cancer Father         throat Cancer   • Heart Disease Sister    • Cancer Brother         brain tumor   • Cancer Sister         breast   • Cancer Sister         lung   • Other Sister         myeloma   • Hyperlipidemia Daughter    • Heart Disease Son        Allergies   Allergen  "Reactions   • Ace Inhibitors      Cough     • Albuterol      Whole body spasmed  Patient tolerates Formoterol (Symbicort) - takes at home       Current Outpatient Medications   Medication Sig Dispense Refill   • dronedarone (MULTAQ) 400 MG Tab Take 1 tablet by mouth 2 times a day with meals. 60 tablet 5   • apixaban (ELIQUIS) 2.5mg Tab Take 1 tablet by mouth 2 times a day. 60 tablet 5   • famotidine (PEPCID) 40 MG Tab Take 40 mg by mouth.     • PARoxetine (PAXIL) 20 MG Tab Take 1 tablet by mouth every day. 100 tablet 3   • fluticasone (FLONASE) 50 MCG/ACT nasal spray Administer 1 Spray into affected nostril(S) every day. 16 g 11   • levothyroxine (SYNTHROID) 75 MCG Tab Take 1 tablet by mouth every morning on an empty stomach. 90 tablet 1   • vitamin D, Ergocalciferol, (DRISDOL) 1.25 MG (40992 UT) Cap capsule Take 1 capsule by mouth every 7 days. 12 capsule 1   • albuterol 108 (90 Base) MCG/ACT Aero Soln inhalation aerosol Inhale 2 Puffs every four hours as needed for Shortness of Breath. 1 Each 0   • oxybutynin SR (DITROPAN-XL) 5 MG TABLET SR 24 HR Take 1 Tab by mouth every day. 90 Tab 3   • Cholecalciferol (VITAMIN D3) 25 MCG Tab Take  by mouth.     • cetirizine (ZYRTEC) 10 MG Tab Take 10 mg by mouth every morning.       No current facility-administered medications for this visit.       Physical Exam:  Vitals:    06/30/21 1102   BP: 118/74   BP Location: Left arm   Patient Position: Sitting   BP Cuff Size: Adult   Pulse: 66   Resp: 18   SpO2: 95%   Weight: 55.8 kg (123 lb)   Height: 1.499 m (4' 11\")     General appearance: NAD, conversant  Eyes: anicteric sclerae, no lid-lag; PERRLA  HENT: Atraumatic; moist mucous membranes, no ulcerations  Neck: Trachea midline; FROM, supple, no thyromegaly  Lungs: CTA, with normal respiratory effort and no intercostal retractions  CV: RRR, no MRGs, no JVD  Abdomen: Soft, non-tender; normal bowel sounds, no HSM  Extremities: No peripheral edema. No clubbing or cyanosis.  Skin: " Normal temperature, turgor and texture; no rash or ulcers  Psych: Appropriate affect, alert and oriented to person, place and time      Data:  Labs reviewed    EKG interpreted by me:  MARIO Mcneal reviewed    Prior echo showing preserved function    Impression/Plan:  1. PAF (paroxysmal atrial fibrillation) (HCC)  EKG   2. Paroxysmal atrial fibrillation (HCC)  EC-ECHOCARDIOGRAM COMPLETE W/ CONT     -CHADSVasc of at least 4 will start oral anticoagulation with Eliquis for stroke prevention  -Echo to r/o structural heart disease  -Multaq trial to reduce AF burden  -F/u in 6-8 weeks    Rogelio Bragg MD

## 2021-07-09 NOTE — PROGRESS NOTES
Rossana Aguilar is a 86 y.o. female here for a non-provider visit for Blood Pressure Check    Vitals:    07/09/21 1103 07/09/21 1107   BP: 130/60 130/58   BP Location: Left arm Right arm   Patient Position: Sitting Sitting   BP Cuff Size: Adult Adult     If abnormal, was the Registered Nurse (office provider if RN is unavailable) notified today? No    Routed to PCP? Yes

## 2021-07-25 NOTE — ED NOTES
This pt moved from the bed to the wheelchair and then into a high truck, her movement was excellent requiring little assistance. All follow-up gone over verbally, released.

## 2021-07-25 NOTE — ED PROVIDER NOTES
ED Provider Note  CHIEF COMPLAINT  Chief Complaint   Patient presents with   • GLF   • Hip Pain       HPI  Tania Aguilar is a 86 y.o. female who presents after a fall.  The history is a bit confusing, she is not a good historian.  Sounds like she told paramedics and nursing that she did not remember what happened.  It occurred sometime last night.  She states she thinks she went to the bathroom and then lost her balance when she was standing up and fell back on the toilet.  It is unclear how she made it back to bed but it sounds like she was able to ambulate on the hip after the fall.  She told paramedics she was having hip pain but when I entered the room she says she thinks she has injured her back.  She has no numbness or tingling in her arms or legs.  Is unclear if she hit her head.  She denies any headache.  She is on Eliquis for atrial fibrillation.  There is no nausea or vomiting.  There is no chest pain or shortness of breath.  Her son called the ambulance after she told her friend she was not able to make it to [facility] because of her fall.  This point is on clear if she fell or had a syncopal episode.  Most of the hip pain is located in the posterior buttock area.  No neck pain.    REVIEW OF SYSTEMS  See HPI for further details. All other systems are negative.     PAST MEDICAL HISTORY  Past Medical History:   Diagnosis Date   • Atrial fibrillation (HCC) January 2015    New onset. Echocardiogram with normal LV size, mild concentric LVH, LVEF 65-70%. Normal RA and LA. Mild MR, mild TR, RVSP 35-40mmHg. June 2015: GI bleed, Coumadin stopped/contraindicated.   • Allergic rhinitis    • Arthritis     Hands   • ASTHMA    • B12 deficiency    • Cancer (HCC)     Vulvar; skin; thyroid   • CHI (closed head injury)    • COPD (chronic obstructive pulmonary disease) ([facility])    • Degenerative joint disease    • Dental disorder     One broken tooth   • Depression    • GERD (gastroesophageal reflux disease)    •  Hemorrhoids    • [place] (hard of hearing)      Bilaterally   • HSV-1 infection    • Hypertension    • Hypothyroidism    • MEDICAL HOME    • Meningitis    • Osteoporosis    • Shingles    • Sinusitis due to Moraxella catarrhalis    • Stress incontinence    • Thyroid cancer (HCC)     benign   • Vulvar cancer (HCC)        FAMILY HISTORY  [unfilled]    SOCIAL HISTORY  Social History     Socioeconomic History   • Marital status:      Spouse name: Not on file   • Number of children: Not on file   • Years of education: Not on file   • Highest education level: Not on file   Occupational History   • Not on file   Tobacco Use   • Smoking status: Passive Smoke Exposure - Never Smoker   • Smokeless tobacco: Never Used   Vaping Use   • Vaping Use: Never used   Substance and Sexual Activity   • Alcohol use: Yes     Alcohol/week: 0.6 oz     Types: 1 Standard drinks or equivalent per week     Comment: rarely   • Drug use: No   • Sexual activity: Never   Other Topics Concern   • Not on file   Social History Narrative    Has three children, raised them in [place]    She was  from her 2nd  at age 50 (he had been injured in the [place] War, and the treatment he had made things worse, he  at 54).     Moved to [place] age 71 bc 2 of her 3 kids are here.     She lives alone in a 1 story home, her kids are always bringing her leftovers, someone takes her shopping (stopped driving age 84 bc her kids said she swerved, had a bad fall in her 40s (down stairs outside, head first while holding the rail, and ever since has walked a little off, has had 3 surgeries, took 2y to find a tumor, was having spasms and other neurological issues, still occurs if you look in her ears, affected by air pressure, had the tumor removed, in her sinus cavity, had to return for further resection. Was benign but resected bc of effect on balance. Bc it took a long time to discover it some effects were permanent. Struggles to bend over bc gets  vertigo.    This mostly affects her ability to bend over and hug her 24 grandchildren.    Also has someone come houseclean. Daughter helps her some w/ managing her finances. Daughter Etta Ferrer has POA.      Social Determinants of Health     Financial Resource Strain:    • Difficulty of Paying Living Expenses:    Food Insecurity:    • Worried About Running Out of Food in the Last Year:    • Ran Out of Food in the Last Year:    Transportation Needs:    • Lack of Transportation (Medical):    • Lack of Transportation (Non-Medical):    Physical Activity:    • Days of Exercise per Week:    • Minutes of Exercise per Session:    Stress:    • Feeling of Stress :    Social Connections:    • Frequency of Communication with Friends and Family:    • Frequency of Social Gatherings with Friends and Family:    • Attends Mormon Services:    • Active Member of Clubs or Organizations:    • Attends Club or Organization Meetings:    • Marital Status:    Intimate Partner Violence:    • Fear of Current or Ex-Partner:    • Emotionally Abused:    • Physically Abused:    • Sexually Abused:        SURGICAL HISTORY  Past Surgical History:   Procedure Laterality Date   • GASTROSCOPY WITH BIOPSY N/A 5/7/2015    Procedure: GASTROSCOPY WITH BIOPSY;  Surgeon: Travis Jones M.D.;  Location: [facility];  Service:    • COLONOSCOPY WITH BIOPSY N/A 5/7/2015    Procedure: COLONOSCOPY WITH BIOPSY;  Surgeon: Travis Jones M.D.;  Location: [facility];  Service:    • HYSTERECTOMY ROBOTIC  12/27/2013    Performed by Zoltan Salazar M.D. at [facility]   • CYSTOSCOPY STENT PLACEMENT  12/27/2013    Performed by Zoltan Salazar M.D. at [facility]   • OTHER       Removal Rt sinus osteoma   • OTHER SURGICAL PROCEDURE      ORx3 cancer removal (vulvar Ca.)   • THYROIDECTOMY       Due to thyroid cancern   • TONSILLECTOMY         CURRENT MEDICATIONS   Home Medications    **Home medications have not yet  been reviewed for this encounter**         ALLERGIES  Allergies   Allergen Reactions   • Ace Inhibitors      Cough     • Albuterol      Whole body spasmed  Patient tolerates Formoterol (Symbicort) - takes at home       PHYSICAL EXAM  VITAL SIGNS: BP (!) 203/84   Pulse 62   Temp 36.7 °C (98 °F)   Resp 16   Wt 56 kg (123 lb 7.3 oz)   LMP  (LMP Unknown)   SpO2 91%   BMI 24.94 kg/m²       Constitutional: Well developed, No acute distress, Non-toxic appearance.   HENT: Normocephalic, Atraumatic, Bilateral external ears normal, Nose normal.   Eyes: PERRL, EOMI, Conjunctiva normal, No discharge.   Neck: Normal range of motion, No cervical midline tenderness, Supple  Cardiovascular: Normal heart rate, Normal rhythm  Thorax & Lungs: Normal breath sounds, No respiratory distress,No chest tenderness.   Abdomen: Benign abdominal exam, no guarding no rebound, no pulsatile mass, no tenderness, no distention  Skin: Warm, Dry, No erythema, No abrasions or contusions noted.  Back: No midline tenderness, no tenderness in the left paraspinal lower lumbar area to palpation, deformity palpated no CVA tenderness.   Extremities: Intact distal pulses, some minimal tenderness in the left lateral hip area.  Normal flexion of the hip without pain.  No other deformities noted on extremity exam.  No numbness or tingling.  +2 DP pulse.  Neurologic: Alert & oriented x 3, Normal motor function, Normal sensory function, No focal deficits noted.   Psychiatric: appropriate    Labs  Results for orders placed or performed during the hospital encounter of 07/25/21   CBC WITH DIFFERENTIAL   Result Value Ref Range    WBC 6.5 4.8 - 10.8 K/uL    RBC 4.54 4.20 - 5.40 M/uL    Hemoglobin 14.0 12.0 - 16.0 g/dL    Hematocrit 41.5 37.0 - 47.0 %    MCV 91.4 81.4 - 97.8 fL    MCH 30.8 27.0 - 33.0 pg    MCHC 33.7 33.6 - 35.0 g/dL    RDW 44.7 35.9 - 50.0 fL    Platelet Count 306 164 - 446 K/uL    MPV 9.5 9.0 - 12.9 fL    Neutrophils-Polys 67.20 44.00 - 72.00 %     Lymphocytes 24.70 22.00 - 41.00 %    Monocytes 5.40 0.00 - 13.40 %    Eosinophils 1.40 0.00 - 6.90 %    Basophils 0.80 0.00 - 1.80 %    Immature Granulocytes 0.50 0.00 - 0.90 %    Nucleated RBC 0.00 /100 WBC    Neutrophils (Absolute) 4.39 2.00 - 7.15 K/uL    Lymphs (Absolute) 1.61 1.00 - 4.80 K/uL    Monos (Absolute) 0.35 0.00 - 0.85 K/uL    Eos (Absolute) 0.09 0.00 - 0.51 K/uL    Baso (Absolute) 0.05 0.00 - 0.12 K/uL    Immature Granulocytes (abs) 0.03 0.00 - 0.11 K/uL    NRBC (Absolute) 0.00 K/uL   TROPONIN   Result Value Ref Range    Troponin T <6 6 - 19 ng/L   EKG   Result Value Ref Range    Report       [facility]    Test Date:  2021  Pt Name:    PABLO RICH                Department: [facility]  MRN:        [id number]                      [facility]  Gender:     Female                       Technician: [id number]  :        [id number]                   Requested By:YESY FRYE  Order #:    [id number]                    Reading MD: Yesy Altman    Measurements  Intervals                                Axis  Rate:       72                           P:          76  PA:         193                          QRS:        68  QRSD:       80                           T:          59  QT:         392  QTc:        430    Interpretive Statements  Sinus rhythm  Anteroseptal infarct, old  Compared to ECG 2021 12:05:31  No significant changes  Electronically Signed On 2021 10:02:29 PDT by Yesy Altman         RADIOLOGY/PROCEDURES  CT-HIP W/O PLUS RECONS LEFT   Final Result      1.  No LEFT hip or pelvic fracture.   2.  Trace pelvic fluid, nonspecific.      CT-LSPINE W/O PLUS RECONS   Final Result      1.  No lumbar spine fracture or subluxation.   2.  Moderate to severe multilevel degenerative changes.   3.  Chronic severe compression of T12.      CT-HEAD W/O   Final Result      1.  Moderate atrophy and diffuse white matter changes.   2.  No acute  intracranial hemorrhage or territorial infarct.         DX-CHEST-PORTABLE (1 VIEW)   Final Result      No acute cardiopulmonary disease.          COURSE & MEDICAL DECISION MAKING  Pertinent Labs & Imaging studies reviewed. (See chart for details)  Patient presents emergency department after a fall.  Is unclear by history if this was a syncopal episode versus losing her balance while getting off the toilet.  She told paramedics she did remember the event but she states after thinking about it she thinks she lost her balance getting off the toilet.  She took paramedics she was not sure if she hit her head or not.  Due to her confusion about the event and her being on Eliquis I will obtain a CT of the head.  She was complaining of left hip pain in the ambulance.  To me she complains of more left sided back pain/buttock pain.  I am able to fully range her hip without difficulty or pain.  She is neurovascularly intact distally.  Therefore I will obtain a CT of her low back and hip to further evaluate her symptoms.  We will also add basic labs and EKG due to the possibility of syncope.  Patient is alert and oriented x3.  She does seems to have some memory issues which I suspect is likely chronic.  She is not hypoxic any respiratory distress.  She is not febrile.  Pressure is slightly elevated here.    On recheck family is in the room.  Sounds like her memory about the event is returning and it sounds like she did have a fall in the bathroom and it was not a syncopal event.  Her EKG looks normal.  Troponin was normal.  She has a history of A. fib but is not in A. fib currently.  Glucose is 127 without a gap acidosis.  No evidence of anemia.  Head CT was unremarkable.  Fortunately CT of the hip and low back also shows no evidence of fracture.  Patient overall is feeling better and ready to go home.  Family feels like they can take care of her.  She does have a walker at home that she supposed to use and I encouraged her to  use that especially when she is in the bathroom.  If she has recurrent falls, persistent leg pain or any other concerns return.  FINAL IMPRESSION     1. Fall, initial encounter    2. Contusion of left hip, initial encounter    3. Acute left-sided low back pain without sciatica    4. Closed head injury, initial encounter             Electronically signed by: Yesy Reid M.D., 7/25/2021 9:31 AM

## 2021-07-25 NOTE — ED TRIAGE NOTES
Pt to ed by [facility].  Pt had glf last night. Does not recall incident.  C/o l hip pain, non tender at this time.  Pt hit head , on eliquis .  Denies headache

## 2021-07-25 NOTE — ED NOTES
Med rec updated and complete  Allergies reviewed  Pt had a list of medications at bedside, went over list of medications and returned list of medications back to pt at bedside.  Pt has never took MULTAQ 400MG  Pt reports that  she has not received her TYMLOS injection since 4/1/2021  Pt reports no antibiotics in the last 30 days.      No current facility-administered medications on file prior to encounter.     Current Outpatient Medications on File Prior to Encounter   Medication Sig Dispense Refill   • fluticasone (FLONASE) 50 MCG/ACT nasal spray Administer 1 Spray into affected nostril(S) as needed. Indications: Signs and Symptoms of Nose Diseases     • Multiple Vitamins-Minerals (OCUVITE-LUTEIN) Tab Take 1 tablet by mouth every day.     • TURMERIC PO Take 500 mg by mouth every day.     • Non Formulary Request Take 1 tablet by mouth every day. Brain/Memory w huperzine  (OTC)     • vitamin D, Ergocalciferol, (DRISDOL) 1.25 MG (55463 UT) Cap capsule Take 50,000 Units by mouth every 7 days. On Tue     • acetaminophen (TYLENOL) 650 MG CR tablet Take 650 mg by mouth every 6 hours as needed for Moderate Pain.     • guaiFENesin (MUCINEX PO) Take 20 mL by mouth as needed (For cough).     • Cetirizine HCl (KLS ALLER-JERAMY PO) Take 1 tablet by mouth as needed (For allergies). For allergies     • Non Formulary Request Take 1 capsule by mouth every evening. Bone Nutrient (Doterra)     • Non Formulary Request Take 1 capsule by mouth every evening. (Doterra) EO Chris Essential oil Omega     • Non Formulary Request Take 1 capsule by mouth every evening. (Doterra) Alpha CRS and Cellular Vitality     • Non Formulary Request Take 1 capsule by mouth every evening. (Doterra) Micro Plex VMz Food Nutrient     • apixaban (ELIQUIS) 2.5mg Tab Take 1 tablet by mouth 2 times a day. 200 tablet 2   • PARoxetine (PAXIL) 20 MG Tab Take 1 tablet by mouth every day. 100 tablet 3   • levothyroxine (SYNTHROID) 75 MCG Tab Take 1 tablet by mouth every  morning on an empty stomach. 90 tablet 1   • albuterol 108 (90 Base) MCG/ACT Aero Soln inhalation aerosol Inhale 2 Puffs every four hours as needed for Shortness of Breath. 1 Each 0   • oxybutynin SR (DITROPAN-XL) 5 MG TABLET SR 24 HR Take 1 Tab by mouth every day. 90 Tab 3   • Cholecalciferol (VITAMIN D3) 25 MCG Tab Take 25 mcg by mouth 2 times a day.

## 2021-07-25 NOTE — ED NOTES
BIB by [facility], pt is alert. She complains of left hip pain with minimal complaint on pelvic rock. Warm blankets provided, call light on her lap. ERP at the bedside.

## 2021-07-25 NOTE — DISCHARGE INSTRUCTIONS
Fortunately does not appear that you broke any bones in your back or hip with a fall.  Your CAT scan of your brain also looks good without evidence of bleeding.  Your laboratory studies do not show any significant abnormalities.  Go home and rest.  Ice her sore area and take Tylenol for pain.  You need to use your walker when trying to get around.  Any repeat falls, worsening pain numbness or weakness or concerns return.  I hope you feel better soon.

## 2021-07-26 NOTE — TELEPHONE ENCOUNTER
ESTABLISHED PATIENT PRE-VISIT PLANNING     Patient was NOT contacted to complete PVP.     Note: Patient will not be contacted if there is no indication to call.     1.  Reviewed notes from the last few office visits within the medical group: Yes    2.  If any orders were placed at last visit or intended to be done for this visit (i.e. 6 mos follow-up), do we have Results/Consult Notes?         •  Labs - Labs were not ordered at last office visit.  Note: If patient appointment is for lab review and patient did not complete labs, check with provider if OK to reschedule patient until labs completed.       •  Imaging - Imaging was not ordered at last office visit.       •  Referrals - No referrals were ordered at last office visit.    3. Is this appointment scheduled as a Hospital Follow-Up? No    4.  Immunizations were updated in Epic using Reconcile Outside Information activity? Yes    5.  Patient is due for the following Health Maintenance Topics:   Health Maintenance Due   Topic Date Due   • Annual Pulmonary Function Test / Spirometry  05/17/2017     6.  AHA (Pulse8) form printed for Provider? Email sent to [facility] requesting form

## 2021-07-27 PROBLEM — F01.50 VASCULAR DEMENTIA WITHOUT BEHAVIORAL DISTURBANCE (HCC): Status: ACTIVE | Noted: 2021-01-01

## 2021-07-27 NOTE — PROGRESS NOTES
Pt was seated, confirmed pt name and , procedure explained to pt, venipuncture performed in LAC. using aseptic technique, 1 gold tube collected, gauze placed with pressure on venipuncture site until hemostasis observed, site clean and dry, no redness or swelling observed, bandage placed, pt tolerated well and voiced no concerns.

## 2021-07-27 NOTE — PROGRESS NOTES
Subjective:     CC:   Chief Complaint   Patient presents with   • Fall     crawled back to bed, in a lot of pain, worried about breaking, cut on lt side of eye   • Memory Loss     cannot remember the details of fall   • Low Back Pain     lt side, hurts to move   • Rib Injury     possible, red pain         HPI:   Tania presents today to follow-up from her July 25 ER visit regarding a fall the night prior.  Apparently she fell at night going to the bathroom perhaps losing her balance.  She had told EMTs that her hip was hurting her but she told the ER doctor that her lower back was the problem.  She is on Eliquis for her A. fib.  Her son had called EMS after she told her friend she could not make it to [facility] because of the fall.  Her blood pressure in the emergency room was 203/84.  She had a CT of her hip that showed chronic severe compression of T12 and moderate to severe multilevel degenerative changes but was otherwise negative.  She had a negative head CT other than moderate atrophy.  She also had an EKG and blood work done because of the possibility of syncope.  She also saw her cardiologist June 30, she had a recent Zio patch which showed 33% AF burden, her rate was poorly controlled along with postconversion pauses up to 4 seconds.  She had not tolerated Coumadin in the past, she was agreeable to start Eliquis at that time.  She is due to follow-up with Dr. Bragg later this summer.  She has a pending echocardiogram.    She and I last discussed her mild cognitive impairment at her last visit with me June 29.  She has deferred neuropsych testing though does struggle with word finding.  Her Mini-Mental status exam and May was 26 out of 30, her brain MRI showed advanced chronic microvascular ischemic changes and mild age-related volume loss.    She lives her own individual little one story house, 2 bedroom with DR SHIELA and kitchen [place] Has looked into a fall alert but hasn't gotten one  "yet.   Here w/ dtr in law today Dylon who states over the past 1-2 months there is a rapid steady decline in memory function, word retrieval and unsteady on her feet, even her walker doesn't seem as safe bc doesn't seem to remember how to hold onto it.    She blames this decline on the eliquis and wants to stop it.  Tends to fall, per Dylon \"bounces off the walls.\" is an issue with her equilibrium.   4/2 TSH < 0.005, on 75mcg synthroid down from 88.   Doesn't check bp.   Never started multaq bc of cost ($166/m), eliquis is $132/m.   6/21 brain MRI showed Mild generalized volume loss. No disproportionate hippocampal or temporal lobe volume loss is seen, advanced chronic microvascular ischemic type changes.   Using tylenol for persistent pain in L hip - helps.   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   Unstable, add PT and continue to monitor.          Current Outpatient Medications Ordered in Epic   Medication Sig Dispense Refill   • fluticasone (FLONASE) 50 MCG/ACT nasal spray Administer 1 Spray into affected nostril(S) as needed. Indications: Signs and Symptoms of Nose Diseases     • Multiple Vitamins-Minerals (OCUVITE-LUTEIN) Tab Take 1 tablet by mouth every day.     • TURMERIC PO Take 500 mg by mouth every day.     • Non Formulary Request Take 1 tablet by mouth every day. Brain/Memory w huperzine  (OTC)     • vitamin D, Ergocalciferol, (DRISDOL) 1.25 MG (45133 UT) Cap capsule Take 50,000 Units by mouth every 7 days. On Tue     • acetaminophen (TYLENOL) 650 MG CR tablet Take 650 mg by mouth every 6 hours as needed for Moderate Pain.     • guaiFENesin (MUCINEX PO) Take 20 mL by mouth as needed (For cough).     • Cetirizine HCl (KLS ALLER-JERAMY PO) Take 1 tablet by mouth as needed (For allergies). For allergies     • Non Formulary Request Take 1 capsule by mouth every evening. Bone Nutrient (Doterra)     • Non Formulary " Request Take 1 capsule by mouth every evening. (Doterra) EO Chris Essential oil Omega     • Non Formulary Request Take 1 capsule by mouth every evening. (Doterra) Alpha CRS and Cellular Vitality     • Non Formulary Request Take 1 capsule by mouth every evening. (Doterra) Micro Plex VMz Food Nutrient     • apixaban (ELIQUIS) 2.5mg Tab Take 1 tablet by mouth 2 times a day. 200 tablet 2   • PARoxetine (PAXIL) 20 MG Tab Take 1 tablet by mouth every day. 100 tablet 3   • levothyroxine (SYNTHROID) 75 MCG Tab Take 1 tablet by mouth every morning on an empty stomach. 90 tablet 1   • oxybutynin SR (DITROPAN-XL) 5 MG TABLET SR 24 HR Take 1 Tab by mouth every day. 90 Tab 3   • Cholecalciferol (VITAMIN D3) 25 MCG Tab Take 25 mcg by mouth 2 times a day.       No current [facility]-ordered facility-administered medications on file.       Past Medical History:   Diagnosis Date   • Allergic rhinitis    • Arthritis     Hands   • ASTHMA    • Atrial fibrillation (HCC) January 2015    New onset. Echocardiogram with normal LV size, mild concentric LVH, LVEF 65-70%. Normal RA and LA. Mild MR, mild TR, RVSP 35-40mmHg. June 2015: GI bleed, Coumadin stopped/contraindicated.   • B12 deficiency    • Cancer (HCC)     Vulvar; skin; thyroid   • CHI (closed head injury)    • COPD (chronic obstructive pulmonary disease) (HCC)    • Degenerative joint disease    • Dental disorder     One broken tooth   • Depression    • GERD (gastroesophageal reflux disease)    • Hemorrhoids    • [place] (hard of hearing)      Bilaterally   • HSV-1 infection    • Hypertension    • Hypothyroidism    • MEDICAL HOME    • Meningitis    • Osteoporosis    • Shingles    • Sinusitis due to Moraxella catarrhalis    • Stress incontinence    • Thyroid cancer (HCC)     benign   • Vulvar cancer (HCC)        Social History     Tobacco Use   • Smoking status: Passive Smoke Exposure - Never Smoker   • Smokeless tobacco: Never Used   Vaping Use   • Vaping Use: Never used   Substance Use Topics  "  • Alcohol use: Not Currently     Alcohol/week: 0.6 oz     Types: 1 Standard drinks or equivalent per week     Comment: rarely   • Drug use: No       Allergies:  Ace inhibitors and Albuterol    Health Maintenance: Completed    ROS:  Per HPI      Objective:       Exam:  /74 (BP Location: Left arm, Patient Position: Sitting, BP Cuff Size: Adult)   Pulse 77   Temp 36.8 °C (98.2 °F) (Temporal)   Resp 16   Ht 1.499 m (4' 11\")   Wt 56.1 kg (123 lb 9.6 oz)   LMP  (LMP Unknown)   SpO2 97%   BMI 24.96 kg/m²   Body mass index is 24.96 kg/m².  Wt Readings from Last 4 Encounters:   07/28/21 55.8 kg (123 lb)   07/27/21 56.1 kg (123 lb 9.6 oz)   07/25/21 56 kg (123 lb 7.3 oz)   06/30/21 55.8 kg (123 lb)       Gen: Alert and oriented, No apparent distress. Appropriately groomed.  Neck: Neck is supple without lymphadenopathy.No thyromegaly.   Lungs: Normal effort, CTA bilaterally, no wheezes, rhonchi, or rales.  CV: irRegular  rhythm. No murmurs, rubs, or gallops.  ABD:  Soft, nontender, nondistended, NABSx4, no HSM or RBT or guarding or masses.  Ext: No clubbing, cyanosis, edema.  Skin: No rash noted.    MOCA 14/30      Assessment & Plan:     86 y.o. female with the following -     1. Essential hypertension, benign    2. Acquired hypothyroidism  - TSH WITH REFLEX TO FT4; Future    3. Vascular dementia without behavioral disturbance (HCC)  - REFERRAL TO HOME HEALTH    4. Gait instability  - REFERRAL TO HOME HEALTH        I spent a total of 53 minutes with record review, exam, communication with the patient, communication with other providers, and documentation of this encounter.      Return in about 5 weeks (around 8/31/2021).    Please note that this dictation was created using voice recognition software. I have made every reasonable attempt to correct obvious errors, but I expect that there are errors of grammar and possibly content that I did not discover before finalizing the note.      "

## 2021-07-29 NOTE — PROGRESS NOTES
Medication chart review for [facility] services    PCP:  Lillie Pleitez M.D.  [address] [address]  [place] [phone]  Fax: [phone]    Current medication list     Current Outpatient Medications:   •  fluticasone, 1 Spray, Nasal, PRN  •  Ocuvite-Lutein, 1 tablet, Oral, DAILY  •  TURMERIC PO, 500 mg, Oral, DAILY  •  Non Formulary Request, 1 tablet, Oral, DAILY  •  vitamin D (Ergocalciferol), 50,000 Units, Oral, Q7 DAYS  •  acetaminophen, 650 mg, Oral, Q6HRS PRN  •  guaiFENesin (MUCINEX PO), 20 mL, Oral, PRN  •  Cetirizine HCl (KLS ALLER-JERAMY PO), 1 tablet, Oral, PRN  •  Non Formulary Request, 1 capsule, Oral, Q EVENING  •  Non Formulary Request, 1 capsule, Oral, Q EVENING  •  Non Formulary Request, 1 capsule, Oral, Q EVENING  •  Non Formulary Request, 1 capsule, Oral, Q EVENING  •  apixaban, 2.5 mg, Oral, BID  •  PARoxetine, 20 mg, Oral, DAILY  •  levothyroxine, 75 mcg, Oral, AM ES  •  albuterol, 2 Puff, Inhalation, Q4HRS PRN (Patient not taking: Reported on 7/28/2021)  •  oxybutynin SR, 5 mg, Oral, DAILY  •  Vitamin D3, 25 mcg, Oral, BID    Allergies   Allergen Reactions   • Ace Inhibitors      Cough     • Albuterol      Whole body spasmed  Patient tolerates Formoterol (Symbicort) - takes at home       Labs     Lab Results   Component Value Date/Time    HBA1C 5.8 (H) 05/14/2019 10:59 AM          Lab Results   Component Value Date/Time    CHOLSTRLTOT 246 (H) 05/14/2019 10:59 AM     (H) 05/14/2019 10:59 AM    HDL 43 05/14/2019 10:59 AM    TRIGLYCERIDE 228 (H) 05/14/2019 10:59 AM       Lab Results   Component Value Date/Time    SODIUM 136 07/25/2021 09:11 AM    POTASSIUM 4.2 07/25/2021 09:11 AM    CHLORIDE 100 07/25/2021 09:11 AM    CO2 24 07/25/2021 09:11 AM    GLUCOSE 127 (H) 07/25/2021 09:11 AM    BUN 10 07/25/2021 09:11 AM    CREATININE 0.71 07/25/2021 09:11 AM    CREATININE 0.63 04/30/2013 12:03 PM    BUNCREATRAT 11 09/27/2016 09:53 AM    BUNCREATRAT 21 04/30/2013 12:03 PM    GLOMRATE >59  03/04/2011 11:11 AM     Lab Results   Component Value Date/Time    ALKPHOSPHAT 79 07/25/2021 09:11 AM    ASTSGOT 16 07/25/2021 09:11 AM    ALTSGPT 12 07/25/2021 09:11 AM    TBILIRUBIN 0.5 07/25/2021 09:11 AM    INR 0.96 11/04/2019 04:33 PM    ALBUMIN 4.1 07/25/2021 09:11 AM    ALBUMIN 4.06 04/02/2021 11:18 AM      Lab Results   Component Value Date/Time    WBC 6.5 07/25/2021 09:11 AM    WBC 5.5 03/04/2011 11:11 AM    RBC 4.54 07/25/2021 09:11 AM    RBC 4.27 03/04/2011 11:11 AM    HEMOGLOBIN 14.0 07/25/2021 09:11 AM    HEMATOCRIT 41.5 07/25/2021 09:11 AM    PLATELETCT 306 07/25/2021 09:11 AM      Lab Results   Component Value Date/Time    MALBCRT 13 02/15/2012 01:15 PM    MICROALBUR 0.2 02/15/2012 01:15 PM     Immunization History   Administered Date(s) Administered   • INFLUENZA TIV (IM) 10/04/2013, 10/01/2015   • Influenza Seasonal Injectable - Historical Data 11/19/2012, 10/01/2015   • Influenza Vaccine Adult HD 02/13/2018, 11/03/2020   • Pfizer SARS-CoV-2 Vaccine 02/23/2021, 03/13/2021   • Pneumococcal Conjugate Vaccine (Prevnar/PCV-13) 02/13/2018   • Pneumococcal polysaccharide vaccine (PPSV-23) 03/22/2013, 07/13/2015   • Tdap Vaccine 05/13/2021   • ZZZInfluenza Vaccine Pediatric Preserv Free 10/07/2009   • Zoster Vaccine Recombinant (RZV) (SHINGRIX) 12/09/2020, 04/28/2021       Assessment and Plan:   • Received referral from [facility]. Medications reviewed.         Dwain Rangel, PharmD, MS, BCACP, LCC  [facility]  Phone [phone] fax [phone]    This note was created using voice recognition software (Dragon). The accuracy of the dictation is limited by the abilities of the software. I have reviewed the note prior to signing, however some errors in grammar and context are still possible. If you have any questions related to this note please do not hesitate to contact our office.

## 2021-07-30 NOTE — CASE COMMUNICATION
PT evaluation completed on 7/30/21.  Frequency 1 week 1, 2 week 3 effective 7/30/21.  Please assist with authorization as needed.  Lillie, PT to follow.

## 2021-08-03 NOTE — CASE COMMUNICATION
Quality Review Completed for 7/28 SOC OASIS by GIANNA Dias RN on August 3,  2021:  Edits completed by GIANNA Dias RN:  1. Per reports,  is #1 and #2 and  is daily due to the dementia  2.  changed to 7 per therapy  3. [id number] goals changed to supervision or partial/mod level of assist per PT eval goals  4. . changed to 7/30 for PT collaboration  5. Added exercises prescribed and walker to Activities Permitted, incontinence and ambulation to Functional Limitations, bleeding precautions to Safety Measures and F2F to 485 forms

## 2021-08-04 NOTE — CASE COMMUNICATION
On arrival Rossana was sitting on her recliner she reports that she is doing ok today. Worked on HEP and went over handout with daughter present. She did very well with little fatigue noted. She expresses understanding of HEP and appears that she will have a carry over.Worked on amb program with RW and daughter to assist for supervision with amb. Will monitor for carry over with HEP and amb program. Will cont with POC to increase her functional mob and ind to prevent further decline in IND. S/B Ruth Jacome PT

## 2021-08-08 NOTE — CASE COMMUNICATION
Rossana did very well today and is cont to progress. She fiona increase with standing ther ex and was able to increase with amb. She has less noted fatigue today. She is reporting that she has a carry over with HEP and that family is always with her for amb and standing act. Will cont with POC to increase her functional mob and ind to prevent further decline in IND. S/B Ruth Jacome PT

## 2021-08-09 NOTE — CASE COMMUNICATION
I agree with these changes.  ----- Message -----  From: Melita Dias R.N.  Sent: 8/3/2021  12:20 PM PDT  To: Tanisha Ruvalcaba R.N.      Quality Review Completed for 7/28 SOC OASIS by GIANNA Dias RN on August 3,  2021:  Edits completed by GIANAN Dias RN:  1. Per reports,  is #1 and #2 and  is daily due to the dementia  2.  changed to 7 per therapy  3. [id number] goals changed to supervision or partial/mod level of assist per PT eval goals  4. . changed to 7/30 for PT collaboration  5. Added exercises prescribed and walker to Activities Permitted, incontinence and ambulation to Functional Limitations, bleeding precautions to Safety Measures and F2F to 485 forms

## 2021-08-10 NOTE — CASE COMMUNICATION
Rossana is cont to progress with noted decrease in pain She has the ablity to do more amb however family wants to sup all amb at all times. She did very well amb outside today and expresses understanding with all task. She is reporting a carry over with HEP. Will cont with POC to increase her functional mob and ind to prevent further decline in function. S/B Ruth Jacome PT

## 2021-08-11 NOTE — CASE COMMUNICATION
Rossana has cont to report that she is doing good no noted pain and that she is doing all act like normal but with family present at all times. Family supervises her for all act. She has an OK carry over with HEP but not daily. Educated them with use of restorator if they would like to purchase in place of ther ex. She does like to walk but do to air quality has been limited. Educated them with DC plans for next week and they are good with POC. PT to f/u on next visit Will cont with POC to increase her functional mob and ind to prevent further decline in IND. S/B Ruth Jacome PT

## 2021-08-31 NOTE — TELEPHONE ENCOUNTER
ESTABLISHED PATIENT PRE-VISIT PLANNING     Patient was NOT contacted to complete PVP.     Note: Patient will not be contacted if there is no indication to call.     1.  Reviewed notes from the last few office visits within the medical group: Yes    2.  If any orders were placed at last visit or intended to be done for this visit (i.e. 6 mos follow-up), do we have Results/Consult Notes?         •  Labs - Labs ordered, completed on 7/27/2021 and results are in chart.  Note: If patient appointment is for lab review and patient did not complete labs, check with provider if OK to reschedule patient until labs completed.       •  Imaging - Imaging was not ordered at last office visit.       •  Referrals - Referral ordered, patient was seen and consult notes are in chart. Care Teams updated  YES.    3. Is this appointment scheduled as a Hospital Follow-Up? No    4.  Immunizations were updated in Epic using Reconcile Outside Information activity? Yes    5.  Patient is due for the following Health Maintenance Topics:   Health Maintenance Due   Topic Date Due   • Annual Pulmonary Function Test / Spirometry  05/17/2017   • COVID-19 Vaccine (3 - Pfizer risk 3-dose series) 04/10/2021   • IMM INFLUENZA (1) 09/01/2021       6.  AHA (Pulse8) form printed for Provider? No, already completed       no SI/HI

## 2021-09-01 NOTE — PROGRESS NOTES
Pt was seated, confirmed pt name and , procedure explained to pt, venipuncture performed in LAC using aseptic technique, 1 gold tube collected, gauze placed with pressure on venipuncture site until hemostasis observed, site clean and dry, no redness or swelling observed, bandage placed, pt tolerated well and voiced no concerns.

## 2021-09-01 NOTE — PROGRESS NOTES
"Subjective:     CC:   Chief Complaint   Patient presents with   • Follow-Up   • Headache   • Spasms     muscle spasms in lobby   • Memory Loss     memory still fuzzy         HPI:   Tania presents today with 2 daughters, last appt w/ me was . Did cancel her f/u with her cardiologist and her echo.  Did a round of home PT bc of a prior fall. Isn't consistent w/ her exercises bc of spasms in her sacrum that cause her upper body to shake. 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  Feels like a hot tape around her head, is a constant pain, sometimes can't sleep bc of this. Might have been an osteoma, was benign.  Family says these spasms occur more when she has stressful situations.  Daughter says she started having feelings that \"her time is near.\"  dtr just returned from a 10d vacation and she thought she might not live until she returned. Has reviewed her  plans, wants to change to palliative care or hospice for better pain management,     CT head  in ED moderate atrophy and diffuse white matter changes. MRI brain  with advanced chronic microvascular ischemic type changes. Mild age-related volume loss. No acute intracranial abnormality.    Using ditropan 5mg, is continent 95% of the time. dtr wakes her at 530a to use bathroom to help prevent UI. Has been on it for 5y.     Problem   Mild Cognitive Impairment (Resolved)    mmse 26/30  (off for county, only remembered 2/3 at 5min, unable to name a pen, called it a pencil multiple times), and abnl clock draw - set to 1150. Defers neuropsych testing for now. She has deferred neuropsych testing. She has difficulty with word-finding. She struggles to " remember things eg yesterday updated her calendar and she had to repeat what days she was changing appts to.   5/21 I increased her paxil to 20mg.   She lives alone, no longer drives, cooks some meals, daughter brings leftovers as well. Goes to son's for dinner after Worship on Sundays.     MRI showed :   Mild generalized volume loss. No disproportionate hippocampal or temporal lobe volume loss is seen.  Confluent and scattered T2 hyperintensities in the periventricular, deep and subcortical cerebral white matter and mild patchy in the samaria are nonspecific, most likely representing chronic microvascular ischemic changes. Findings have increased from the   prior brain MRI.  No acute intracranial hemorrhage, extra-axial fluid collection, mass effect, midline shift or hydrocephalus. No restricted diffusion to suggest acute infarct.  Proximal vascular flow voids are patent.  Paranasal sinuses and mastoids are clear.  Bone marrow signal is normal. Both globes demonstrate prior lens surgery.  IMPRESSION:  1.  Advanced chronic microvascular ischemic type changes.  2.  Mild age-related volume loss.  3.  No acute intracranial abnormality.         Current Outpatient Medications Ordered in Epic   Medication Sig Dispense Refill   • PARoxetine (PAXIL) 30 MG Tab Take 1 Tablet by mouth every day. 30 Tablet 2   • traMADol (ULTRAM) 50 MG Tab Take 1 Tablet by mouth 1 time a day as needed for Severe Pain for up to 30 days. 30 Tablet 0   • Naloxone (NARCAN) 4 MG/0.1ML Liquid One spray in one nostril for overdose and call 911. 1 Each 1   • vitamin D, Ergocalciferol, (DRISDOL) 1.25 MG (65344 UT) Cap capsule Take 1 Capsule by mouth every 7 days. On Tue 12 Capsule 1   • fluticasone (FLONASE) 50 MCG/ACT nasal spray Administer 1 Spray into affected nostril(S) as needed. Indications: Signs and Symptoms of Nose Diseases     • Multiple Vitamins-Minerals (OCUVITE-LUTEIN) Tab Take 1 tablet by mouth every day.     • Non Formulary Request Take 1  tablet by mouth every day. Brain/Memory w huperzine  (OTC)     • acetaminophen (TYLENOL) 650 MG CR tablet Take 650 mg by mouth every 6 hours as needed for Moderate Pain.     • guaiFENesin (MUCINEX PO) Take 20 mL by mouth as needed (For cough).     • Cetirizine HCl (KLS ALLER-JERAMY PO) Take 1 tablet by mouth as needed (For allergies). For allergies     • Non Formulary Request Take 1 capsule by mouth every evening. Bone Nutrient (Doterra)     • Non Formulary Request Take 1 capsule by mouth every evening. (Doterra) EO Chris Essential oil Omega     • Non Formulary Request Take 1 capsule by mouth every evening. (Doterra) Alpha CRS and Cellular Vitality     • Non Formulary Request Take 1 capsule by mouth every evening. (Doterra) Micro Plex VMz Food Nutrient     • apixaban (ELIQUIS) 2.5mg Tab Take 1 tablet by mouth 2 times a day. 200 tablet 2   • levothyroxine (SYNTHROID) 75 MCG Tab Take 1 tablet by mouth every morning on an empty stomach. 90 tablet 1   • oxybutynin SR (DITROPAN-XL) 5 MG TABLET SR 24 HR Take 1 Tab by mouth every day. 90 Tab 3   • Cholecalciferol (VITAMIN D3) 25 MCG Tab Take 25 mcg by mouth 2 times a day.     • TURMERIC PO Take 500 mg by mouth every day.       No current [facility]-ordered facility-administered medications on file.       Past Medical History:   Diagnosis Date   • Allergic rhinitis    • Arthritis     Hands   • ASTHMA    • Atrial fibrillation (HCC) January 2015    New onset. Echocardiogram with normal LV size, mild concentric LVH, LVEF 65-70%. Normal RA and LA. Mild MR, mild TR, RVSP 35-40mmHg. June 2015: GI bleed, Coumadin stopped/contraindicated.   • B12 deficiency    • Cancer (HCC)     Vulvar; skin; thyroid   • CHI (closed head injury)    • COPD (chronic obstructive pulmonary disease) (HCC)    • Degenerative joint disease    • Dental disorder     One broken tooth   • Depression    • GERD (gastroesophageal reflux disease)    • Hemorrhoids    • [place] (hard of hearing)      Bilaterally   • HSV-1  "infection    • Hypertension    • Hypothyroidism    • MEDICAL HOME    • Meningitis    • Osteoporosis    • Shingles    • Sinusitis due to Moraxella catarrhalis    • Stress incontinence    • Thyroid cancer (HCC)     benign   • Vulvar cancer (HCC)        Social History     Tobacco Use   • Smoking status: Passive Smoke Exposure - Never Smoker   • Smokeless tobacco: Never Used   Vaping Use   • Vaping Use: Never used   Substance Use Topics   • Alcohol use: Not Currently     Alcohol/week: 0.6 oz     Types: 1 Standard drinks or equivalent per week     Comment: rarely   • Drug use: No       Allergies:  Ace inhibitors and Albuterol    Health Maintenance: Completed    ROS:  Per HPI      Objective:       Exam:  /72 (BP Location: Left arm, Patient Position: Sitting, BP Cuff Size: Adult)   Pulse 75   Temp 36.5 °C (97.7 °F) (Temporal)   Resp 18   Ht 1.499 m (4' 11\")   Wt 56.2 kg (123 lb 12.8 oz)   LMP  (LMP Unknown)   SpO2 96%   BMI 25.00 kg/m²   Body mass index is 25 kg/m².  Wt Readings from Last 4 Encounters:   09/01/21 56.2 kg (123 lb 12.8 oz)   07/28/21 55.8 kg (123 lb)   07/27/21 56.1 kg (123 lb 9.6 oz)   07/25/21 56 kg (123 lb 7.3 oz)       Gen: Alert and oriented, No apparent distress. Appropriately groomed.  Neck: Neck is supple without lymphadenopathy.No thyromegaly.   Lungs: Normal effort, CTA bilaterally, no wheezes, rhonchi, or rales.  CV: Regular rate and rhythm. No murmurs, rubs, or gallops.  ABD:  Soft, nontender, nondistended, NABSx4, no HSM or RBT or guarding or masses.  Ext: No clubbing, cyanosis, edema.  Skin: No rash noted.      Labs:   Results for orders placed or performed during the hospital encounter of 07/27/21   TSH WITH REFLEX TO FT4   Result Value Ref Range    TSH 0.850 0.380 - 5.330 uIU/mL         Assessment & Plan:     86 y.o. female with the following -   I had a long discussion with the patient and her 2 daughters.  It is unclear what is causing the spasms.  Her family does not think it " is her mood causing this and they do not think this could be a conversion disorder.  I will reorder brain MRI with and without contrast to ensure there is no recurrence of the prior lesion that needed to be resected.  I will increase her Paxil to see if that helps with her thoughts of death.  I will give her tramadol as needed for pain and warned regarding possible dependence and side effects.  I encouraged her to continue on the Eliquis due to her underlying A. Fib.  I will see her back after the MRI..    1. Vascular dementia without behavioral disturbance (HCC)    2. MDD (major depressive disorder), recurrent episode, moderate (HCC)    3. Chronic nonintractable headache, unspecified headache type  - MR-BRAIN-WITH & W/O; Future  - Basic Metabolic Panel; Future  - traMADol (ULTRAM) 50 MG Tab; Take 1 Tablet by mouth 1 time a day as needed for Severe Pain for up to 30 days.  Dispense: 30 Tablet; Refill: 0  - URINE DRUG SCREEN; Future  - Controlled Substance Treatment Agreement    4. Abnormal movement  - MR-BRAIN-WITH & W/O; Future  - Basic Metabolic Panel; Future  - traMADol (ULTRAM) 50 MG Tab; Take 1 Tablet by mouth 1 time a day as needed for Severe Pain for up to 30 days.  Dispense: 30 Tablet; Refill: 0  - URINE DRUG SCREEN; Future    Other orders  - PARoxetine (PAXIL) 30 MG Tab; Take 1 Tablet by mouth every day.  Dispense: 30 Tablet; Refill: 2  - Consent for Opiate Prescription  - Naloxone (NARCAN) 4 MG/0.1ML Liquid; One spray in one nostril for overdose and call 911.  Dispense: 1 Each; Refill: 1        Return in about 2 weeks (around 9/15/2021), or mri brain.    Please note that this dictation was created using voice recognition software. I have made every reasonable attempt to correct obvious errors, but I expect that there are errors of grammar and possibly content that I did not discover before finalizing the note.

## 2021-09-08 NOTE — TELEPHONE ENCOUNTER
----- Message from Lillie Pleitez M.D. sent at 9/8/2021  1:27 PM PDT -----  Please call the patient, let her and  her daughter or daughter in law know that her brain MRI did not show anything worrisome or what would be causing her headaches or spasms.  Continue with the higher dose of Paxil as I prescribed at her last appointment.  Let me know if she needs anything sooner than the scheduled follow-up in October.  If she needs to get seen sooner than then I am happy to find her a place.

## 2021-09-08 NOTE — TELEPHONE ENCOUNTER
1. Caller Name: Yareli                           Call Back Number: [phone] (home)     Relayed information to daughter in law. No other questions and ok to keep October apt. LM

## 2021-09-10 NOTE — TELEPHONE ENCOUNTER
Please let the patient and her daughter know that the MRI did not show anything worrisome.  There is definitely nothing that is causing her spasms.  It does show an old stroke which is not causing her symptoms.  I have no way of knowing how old the stroke is.  She should still be taking the blood thinner Eliquis which will help to prevent any future strokes.  She can see me back for a next available appointment in the next month if that is all right with her.

## 2021-09-10 NOTE — TELEPHONE ENCOUNTER
Advised her daughter. Daughter states she was already informed earlier this week. Asked if she had any questions. Daughter said no and will Mychart us if anything

## 2021-09-10 NOTE — TELEPHONE ENCOUNTER
----- Message from Ese Stanton R.N. sent at 9/7/2021  2:45 PM PDT -----  Regarding: MRI results    ----- Message -----  From: Nena Christensen  Sent: 9/7/2021   1:49 PM PDT  To: [facility]    PCP: Dr. Pleitez    Patient had appointment on 9/16, but provider has requested that tie off and no other dates available to fit patient needs. She is having MRI done today (9/7). Please have provider review results and see if results can be called into patient, or if f/v required possibly with another provider. Please advise, Thank you!

## 2021-10-13 NOTE — PROGRESS NOTES
Per [employer] App message 10/13/2, see below.      Is contact the patient's daughter or daughter-in-law and let them know that the worsening symptoms are very concerning.  Please schedule a next available appointment with me  Let them know I referred her to see a neurologist urgently if possible as well as a speech evaluation regarding the choking.  Also, please ask if she seems to be choking more on liquids or foods.  If she is choking on liquids and try to change to honey thickened liquids and if it is food try to avoid common triggers as I am sure they are ready doing.  Gen: no fevers/chills, no nightsweats, no changes in weight  Eyes: no changes in vision, no dry eyes  ENT: no sore throat, no dysphagia, no hearing loss, no bloody nose  Pulm: no sob, no cough, no wheezing  CV: no chest pain, no palpitations, no syncope  GI: no nausea/vomiting, no diarrhea/constipation, no abdominal pain, no blood in stool  : no dysuria, no incontinence  MSk: no myalgias, no weakness, no falls  Skin: no rash  Neuro: no headaches, no numbness/tingling  Heme/Lymph: no easy bruising  Psych: no depression, no anxiety, no hallucinations, no insomnia, no memory concerns          Just wanted to send you an update on Rossana Aguilar.   Some changes…   •incontinent 30-40% of the time   •mood changes/more aggressive tone at times   •sleeping 12-14 hours, depending on what’s scheduled for the day.  •starting to choke more often while eating   •muscle spasms have increased, as well as some involuntarily arm movements on both sides    (similar to a post stroke arm)   •head pain has continued, more intense & across entire top of head  •memory has continued to decline      Should we schedule our next appointment with you?  Thank you,   Etta Ferrer & Adelaida Feliz

## 2021-10-13 NOTE — TELEPHONE ENCOUNTER
----- Message from Alyssa Francois R.N. sent at 10/13/2021  1:42 PM PDT -----  Regarding: FW: Rossana Aguilar    ----- Message -----  From: KamiRossana Aguilar  Sent: 10/13/2021  12:54 PM PDT  To: [facility]  Subject: Rossana Aguilar                                   Just wanted to send you an update on Rossana Aguilar.   Some changes…   •incontinent 30-40% of the time   •mood changes/more aggressive tone at times   •sleeping 12-14 hours, depending on what’s scheduled for the day.  •starting to choke more often while eating   •muscle spasms have increased, as well as some involuntarily arm movements on both sides    (similar to a post stroke arm)   •head pain has continued, more intense & across entire top of head  •memory has continued to decline     Should we schedule our next appointment with you?  Thank you,   Etta Ferrer & Adelaida Feliz

## 2021-10-13 NOTE — PROGRESS NOTES
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     HPI:     Tania Aguilar is a 84 y.o. female here for follow up of the above medical issue.  In summary she has severe osteoporosis because of having a fragility fracture despite being on therapy with denosumab which was being given by another doctor.  Last bone density on November 21, 2019 showed the lowest T score of -2.7 for the left femur compatible with osteoporosis.  She was diagnosed with a T12 compression fracture on November 4, 2019 after a fall.   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  She is now being seen by Dr. Sinha and is being monitored for MGUS        She was on anabolic therapy with Tymlos 80 mcg subcu daily for her severe osteoporosis from Jan 2020 to June 2020.  Treatment was interrupted because of medication coverage and she was finally able to restart the anabolic medication for osteoporosis on February 1, 2021 but she stopped it on April 1st 2021.  In total she got 8 months therapy with Tymlos.          Since last visit patient reports feeling good.  She denies interval fracture.     She reports that she recently blacked out in the bathroom and when she woke up in her bed.   Because of this event she moved to her son's home.  She denies interval nephrolithiasis.  She reports adherence to calcium supplementation recommendations.  She reports adherence to vitamin D supplementation.  Her activity level: no regular exercise, sedentary    Her last 25-hydroxy vitamin level was 84 on 10/1/2021  Calcium was normal at 9.1   Serum crea was 0.91      She has a history of iatrogenic hyperthyroidism because she was being given too much thyroid hormone by another endocrinologist despite her history of atrial fibrillation and  osteoporosis.  Originally she was taking levothyroxine 200+ Cytomel 25 mcg once a day given by Dr. Prasad    On follow-up she is now taking levothyroxine 75 MCG daily which has been her medication for the past 3-6 months    Her most recent TSH is normal at 0.870 with a free T4 of 1.17 on 10/2021    She denies palpitations, tremors, insomnia and other hyperthyroid symptoms        Patient's medications, allergies, and social histories were reviewed and updated as appropriate.      ROS:       CONS:     No fever, no chills   EYES:     No diplopia, no blurry vision   CV:           No chest pain, no palpitations   PULM:     No SOB, no cough, no hemoptysis.   GI:            No nausea, no vomiting, no diarrhea, no constipation   ENDO:     No polyuria, no polydipsia, no heat intolerance, no cold intolerance     Past Medical History:  Problem List:  2021-07: Vascular dementia without behavioral disturbance ([facility])  2021-06: Disorder of arteries and arterioles ([facility])  2021-06: Vulvar cancer ([facility])  2021-06: Macular degeneration  2021-05: PND (post-nasal drip)  2021-03: DNR (do not resuscitate)  2021-01: Age-related osteoporosis with current pathological fracture  2020-11: Multiple myeloma not having achieved remission ([facility])  2020-08: Herpes zoster without complication  2020-01: Cough  2019-12: Bence Cleveland proteinuria  2019-12: Vitamin D deficiency  2019-11: Compression fracture of T12 vertebra with routine healing  2019-10: Breast pain, right  2019-02: Poor balance  2017-07: Mild intermittent asthma without complication  2016-09: Osteoporosis  2016-09: Seasonal allergies  2016-09: Prediabetes  2016-07: SEA on CPAP  2015-11: Weight loss  2015-11: Diarrhea  2015-11: Neck stiffness  2015-08: Mild cognitive impairment  2015-06: COPD exacerbation ([facility])  2015-05: Anemia  2015-05: Acute gastrointestinal hemorrhage  2015-05: Abdominal pain, other specified site  2015-05: Nausea & vomiting  2015-05: Atrial fibrillation ([facility])  2015-05:  Hyponatremia  2015-05: SBO (small bowel obstruction) ([facility])  2015-05: Supratherapeutic INR  2015-02: Chronic anticoagulation  2015-02: MEDICAL HOME  2015-01: COPD with exacerbation ([facility])  2015-01: Pneumonia  2015-01: Atrial fibrillation with RVR ([facility])  2013-12: Other and unspecified ovarian cyst  2011-06: Vertigo, benign positional  2011-05: B12 deficiency  2011-05: Hard of hearing  2009-08: Essential hypertension, benign  2009-08: Hypothyroidism  2009-08: Sinusitis due to Moraxella catarrhalis  2009-08: Allergic rhinitis  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  2009-07: Preventative health care  COPD (chronic obstructive pulmonary disease) ([facility])  Mixed incontinence urge and stress  CHI (closed head injury)  MDD (major depressive disorder), recurrent episode, moderate ([facility])  DJD (degenerative joint disease)  Asthma, mild intermittent, well-controlled  GERD (gastroesophageal reflux disease)  History of vulvar dysplasia  Heart murmur      Past Surgical History:  Past Surgical History:   Procedure Laterality Date   • GASTROSCOPY WITH BIOPSY N/A 5/7/2015    Procedure: GASTROSCOPY WITH BIOPSY;  Surgeon: Travis Jones M.D.;  Location: [facility];  Service:    • COLONOSCOPY WITH BIOPSY N/A 5/7/2015    Procedure: COLONOSCOPY WITH BIOPSY;  Surgeon: Travis Jones M.D.;  Location: [facility];  Service:    • HYSTERECTOMY ROBOTIC  12/27/2013    Performed by Zoltan Salazar M.D. at [facility]   • CYSTOSCOPY STENT PLACEMENT  12/27/2013    Performed by Zoltan Salazar M.D. at [facility]   • OTHER       Removal Rt sinus osteoma   • OTHER SURGICAL PROCEDURE      ORx3 cancer removal (vulvar Ca.)   • THYROIDECTOMY       Due to thyroid cancern   • TONSILLECTOMY          Allergies:  Ace inhibitors and Albuterol     Social History:  Social History     Tobacco Use   • Smoking status: Passive Smoke Exposure - Never Smoker   • Smokeless tobacco: Never Used   Vaping Use   • Vaping Use: Never used  "  Substance Use Topics   • Alcohol use: Not Currently     Alcohol/week: 0.6 oz     Types: 1 Standard drinks or equivalent per week     Comment: rarely   • Drug use: No        Family History:   family history includes Cancer in her brother, father, sister, and sister; Heart Disease in her mother, sister, and son; Hyperlipidemia in her daughter; Other in her sister.      PHYSICAL EXAM:   Vital signs: /76 (BP Location: Left arm, Patient Position: Sitting, BP Cuff Size: Adult)   Pulse 82   Resp 16   Ht 1.499 m (4' 11\")   Wt 58.2 kg (128 lb 3.2 oz)   LMP  (LMP Unknown)   SpO2 100%   BMI 25.89 kg/m²   GENERAL: Elderly female in no apparent distress.    EYE:  No ocular asymmetry, PERRLA  HENT: Pink, moist mucous membranes.    NECK: No thyromegaly.   CARDIOVASCULAR:  No murmurs  LUNGS: Clear breath sounds  BACK: No spinal tenderness on palpation  ABDOMEN: Soft, nontender   EXTREMITIES: No clubbing, cyanosis, or edema.   NEUROLOGICAL: No gross focal motor abnormalities   LYMPH: No cervical adenopathy seen  SKIN: No rashes, lesions.       Labs:    Lab Results   Component Value Date/Time    WBC 6.5 07/25/2021 09:11 AM    WBC 5.5 03/04/2011 11:11 AM    RBC 4.54 07/25/2021 09:11 AM    RBC 4.27 03/04/2011 11:11 AM    HEMOGLOBIN 14.0 07/25/2021 09:11 AM    MCV 91.4 07/25/2021 09:11 AM    MCV 91 03/04/2011 11:11 AM    MCH 30.8 07/25/2021 09:11 AM    MCH 30.9 03/04/2011 11:11 AM    MCHC 33.7 07/25/2021 09:11 AM    RDW 44.7 07/25/2021 09:11 AM    RDW 13.8 03/04/2011 11:11 AM    MPV 9.5 07/25/2021 09:11 AM       Lab Results   Component Value Date/Time    SODIUM 133 (L) 10/01/2021 01:18 PM    POTASSIUM 4.5 10/01/2021 01:18 PM    CHLORIDE 99 10/01/2021 01:18 PM    CO2 27 10/01/2021 01:18 PM    ANION 7.0 10/01/2021 01:18 PM    GLUCOSE 104 (H) 10/01/2021 01:18 PM    BUN 11 10/01/2021 01:18 PM    CREATININE 0.91 10/01/2021 01:18 PM    CREATININE 0.63 04/30/2013 12:03 PM    CALCIUM 9.1 10/01/2021 01:18 PM    ASTSGOT 13 " 10/01/2021 01:18 PM    ALTSGPT 9 10/01/2021 01:18 PM    TBILIRUBIN 0.4 10/01/2021 01:18 PM    ALBUMIN 4.4 10/01/2021 01:18 PM    ALBUMIN 4.06 04/02/2021 11:18 AM    TOTPROTEIN 7.4 10/01/2021 01:18 PM    TOTPROTEIN 6.9 04/02/2021 11:18 AM    GLOBULIN 3.0 10/01/2021 01:18 PM    AGRATIO 1.5 10/01/2021 01:18 PM       Lab Results   Component Value Date/Time    TSHULTRASEN <0.005 (L) 11/14/2019 1028     Lab Results   Component Value Date/Time    FREET4 2.08 (H) 11/14/2019 1028     Lab Results   Component Value Date/Time    FREET3 3.75 11/14/2019 1028     No results found for: THYSTIMIG        Imaging:        ASSESSMENT/PLAN:     1. Age-related osteoporosis with current pathological fracture with routine healing, subsequent encounter  Stable without interval fracture  Patient has severe osteoporosis at baseline with compression fractures  Stop Tymlos due to cost  And start Reclast to prevent further bone loss  I am sending the order to [facility] infusion center  Recommend that she continue calcium 600 mg twice a day  Recommend that she continue vitamin D replacement therapy  With ergocalciferol  Recommend regular exercise  I plan to repeat her bone density in November 2021  Her daughter is going to send me a [employer] message reminder on November 20, 2021 so I can order her bone density  For unclear reasons when I order her bone density right now her insurance is not covering it  I will see her again in 6 months      2. Acquired hypothyroidism  Controlled  Continue Levothyroxine 75 mcg daily   we will plan for repeat TSH and free T4 levels in 6 months    3. Vitamin D deficiency  Controlled continue ergocalciferol repeat calcium 25-hydroxy panel 6 months    4. Compression fracture of T12 vertebra with routine healing  Stable denies any new fractures continue follow-up with orthopedics for pain management      Return in about 6 months (around 4/13/2022).      Thank you kindly for allowing me to participate in the  endocrine care plan for this patient.    Toñito Luevano MD, [facility]  12/04/19    CC:   Ezequiel Grewal M.D.

## 2021-10-14 NOTE — PATIENT COMMUNICATION
Contacted patient's daughters and relayed PCP concerns . Daughters report that they do not want to pursue neurology right now or swallow study because patient has has swallowing issues for a couple of years and it has worsened a little recently. They set up an appt with PCP for November and will monitor for adverse symptoms. Changing diet to softer foods.

## 2021-10-14 NOTE — TELEPHONE ENCOUNTER
Regarding: Rossana Aguilar  ----- Message from Lillie Pleitez M.D. sent at 10/13/2021  2:20 PM PDT -----       ----- Message from Rossana Aguilar to Lillie Pleitez M.D. sent at 10/13/2021 12:54 PM -----   Just wanted to send you an update on Rossana Aguilar.   Some changes…   •incontinent 30-40% of the time   •mood changes/more aggressive tone at times   •sleeping 12-14 hours, depending on what’s scheduled for the day.  •starting to choke more often while eating   •muscle spasms have increased, as well as some involuntarily arm movements on both sides    (similar to a post stroke arm)   •head pain has continued, more intense & across entire top of head  •memory has continued to decline     Should we schedule our next appointment with you?  Thank you,   Etta Ferrer & Adelaida Feliz

## 2021-10-23 NOTE — TELEPHONE ENCOUNTER
----- Message from Tania Aguilar sent at 10/22/2021  3:10 PM PDT -----  Regarding: albuterol inhaler  Rossana Aguilar has developed more of a cough the last three days, which we have monitored closely. The rescue inhaler helps immediately, we want to make sure we aren't without that. Could you please authorize a refill of this?     When asked, Rossana states she has some pressure in the chest area, as well as phlegm coming up when she coughs. No fever. Nurse who comes to our home for a client, listened to Chase lungs & stated there was wheezing on both sides of the lungs, but worse on the left. We wanted to keep you informed of any changes with Rossana & will continue to monitor things closely.  Thank you,  Charissa Feliz/nelida/care giver  [phone]

## 2021-10-23 NOTE — PATIENT COMMUNICATION
Called and spoke with granddaughter. Started 3 days ago with coughing. Inhaler helps immediately, no pain just congestion, with BL wheeze. No fever, no headache, and is drinking and eating well. No runny nose or sore throat. Does not want to go to urgent care unless she has other or worsening symptoms. Granddaughter just wants to make sure inhaler refill is available.

## 2021-10-25 NOTE — PATIENT COMMUNICATION
"Called and spoke with granddaughter, Charissa. Charissa was informed that Albuterol refill was sent in, Charissa stated Rossana's condition \"has definitely not gotten worse\"- Rossana has only needed to use inhaler a couple of times a day. At this point Charissa does not feel Rossana needs the chest X-ray, however she is aware the order has been placed if needed. Charissa thankful for call and verbalized she will let us know if condition worsens.   "

## 2021-10-25 NOTE — PROGRESS NOTES
Please call to check on patient  Make sure she knows refill for albuterol approved  I ordered a CXR to do if she has a fever or worsening cough/wheeze.  I ordered it stat so she can just walk in. No need to do the CXR if she is feeling better. Offer appt w/ me or other provider in our office if necessary this week if any concern.   Thank you.

## 2021-11-02 NOTE — TELEPHONE ENCOUNTER
----- Message from Lillie Pleitez M.D. sent at 11/2/2021  6:58 AM PDT -----  I sent family a [employer] message re: the refill on ultram  Pls read my message and make sure she gets an appt to see me as soon as she returns from her trip to [place].  Thank you.  Lillie Pleitez M.D.

## 2021-11-02 NOTE — TELEPHONE ENCOUNTER
Patient has appointment scheduled for 18th @ 9:00 am. No sooner openings available without cancellation.

## 2021-11-03 NOTE — PATIENT COMMUNICATION
Called Etta and relayed Maik's recommendations regarding upcoming trip. Also sent Controlled Substance agreement via [employer], explained to Etta it needs to be signed and returned. They can mail it back to the office, fax it to use or drop if off.

## 2021-11-03 NOTE — TELEPHONE ENCOUNTER
Regarding: Pain med rx  ----- Message from Lillie Pleitez M.D. sent at 11/3/2021  9:04 AM PDT -----       ----- Message from Rossana Aguilar to Lillie Pleitez M.D. sent at 11/2/2021  9:03 AM -----   Rossana continues to have daily pain in her head and some back and rib pain. She is asked what her pain level is at, and as we discussed, if it is at a 5 or higher, she takes a pain pill.   After 30-45 minutes, I ask her what her pain is at, she states a 3-4 or that it is gone.   Her mental status has continued the slow decline, she is using a hand strap that holds a fork or spoon as she can’t hold on to silverware for very long any more.   She has had some scary moments of confusion, seeing things, and a little bit of paranoia, swearing she had four hearing aides and what did I do with them.   Her walking has continued to be worse, very unstable, and a wheelchair is now being used for outings if she needs to do alot of walking.  She has 6 pain pills left. She has an appt. with you on the 18th.      ----- Message -----       From:Physician Lillie Pleitez       Sent:11/2/2021  6:58 AM PDT         To:Tania Tracy Jeff    Subject:Pain med rx    Hello,  I'm glad her cough is improved, but I had not intended for her to stay on the pain medication indefinitely.  We also have protocols for taking a painkiller regularly and she would need to come into the office to this discuss this further. I do not refill painkillers outside of an office appointment, and the reason is so that I can be sure to discuss with the patient whether the current dose is necessary and to ensure there are no worrisome side effects.   Please send me updated information on why she continues to need the pain medicine almost daily, how it is helping her, and whether she is having any problems with the med. Let me know exactly how many pills she will need to hold her over until she can get back into the office for a scheduled appointment with me. I  will refill the number she needs to hold her over.   Thank you very much.  Lillie Pleitez M.D.      ----- Message -----       From:Tania Tracy Jeff       Sent:10/28/2021  5:52 PM PDT         To:Physician Lillie Pleitez    Subject:Pain med rx    Dr Pleitez, Rossana’s cough is much better. Has not needed inhaler for a couple days. We would like to get a refill rx for her pain medicine. She has been taking one almost every day lately and has five left. She will be going to [place] to her [facility] wedding on Thursday so we want to be sure she has it if she needs it.  Thank you Adelaida Feliz

## 2021-11-03 NOTE — TELEPHONE ENCOUNTER
Called and spoke to daughter. Relayed information per PCP request. Daughter is concerned about rapid cognitive decline. Has appt here on 11/18/21. Discussed techniques to improve quality of life especially with upcoming trip to [place]. Discussed pain management. Informed PCP.

## 2021-11-03 NOTE — PROGRESS NOTES
I have sent a refill for 90-day supply to her pharmacy.  Please make sure her family is aware.  Please also let them know that I printed a controlled substance use informed consent as I did not give that to her at her last office appointment as I had not anticipated this to be a long-term prescription. I put it on Margaret's desk.   Please ask if the family has time to pick this up at the office and this could be signed.  Also please warn the family that I expect her cognitive status might worsen on this trip to [place] as changes in her routine might cause some confusion.  I do not mean to say that she should not go to her family members wedding but just that the family should be aware and try to keep things as routine as possible for her during the trip.

## 2021-11-15 NOTE — TELEPHONE ENCOUNTER
Pt's family had called earlier in the morning with concerns. Returned their call this afternoon, spoke with Etta (daughter) and Jena (daughter-in-law). They both report pt had an elevated Bp yesterday (209/98), today's Bp was 138/78 and 157/79 with a pulse of 73. Also pt's O2 sats have remained pretty steady at 89% on RA. Pt refused all night time meds last night. She also has had some very physical spasms yesterday and today. The spasms increase the anxious feelings pt is having. Pt took Tramadol yesterday and felt nauseous causing pt to refuse further PO medications. Family did say pt took her Eliquis today.     Pt has urinated a couple of times in the morning. Family states she has only eaten 3 tablespoons of applesauce today.  Pt also refused Cpap last night.     Spoke w/Maik over the phone. Called Etta back this afternoon and advised that Maik has sent in new med, Seroquel. Explained directions to start with low dose and how to increase if needed. Explained Maik recommends pt take the Paxil every day-do not stop medication suddenly. Explained that pt needs to be tapered off med, if family is interested they can cut tablet in half and give pt 1/2 tablet daily for a week and then stop medication altogether.     Gave Maik's recommended order of medication ingestion by importance: 1. Paxil (full or half dose). 2. Synthroid. 3. Eliquis.   Maik also recommends stopping Oxybutynin because the med can contribute to anxiety.     Etta also mentioned changing Thursday's appt to virtual. Agreed to wait until closer to appt date to change appt. Set chart reminder to check-in w/pt and family on Wednesday- but Etta has my direct extension if needed and encouraged to call if something arises.

## 2021-11-15 NOTE — TELEPHONE ENCOUNTER
Phone Number Called: [phone] (home)   Tania Tracy Jeff    Call outcome: Spoke to patient regarding message below.    Message: Patient's daughter wants an urgent call back. She would like a referral to hospice care. Stated spoke to Karla about this. Will try and have a nurse call today if not tomorrow. Please advise.

## 2021-11-15 NOTE — TELEPHONE ENCOUNTER
Call from KATYA Stanton.  Family wants urgent hospice referral. RN will document info.  I explained this could be delirium (I think she had a recent trip to family)  I'm happy to order lab w/u for that but we don't need to if family defers.  Fine to consult hospice but pls tell family I'll order seroquel (start low, go slow and warn re: side effects of sudden death, somnolence) w/ the idea to take short term to help w/ her anxiety and agitation (had elevated bp, refusing all meds and not eating/drinking).  I would rec she take paxil so as not to withdraw. Should try to take synthroid.  Ok if doesnt want eliquis for a few days or if goes on hospice.    Please tell family if she misses a few days of synthroid can just catch up with double doses if/when becomes agreeable to take meds.

## 2021-12-20 ENCOUNTER — HOME CARE VISIT (OUTPATIENT)
Dept: HOSPICE | Facility: HOSPICE | Age: 86
End: 2021-12-20
Payer: MEDICARE

## 2022-10-21 NOTE — TELEPHONE ENCOUNTER
Phone Number Called: [phone]    Call outcome: Spoke to Patient Regarding Message Below     Message: Will like a referral to Cardiology. Also made Appointment to come see Dr. Pleitez Next week to have more information on the results and have DMV paperwork filled out.   complains of pain/discomfort

## 2023-01-23 NOTE — TELEPHONE ENCOUNTER
From: Tania Aguilar  To: Physician Lillie Pleitez  Sent: 2021 7:02 AM PST  Subject: New rx for Oxybutynin ER 5 mg    Rossana needs a new prescription for this medication as the prescription from Dr. Grewal has . Thank you  
Please let the patient's family know I will refill this med but if her memory is worsening then this med can sometimes further aggravate her memory/confusion. I remember they told me the med helps about 95% of the time w/ her incontinence. They can always trial off the med (simply stop it) and see if she manages ok off it (w/ respect to the incontinence) and if she needs to get back on it then can resume it.   
19-Jan-2023 20:46

## 2024-03-04 NOTE — OP THERAPY DAILY TREATMENT
Outpatient Physical Therapy  DAILY TREATMENT     [facility]  [address]  [place] [address]  Phone:  [phone]  Fax:  [phone]    Date: 03/19/2018    Patient: Tania Aguilar  YOB: 1935  MRN: [id number]     Time Calculation  Start time: 0100  Stop time: 0130 Time Calculation (min): 30 minutes     Chief Complaint: No chief complaint on file.    Visit #: 3    SUBJECTIVE:  Feels like I have a little more feeling in my fingers    OBJECTIVE:  Current objective measures:         Exercises/Treatment  Time-based treatments/modalities:  Manual therapy minutes (CPT 39665): 15 minutes  Therapeutic exercise minutes (CPT 81903): 15 minutes   Joint mobs R wrist  DN carpel tunnel  Dynamic balance  Gait training    Pain rating before treatment: 0  Pain rating after treatment: 0    ASSESSMENT:   Response to treatment:    strength increased from 11lbs to 20lbs    PLAN/RECOMMENDATIONS:   Plan for treatment: therapy treatment to continue next visit.  Planned interventions for next visit: continue with current treatment.       

## 2025-07-23 NOTE — PROGRESS NOTES
Addended by: RAN TRAMMELL on: 7/23/2025 11:54 AM     Modules accepted: Orders     Outcome: Left Message to call back to scheduleComprehensive Geriatric Assessment    Please transfer to Patient Outreach Team at [phone] when patient returns call.    Attempt # 1